# Patient Record
Sex: MALE | Race: WHITE | NOT HISPANIC OR LATINO | Employment: OTHER | URBAN - NONMETROPOLITAN AREA
[De-identification: names, ages, dates, MRNs, and addresses within clinical notes are randomized per-mention and may not be internally consistent; named-entity substitution may affect disease eponyms.]

---

## 2017-09-03 ENCOUNTER — APPOINTMENT (EMERGENCY)
Dept: RADIOLOGY | Facility: HOSPITAL | Age: 67
End: 2017-09-03
Payer: MEDICARE

## 2017-09-03 ENCOUNTER — HOSPITAL ENCOUNTER (EMERGENCY)
Facility: HOSPITAL | Age: 67
Discharge: HOME | End: 2017-09-03
Attending: EMERGENCY MEDICINE | Admitting: EMERGENCY MEDICINE
Payer: MEDICARE

## 2017-09-03 VITALS
SYSTOLIC BLOOD PRESSURE: 143 MMHG | DIASTOLIC BLOOD PRESSURE: 84 MMHG | OXYGEN SATURATION: 96 % | WEIGHT: 195 LBS | TEMPERATURE: 97.4 F | RESPIRATION RATE: 18 BRPM | HEART RATE: 80 BPM

## 2017-09-03 DIAGNOSIS — IMO0002 SKIN LACERATION: Primary | ICD-10-CM

## 2017-09-03 PROCEDURE — 99283 EMERGENCY DEPT VISIT LOW MDM: CPT

## 2017-09-03 PROCEDURE — 73130 X-RAY EXAM OF HAND: CPT

## 2017-09-03 RX ORDER — TRAZODONE HYDROCHLORIDE 150 MG/1
75 TABLET ORAL
COMMUNITY
End: 2018-09-18 | Stop reason: SDUPTHER

## 2017-09-03 RX ORDER — LOSARTAN POTASSIUM 100 MG/1
100 TABLET ORAL DAILY
COMMUNITY
End: 2018-09-18 | Stop reason: SDUPTHER

## 2017-09-03 RX ORDER — LEVOTHYROXINE SODIUM 0.07 MG/1
75 TABLET ORAL DAILY
COMMUNITY
End: 2018-09-18 | Stop reason: SDUPTHER

## 2017-09-03 RX ORDER — SIMVASTATIN 20 MG
20 TABLET ORAL
COMMUNITY
End: 2018-09-18 | Stop reason: SDUPTHER

## 2017-09-03 RX ORDER — ASPIRIN 81 MG/1
81 TABLET, CHEWABLE ORAL DAILY
COMMUNITY
End: 2019-07-01 | Stop reason: ALTCHOICE

## 2017-09-13 ENCOUNTER — OFFICE VISIT (OUTPATIENT)
Dept: URGENT CARE | Facility: CLINIC | Age: 67
End: 2017-09-13
Payer: MEDICARE

## 2017-09-13 PROCEDURE — 99211 OFF/OP EST MAY X REQ PHY/QHP: CPT

## 2018-05-30 ENCOUNTER — OFFICE VISIT (OUTPATIENT)
Dept: URGENT CARE | Facility: CLINIC | Age: 68
End: 2018-05-30
Payer: MEDICARE

## 2018-05-30 VITALS
HEIGHT: 68 IN | OXYGEN SATURATION: 98 % | WEIGHT: 206 LBS | HEART RATE: 96 BPM | SYSTOLIC BLOOD PRESSURE: 122 MMHG | DIASTOLIC BLOOD PRESSURE: 84 MMHG | TEMPERATURE: 97.9 F | RESPIRATION RATE: 16 BRPM | BODY MASS INDEX: 31.22 KG/M2

## 2018-05-30 DIAGNOSIS — R05.9 COUGH: Primary | ICD-10-CM

## 2018-05-30 PROBLEM — E78.5 HYPERLIPIDEMIA: Status: ACTIVE | Noted: 2017-09-13

## 2018-05-30 PROBLEM — I10 HYPERTENSION: Status: ACTIVE | Noted: 2017-09-13

## 2018-05-30 PROBLEM — E03.9 HYPOTHYROIDISM: Status: ACTIVE | Noted: 2017-09-13

## 2018-05-30 PROCEDURE — G0463 HOSPITAL OUTPT CLINIC VISIT: HCPCS

## 2018-05-30 PROCEDURE — 99213 OFFICE O/P EST LOW 20 MIN: CPT

## 2018-05-30 RX ORDER — AZITHROMYCIN 250 MG/1
TABLET, FILM COATED ORAL
Qty: 6 TABLET | Refills: 0 | Status: SHIPPED | OUTPATIENT
Start: 2018-05-30 | End: 2018-06-03

## 2018-05-30 RX ORDER — BENZONATATE 100 MG/1
100 CAPSULE ORAL 3 TIMES DAILY PRN
Qty: 20 CAPSULE | Refills: 0 | Status: SHIPPED | OUTPATIENT
Start: 2018-05-30 | End: 2018-09-18 | Stop reason: ALTCHOICE

## 2018-05-30 RX ORDER — LEVOTHYROXINE SODIUM 0.07 MG/1
75 TABLET ORAL
COMMUNITY
End: 2018-05-30

## 2018-05-30 RX ORDER — TRAZODONE HYDROCHLORIDE 150 MG/1
75 TABLET ORAL DAILY
COMMUNITY
End: 2018-05-30

## 2018-05-30 RX ORDER — SIMVASTATIN 20 MG
20 TABLET ORAL
COMMUNITY
End: 2018-05-30

## 2018-05-30 RX ORDER — LOSARTAN POTASSIUM 100 MG/1
100 TABLET ORAL
COMMUNITY
End: 2018-05-30

## 2018-05-30 NOTE — PROGRESS NOTES
NAME: Elvia Kincaid is a 77 y o  male  : 1950    MRN: [id number]      Assessment and Plan   Cough [R05]  1  Cough  azithromycin (ZITHROMAX) 250 mg tablet    benzonatate (TESSALON PERLES) 100 mg capsule       Toya Campuzano was seen today for cold like symptoms  Diagnoses and all orders for this visit:    Cough  -     azithromycin (ZITHROMAX) 250 mg tablet; Take 2 tablets today and only 1 pill daily until finished  -     benzonatate (TESSALON PERLES) 100 mg capsule; Take 1 capsule (100 mg total) by mouth 3 (three) times a day as needed for cough        Patient Instructions   Patient Instructions   Follow up with pcp  Take meds as directed  Use flonase and zyrtec daily as directed  Watchful waiting discussed, dont use antibiotic unsless symptoms worsen  Take meds as directed   Tessalon perles for the cough      Proceed to ER if symptoms worsen  Chief Complaint     Chief Complaint   Patient presents with    Cold Like Symptoms     Pt states cold like symptoms since   He states runny sinuses, sore throat and a cough, especially when laying flat  He is able to cough up some phlegm  Pt states he did take an OTC med  States some pain in his chest when breathing and labored breathing         History of Present Illness     Patient is here today for sore thorat, cough and increase brouchospasm, rib pain from coughing, increased post nasal drip and has some mucus production yellow green in color, symptoms present for the past days  Recently moved to the area, has no PCP at this time and flew in on  from [facility]  He has no pain to the ears at this time but has constant coughing and post nasal drip present  Taking OTC meds for his symptoms for two days, no relief  Review of Systems   Review of Systems   Constitutional: Negative for fatigue and fever  HENT: Positive for postnasal drip, sneezing and sore throat   Negative for congestion, ear discharge, ear pain, facial swelling, rhinorrhea, sinus pain, sinus pressure, trouble swallowing and voice change  Eyes: Negative  Respiratory: Positive for cough  Cardiovascular: Negative  Gastrointestinal: Negative  Endocrine: Negative  Genitourinary: Negative  Musculoskeletal: Negative  Skin: Negative  Current Medications       Current Outpatient Prescriptions:     aspirin 81 mg chewable tablet, Chew 81 mg daily, Disp: , Rfl:     azithromycin (ZITHROMAX) 250 mg tablet, Take 2 tablets today and only 1 pill daily until finished , Disp: 6 tablet, Rfl: 0    benzonatate (TESSALON PERLES) 100 mg capsule, Take 1 capsule (100 mg total) by mouth 3 (three) times a day as needed for cough, Disp: 20 capsule, Rfl: 0    levothyroxine 75 mcg tablet, Take 75 mcg by mouth daily, Disp: , Rfl:     losartan (COZAAR) 100 MG tablet, Take 100 mg by mouth daily, Disp: , Rfl:     simvastatin (ZOCOR) 20 mg tablet, Take 20 mg by mouth daily at bedtime, Disp: , Rfl:     traZODone (DESYREL) 150 mg tablet, Take 50 mg by mouth daily at bedtime, Disp: , Rfl:     Current Allergies     Allergies as of 05/30/2018    (No Known Allergies)              Past Medical History:   Diagnosis Date    Disease of thyroid gland     Hypertension     Sleep disorder        Past Surgical History:   Procedure Laterality Date    CHOLECYSTECTOMY      JOINT REPLACEMENT         No family history on file  Medications have been verified  The following portions of the patient's history were reviewed and updated as appropriate: allergies, current medications, past family history, past medical history, past social history, past surgical history and problem list     Objective   /84   Pulse 96   Temp 97 9 °F (36 6 °C)   Resp 16   Ht 5' 8" (1 727 m)   Wt 93 4 kg (206 lb)   SpO2 98%   BMI 31 32 kg/m²      Physical Exam     Physical Exam   Constitutional: He appears well-developed and well-nourished  HENT:   Head: Normocephalic     Right Ear: Tympanic membrane normal    Left Ear: Tympanic membrane normal    Nose: No mucosal edema  Mouth/Throat: Uvula is midline, oropharynx is clear and moist and mucous membranes are normal    Eyes: Conjunctivae and EOM are normal    Neck: Trachea normal and normal range of motion  Neck supple  Cardiovascular: Normal pulses  Pulmonary/Chest: Effort normal and breath sounds normal    Abdominal: Soft  Normal appearance  There is no tenderness  There is no CVA tenderness  Skin: Skin is warm and intact         Roslyn Comes, CRNP

## 2018-05-30 NOTE — PATIENT INSTRUCTIONS
Follow up with pcp  Take meds as directed  Use flonase and zyrtec daily as directed  Watchful waiting discussed, dont use antibiotic unsless symptoms worsen  Take meds as directed   Tessalon perles for the cough

## 2018-06-11 LAB — HBA1C MFR BLD HPLC: 6.2 %

## 2018-08-25 ENCOUNTER — OFFICE VISIT (OUTPATIENT)
Dept: URGENT CARE | Facility: CLINIC | Age: 68
End: 2018-08-25
Payer: MEDICARE

## 2018-08-25 VITALS
HEIGHT: 68 IN | DIASTOLIC BLOOD PRESSURE: 82 MMHG | SYSTOLIC BLOOD PRESSURE: 146 MMHG | OXYGEN SATURATION: 98 % | HEART RATE: 97 BPM | RESPIRATION RATE: 16 BRPM | BODY MASS INDEX: 31.07 KG/M2 | WEIGHT: 205 LBS | TEMPERATURE: 97 F

## 2018-08-25 DIAGNOSIS — L23.7 POISON IVY: ICD-10-CM

## 2018-08-25 DIAGNOSIS — R21 RASH: Primary | ICD-10-CM

## 2018-08-25 PROCEDURE — 99213 OFFICE O/P EST LOW 20 MIN: CPT | Performed by: NURSE PRACTITIONER

## 2018-08-25 PROCEDURE — G0463 HOSPITAL OUTPT CLINIC VISIT: HCPCS | Performed by: NURSE PRACTITIONER

## 2018-08-25 RX ORDER — PREDNISONE 10 MG/1
TABLET ORAL
Qty: 21 TABLET | Refills: 0 | Status: SHIPPED | OUTPATIENT
Start: 2018-08-25 | End: 2018-09-07 | Stop reason: ALTCHOICE

## 2018-08-25 NOTE — PROGRESS NOTES
NAME: Astrid Augustine is a 77 y o  male  : 1950    MRN: [id number]      Assessment and Plan   Rash [R21]  1  Rash     2  Poison ivy  predniSONE 10 mg tablet       Neville Both was seen today for rash  Diagnoses and all orders for this visit:    Rash    Poison ivy  -     predniSONE 10 mg tablet; Take 6 tablets today and decrease by one tablet daily until gone        Patient Instructions   Patient Instructions   Take medications as directed  Use antihistamine claritin daily or zyrtec, may use benadryl prior to bed   May use calamine lotion   dont use steroid cream to the face   Take all meds as directed  Follow up with pcp   Go to ER if symptoms worsen  Steroids are to be used as directed, decrease by one tablet daily    Change linens, towels wash cloths    Proceed to ER if symptoms worsen  Chief Complaint     Chief Complaint   Patient presents with    Rash     both arms after weeding the [place] bushes  History of Present Illness     Pt here today with a rash on both upper arms  He was recently in garden weeding grass and developed rash  It is itchy and bothersome in nature  He has tried using RX strength hydrocorisone 2 5% cream to the areas with little improvement  Rash on upper and lower extremities and no where else, itchy and bothersome  Review of Systems   Review of Systems   Skin: Positive for rash           Current Medications       Current Outpatient Prescriptions:     aspirin 81 mg chewable tablet, Chew 81 mg daily, Disp: , Rfl:     levothyroxine 75 mcg tablet, Take 75 mcg by mouth daily, Disp: , Rfl:     losartan (COZAAR) 100 MG tablet, Take 100 mg by mouth daily, Disp: , Rfl:     simvastatin (ZOCOR) 20 mg tablet, Take 20 mg by mouth daily at bedtime, Disp: , Rfl:     traZODone (DESYREL) 150 mg tablet, Take 50 mg by mouth daily at bedtime, Disp: , Rfl:     benzonatate (TESSALON PERLES) 100 mg capsule, Take 1 capsule (100 mg total) by mouth 3 (three) times a day as needed for cough (Patient not taking: Reported on 8/25/2018 ), Disp: 20 capsule, Rfl: 0    predniSONE 10 mg tablet, Take 6 tablets today and decrease by one tablet daily until gone, Disp: 21 tablet, Rfl: 0    Current Allergies     Allergies as of 08/25/2018    (No Known Allergies)              Past Medical History:   Diagnosis Date    Disease of thyroid gland     Hypertension     Sleep disorder        Past Surgical History:   Procedure Laterality Date    CHOLECYSTECTOMY      JOINT REPLACEMENT      KNEE SURGERY      LEG WOUND REPAIR / CLOSURE         No family history on file  Medications have been verified  The following portions of the patient's history were reviewed and updated as appropriate: allergies, current medications, past family history, past medical history, past social history, past surgical history and problem list     Objective   /82   Pulse 97   Temp (!) 97 °F (36 1 °C)   Resp 16   Ht 5' 8" (1 727 m)   Wt 93 kg (205 lb)   SpO2 98%   BMI 31 17 kg/m²      Physical Exam     Physical Exam   Constitutional: He appears well-developed and well-nourished  Skin: Rash (rash noted to both upper arms, ) noted              [place] Levo, CRNP

## 2018-08-25 NOTE — PATIENT INSTRUCTIONS
Take medications as directed  Use antihistamine claritin daily or zyrtec, may use benadryl prior to bed   May use calamine lotion   dont use steroid cream to the face   Take all meds as directed  Follow up with pcp   Go to ER if symptoms worsen  Steroids are to be used as directed, decrease by one tablet daily    Change linens, towels wash cloths

## 2018-09-07 ENCOUNTER — HOSPITAL ENCOUNTER (EMERGENCY)
Facility: HOSPITAL | Age: 68
Discharge: HOME | End: 2018-09-07
Attending: EMERGENCY MEDICINE | Admitting: EMERGENCY MEDICINE
Payer: MEDICARE

## 2018-09-07 VITALS
DIASTOLIC BLOOD PRESSURE: 60 MMHG | RESPIRATION RATE: 20 BRPM | TEMPERATURE: 98 F | SYSTOLIC BLOOD PRESSURE: 144 MMHG | WEIGHT: 205.03 LBS | OXYGEN SATURATION: 99 % | HEART RATE: 75 BPM | BODY MASS INDEX: 31.17 KG/M2

## 2018-09-07 DIAGNOSIS — S61.219A FINGER LACERATION: Primary | ICD-10-CM

## 2018-09-07 PROCEDURE — 99282 EMERGENCY DEPT VISIT SF MDM: CPT

## 2018-09-07 RX ORDER — GINSENG 100 MG
1 CAPSULE ORAL ONCE
Status: COMPLETED | OUTPATIENT
Start: 2018-09-07 | End: 2018-09-07

## 2018-09-07 RX ORDER — LIDOCAINE HYDROCHLORIDE 10 MG/ML
5 INJECTION, SOLUTION EPIDURAL; INFILTRATION; INTRACAUDAL; PERINEURAL ONCE
Status: COMPLETED | OUTPATIENT
Start: 2018-09-07 | End: 2018-09-07

## 2018-09-07 RX ADMIN — BACITRACIN ZINC 1 SMALL APPLICATION: 500 OINTMENT TOPICAL at 09:23

## 2018-09-07 RX ADMIN — LIDOCAINE HYDROCHLORIDE 5 ML: 10 INJECTION, SOLUTION EPIDURAL; INFILTRATION; INTRACAUDAL; PERINEURAL at 09:23

## 2018-09-07 NOTE — ED NOTES
Wound cleansed with NSS  Bacitracin ointment and DSD applied  AL Foam splint applied to index finger       Shukri Cook RN  09/07/18 5734

## 2018-09-07 NOTE — ED PROVIDER NOTES
History  Chief Complaint   Patient presents with    Finger Laceration     To ED with laceration to right index finger from a knife approx 20 minutes ago  This is a 68-year-old male who cut his right 2nd finger with a knife 20 minutes prior to arrival tetanus up-to-date gross sensation and movement to the digits is intact        History provided by:  Patient  Injury   Location:   right 2nd finger  Quality:   laceration  Severity:  Moderate  Onset quality:  Sudden  Timing:  Constant  Progression:  Unchanged  Chronicity:  New  Context:   cut right 2nd finger with a knife while cleaning it      Prior to Admission Medications   Prescriptions Last Dose Informant Patient Reported? Taking?   aspirin 81 mg chewable tablet   Yes No   Sig: Chew 81 mg daily   benzonatate (TESSALON PERLES) 100 mg capsule   No No   Sig: Take 1 capsule (100 mg total) by mouth 3 (three) times a day as needed for cough   Patient not taking: Reported on 8/25/2018    levothyroxine 75 mcg tablet   Yes No   Sig: Take 75 mcg by mouth daily   losartan (COZAAR) 100 MG tablet   Yes No   Sig: Take 100 mg by mouth daily   simvastatin (ZOCOR) 20 mg tablet   Yes No   Sig: Take 20 mg by mouth daily at bedtime   traZODone (DESYREL) 150 mg tablet   Yes No   Sig: Take 50 mg by mouth daily at bedtime      Facility-Administered Medications: None       Past Medical History:   Diagnosis Date    Disease of thyroid gland     Hypertension     Sleep disorder        Past Surgical History:   Procedure Laterality Date    CHOLECYSTECTOMY      JOINT REPLACEMENT      KNEE SURGERY      LEG WOUND REPAIR / CLOSURE         History reviewed  No pertinent family history  I have reviewed and agree with the history as documented      Social History   Substance Use Topics    Smoking status: Former Smoker     Types: Cigarettes, Pipe, Cigars    Smokeless tobacco: Never Used    Alcohol use 0 6 oz/week     1 Glasses of wine per week        Review of Systems   Skin: Positive for wound  All other systems reviewed and are negative  Physical Exam  Physical Exam   Constitutional: He is oriented to person, place, and time  He appears well-developed and well-nourished  No distress  HENT:   Head: Normocephalic and atraumatic  Eyes: EOM are normal  Pupils are equal, round, and reactive to light  No scleral icterus  Neck: Neck supple  No tracheal deviation present  Cardiovascular: Normal rate, regular rhythm and intact distal pulses  Pulmonary/Chest: Effort normal and breath sounds normal  No stridor  No respiratory distress  He has no wheezes  Abdominal: Soft  Bowel sounds are normal  He exhibits no distension  There is no tenderness  Musculoskeletal: Normal range of motion  He exhibits tenderness  He exhibits no edema  Neurological: He is oriented to person, place, and time  No cranial nerve deficit  Skin: Skin is warm and dry  He is not diaphoretic  2 cm laceration to the lateral aspect of the right 2nd finger   Psychiatric: He has a normal mood and affect  His behavior is normal  Thought content normal    Nursing note and vitals reviewed        Vital Signs  ED Triage Vitals [09/07/18 0912]   Temperature Pulse Respirations Blood Pressure SpO2   98 °F (36 7 °C) 75 20 144/60 99 %      Temp Source Heart Rate Source Patient Position - Orthostatic VS BP Location FiO2 (%)   Tympanic Monitor Sitting Right arm --      Pain Score       2           Vitals:    09/07/18 0912   BP: 144/60   Pulse: 75   Patient Position - Orthostatic VS: Sitting       Visual Acuity      ED Medications  Medications   lidocaine (PF) (XYLOCAINE-MPF) 1 % injection 5 mL (5 mL Infiltration Given 9/7/18 0923)   bacitracin topical ointment 1 small application (1 small application Topical Given 9/7/18 6096)       Diagnostic Studies  Results Reviewed     None                 No orders to display              Procedures  Lac Repair  Date/Time: 9/7/2018 9:18 AM  Performed by: Valentín Scruggs  Authorized by: Anand RUCKER   Body area: upper extremity  Location details: right index finger  Laceration length: 2 cm  Tendon involvement: none  Nerve involvement: none  Anesthesia: digital block    Anesthesia:  Local Anesthetic: lidocaine 1% without epinephrine  Anesthetic total: 4 mL      Procedure Details:  Irrigation solution: saline  Skin closure: 5-0 nylon  Number of sutures: 5  Technique: simple  Approximation: close  Approximation difficulty: simple  Dressing: antibiotic ointment  Patient tolerance: Patient tolerated the procedure well with no immediate complications             Phone Contacts  ED Phone Contact    ED Course                               MDM  CritCare Time    Disposition  Final diagnoses:   Finger laceration - right 2nd     Time reflects when diagnosis was documented in both MDM as applicable and the Disposition within this note     Time User Action Codes Description Comment    9/7/2018  9:37 AM Regino Counter Add [W16 400B] Finger laceration     9/7/2018  9:37 AM Regino Counter Modify [U81 755K] Finger laceration right 2nd      ED Disposition     ED Disposition Condition Comment    Discharge  Astrid Augustine discharge to home/self care  Condition at discharge: Stable        Follow-up Information     Follow up With Specialties Details Why Contact Info    Yanni Lanier MD Internal Medicine In 1 week For suture removal [facility]  [address]            Patient's Medications   Discharge Prescriptions    No medications on file     No discharge procedures on file      ED Provider  Electronically Signed by           Alan Mejias DO  09/07/18 6715

## 2018-09-07 NOTE — DISCHARGE INSTRUCTIONS
Care For Your Stitches   WHAT YOU NEED TO KNOW:   Stitches, or sutures, are used to close cuts and wounds on the skin  Stitches need to be removed after your wound has healed  DISCHARGE INSTRUCTIONS:   Return to the emergency department if:   · Your stitches come apart  · Blood soaks through your bandages  · You suddenly cannot move your injured joint  · You have sudden numbness around your wound  · You see red streaks coming from your wound  Contact your healthcare provider if:   · You have a fever and chills  · Your wound is red, warm, swollen, or leaking pus  · There is a bad smell coming from your wound  · You have increased pain in the wound area  · You have questions or concerns about your condition or care  Care for your stitches:  Keep your stitches clean and dry  You may need to cover your stitches with a bandage for 24 to 48 hours, or as directed  Do not bump or hit the suture area, as this could open the wound  Do not trim or shorten the ends of your stitches  If they rub on your clothing, put a gauze bandage between the stitches and your clothes  Clean your wound:  Carefully wash your wound with soap and water  For mouth and lip wounds, rinse your mouth after meals and at bedtime  Ask your healthcare provider what to use to rinse your mouth  If you have a scalp wound, you may gently wash your hair every 2 days with mild shampoo  Do not use hair products, such as hair spray  Help your wound heal:   · Elevate  your wound above the level of your heart as often as you can  This will help decrease swelling and pain  Prop your wound on pillows or blankets to keep it elevated comfortably  · Limit activity  Do not stretch the skin around your wound  This will help prevent bleeding and swelling of the wound area  Follow up with your healthcare provider as directed: You may need to return to have your stitches removed   Write down your questions so you remember to ask them during your visits  © 2017 [address] [employer] Information is for End User's use only and may not be sold, redistributed or otherwise used for commercial purposes  All illustrations and images included in CareNotes® are the copyrighted property of A D A M , Inc  or [employer]  The above information is an  only  It is not intended as medical advice for individual conditions or treatments  Talk to your doctor, nurse or pharmacist before following any medical regimen to see if it is safe and effective for you

## 2018-09-18 ENCOUNTER — OFFICE VISIT (OUTPATIENT)
Dept: FAMILY MEDICINE CLINIC | Facility: HOSPITAL | Age: 68
End: 2018-09-18
Payer: MEDICARE

## 2018-09-18 VITALS
SYSTOLIC BLOOD PRESSURE: 130 MMHG | BODY MASS INDEX: 30.99 KG/M2 | WEIGHT: 204.5 LBS | HEART RATE: 78 BPM | DIASTOLIC BLOOD PRESSURE: 70 MMHG | OXYGEN SATURATION: 95 % | HEIGHT: 68 IN

## 2018-09-18 DIAGNOSIS — Z12.11 SCREENING FOR COLON CANCER: ICD-10-CM

## 2018-09-18 DIAGNOSIS — R73.01 IMPAIRED FASTING GLUCOSE: ICD-10-CM

## 2018-09-18 DIAGNOSIS — E78.00 PURE HYPERCHOLESTEROLEMIA: ICD-10-CM

## 2018-09-18 DIAGNOSIS — E03.9 ACQUIRED HYPOTHYROIDISM: ICD-10-CM

## 2018-09-18 DIAGNOSIS — I10 ESSENTIAL HYPERTENSION: ICD-10-CM

## 2018-09-18 DIAGNOSIS — Z23 NEED FOR INFLUENZA VACCINATION: ICD-10-CM

## 2018-09-18 DIAGNOSIS — S61.210A LACERATION OF RIGHT INDEX FINGER WITHOUT FOREIGN BODY WITHOUT DAMAGE TO NAIL, INITIAL ENCOUNTER: Primary | ICD-10-CM

## 2018-09-18 DIAGNOSIS — Z82.49 FH: AORTIC ANEURYSM: ICD-10-CM

## 2018-09-18 PROBLEM — T81.89XA POSTOPERATIVE DEEP VEIN THROMBOSIS (DVT) (HCC): Status: ACTIVE | Noted: 2018-09-18

## 2018-09-18 PROBLEM — T81.89XA POSTOPERATIVE DEEP VEIN THROMBOSIS (DVT) (HCC): Status: RESOLVED | Noted: 2018-09-18 | Resolved: 2018-09-18

## 2018-09-18 PROBLEM — I82.409 POSTOPERATIVE DEEP VEIN THROMBOSIS (DVT) (HCC): Status: ACTIVE | Noted: 2018-09-18

## 2018-09-18 PROBLEM — I82.409 POSTOPERATIVE DEEP VEIN THROMBOSIS (DVT) (HCC): Status: RESOLVED | Noted: 2018-09-18 | Resolved: 2018-09-18

## 2018-09-18 PROCEDURE — G0008 ADMIN INFLUENZA VIRUS VAC: HCPCS

## 2018-09-18 PROCEDURE — 90662 IIV NO PRSV INCREASED AG IM: CPT

## 2018-09-18 PROCEDURE — 99204 OFFICE O/P NEW MOD 45 MIN: CPT | Performed by: INTERNAL MEDICINE

## 2018-09-18 RX ORDER — SIMVASTATIN 20 MG
20 TABLET ORAL
Qty: 90 TABLET | Refills: 3 | Status: SHIPPED | OUTPATIENT
Start: 2018-09-18 | End: 2019-09-30 | Stop reason: SDUPTHER

## 2018-09-18 RX ORDER — LEVOTHYROXINE SODIUM 0.07 MG/1
75 TABLET ORAL DAILY
Qty: 90 TABLET | Refills: 3 | Status: SHIPPED | OUTPATIENT
Start: 2018-09-18 | End: 2019-07-01

## 2018-09-18 RX ORDER — TRAZODONE HYDROCHLORIDE 150 MG/1
75 TABLET ORAL
Qty: 45 TABLET | Refills: 3 | Status: SHIPPED | OUTPATIENT
Start: 2018-09-18 | End: 2019-09-30 | Stop reason: SDUPTHER

## 2018-09-18 RX ORDER — LOSARTAN POTASSIUM 100 MG/1
100 TABLET ORAL DAILY
Qty: 90 TABLET | Refills: 3 | Status: SHIPPED | OUTPATIENT
Start: 2018-09-18 | End: 2019-09-30 | Stop reason: SDUPTHER

## 2018-09-18 NOTE — PROGRESS NOTES
Assessment/Plan:       Diagnoses and all orders for this visit:    Laceration of right index finger without foreign body without damage to nail, initial encounter    FH: aortic aneurysm  -     US abdominal aorta screening aaa; Future    Essential hypertension  -     losartan (COZAAR) 100 MG tablet; Take 1 tablet (100 mg total) by mouth daily    Acquired hypothyroidism  -     levothyroxine 75 mcg tablet; Take 1 tablet (75 mcg total) by mouth daily  -     simvastatin (ZOCOR) 20 mg tablet; Take 1 tablet (20 mg total) by mouth daily at bedtime  -     traZODone (DESYREL) 150 mg tablet; Take 0 5 tablets (75 mg total) by mouth daily at bedtime    Pure hypercholesterolemia    Impaired fasting glucose  -     Hemoglobin A1C; Future    Screening for colon cancer  -     Ambulatory referral to Gastroenterology; Future          All of the above diagnoses have been assessed  Additional COMMENTS/PLAN: never had AAA US in spite of FH father and brothers  Will see back in Dec and then every 6 months  Glyco before -full BW in June  Subjective:      Patient ID: Vineet Kenman is a 77 y o  male  HPI     Laceration-Has on R hand  The following portions of the patient's history were revised and updated as appropriate: Problem list, allergies, med list, FH, SH, Past medical and surgical histories  Current Outpatient Prescriptions   Medication Sig Dispense Refill    aspirin 81 mg chewable tablet Chew 81 mg daily      levothyroxine 75 mcg tablet Take 1 tablet (75 mcg total) by mouth daily 90 tablet 3    losartan (COZAAR) 100 MG tablet Take 1 tablet (100 mg total) by mouth daily 90 tablet 3    simvastatin (ZOCOR) 20 mg tablet Take 1 tablet (20 mg total) by mouth daily at bedtime 90 tablet 3    traZODone (DESYREL) 150 mg tablet Take 0 5 tablets (75 mg total) by mouth daily at bedtime 45 tablet 3     No current facility-administered medications for this visit  Review of Systems   Constitutional: Negative  Respiratory: Negative  Cardiovascular: Negative  Gastrointestinal: Negative  Genitourinary: Negative  Musculoskeletal:        Is troubled by base of the thumb arthritis  Objective:    /70 (BP Location: Left arm, Patient Position: Sitting, Cuff Size: Standard)   Pulse 78   Ht 5' 8" (1 727 m)   Wt 92 8 kg (204 lb 8 oz)   SpO2 95%   BMI 31 09 kg/m²      Physical Exam   Constitutional: He appears well-developed and well-nourished  Neck: No JVD present  Cardiovascular: Normal rate  Pulmonary/Chest: Effort normal and breath sounds normal    Abdominal: Soft  Bowel sounds are normal    Musculoskeletal: He exhibits no edema  Skin:   4 simple sutures removed from the R index finger  Full strength and ROM   Vitals reviewed  No visits with results within 2 Week(s) from this visit     Latest known visit with results is:   Orders Only on 08/09/2018   Component Date Value Ref Range Status    Hemoglobin A1C 06/11/2018 6 2   Final         Beatrice Verdugo MD

## 2018-10-01 ENCOUNTER — HOSPITAL ENCOUNTER (OUTPATIENT)
Dept: ULTRASOUND IMAGING | Facility: HOSPITAL | Age: 68
Discharge: HOME | End: 2018-10-01
Attending: INTERNAL MEDICINE
Payer: MEDICARE

## 2018-10-01 DIAGNOSIS — Z82.49 FH: AORTIC ANEURYSM: ICD-10-CM

## 2018-10-01 PROCEDURE — 76706 US ABDL AORTA SCREEN AAA: CPT

## 2018-12-19 ENCOUNTER — APPOINTMENT (OUTPATIENT)
Dept: LAB | Facility: HOSPITAL | Age: 68
End: 2018-12-19
Attending: INTERNAL MEDICINE
Payer: MEDICARE

## 2018-12-19 DIAGNOSIS — R73.01 IMPAIRED FASTING GLUCOSE: ICD-10-CM

## 2018-12-19 LAB
EST. AVERAGE GLUCOSE BLD GHB EST-MCNC: 137 MG/DL
HBA1C MFR BLD: 6.4 % (ref 4.2–6.3)

## 2018-12-19 PROCEDURE — 36415 COLL VENOUS BLD VENIPUNCTURE: CPT

## 2018-12-19 PROCEDURE — 83036 HEMOGLOBIN GLYCOSYLATED A1C: CPT

## 2018-12-27 ENCOUNTER — OFFICE VISIT (OUTPATIENT)
Dept: FAMILY MEDICINE CLINIC | Facility: HOSPITAL | Age: 68
End: 2018-12-27
Payer: MEDICARE

## 2018-12-27 VITALS
DIASTOLIC BLOOD PRESSURE: 70 MMHG | SYSTOLIC BLOOD PRESSURE: 120 MMHG | HEART RATE: 79 BPM | BODY MASS INDEX: 30.92 KG/M2 | HEIGHT: 68 IN | WEIGHT: 204 LBS

## 2018-12-27 DIAGNOSIS — Z12.5 ENCOUNTER FOR PROSTATE CANCER SCREENING: ICD-10-CM

## 2018-12-27 DIAGNOSIS — E78.00 PURE HYPERCHOLESTEROLEMIA: ICD-10-CM

## 2018-12-27 DIAGNOSIS — I10 ESSENTIAL HYPERTENSION: ICD-10-CM

## 2018-12-27 DIAGNOSIS — M19.90 ARTHRITIS: ICD-10-CM

## 2018-12-27 DIAGNOSIS — R73.01 IMPAIRED FASTING GLUCOSE: ICD-10-CM

## 2018-12-27 DIAGNOSIS — E03.9 ACQUIRED HYPOTHYROIDISM: Primary | ICD-10-CM

## 2018-12-27 DIAGNOSIS — Z11.59 SCREENING FOR VIRAL DISEASE: ICD-10-CM

## 2018-12-27 PROCEDURE — 99214 OFFICE O/P EST MOD 30 MIN: CPT | Performed by: INTERNAL MEDICINE

## 2018-12-27 NOTE — PROGRESS NOTES
Assessment/Plan:       Diagnoses and all orders for this visit:    Acquired hypothyroidism  -     TSH, 3rd generation; Future    Essential hypertension  -     CBC; Future  -     Comprehensive metabolic panel; Future    Pure hypercholesterolemia  -     Lipid Panel with Direct LDL reflex; Future    Impaired fasting glucose  -     Hemoglobin A1C; Future    Screening for viral disease  -     Hepatitis C antibody; Future    Encounter for prostate cancer screening  -     PSA, Total Screen; Future    Arthritis  -     Cyclic citrul peptide antibody, IgG; Future  -     Sedimentation rate, automated; Future          All of the above diagnoses have been assessed  Additional COMMENTS/PLAN:  wellness next OV    Will check for RA next OV      Subjective:      Patient ID: Julio Ni is a 77 y o  male  HPI     Hypertension  Patient is here for follow-up of elevated blood pressure  He is  adherent to a low-salt diet  Patient denies headache, dizziness or lightheadedness Cardiovascular risk factors: dyslipidemia  History of target organ damage: none  IFG-has borderline glyco  Will be more active in [place]  Hypothyroid - Patient denies any symptoms such as heat or cold intolerance, change in hair texture, or change in bowel habits  There has been no significant change in weight  The following portions of the patient's history were revised and updated as appropriate: Problem list, allergies, med list, FH, SH, Past medical and surgical histories      Current Outpatient Prescriptions   Medication Sig Dispense Refill    aspirin 81 mg chewable tablet Chew 81 mg daily      levothyroxine 75 mcg tablet Take 1 tablet (75 mcg total) by mouth daily 90 tablet 3    losartan (COZAAR) 100 MG tablet Take 1 tablet (100 mg total) by mouth daily 90 tablet 3    simvastatin (ZOCOR) 20 mg tablet Take 1 tablet (20 mg total) by mouth daily at bedtime 90 tablet 3    traZODone (DESYREL) 150 mg tablet Take 0 5 tablets (75 mg total) by mouth daily at bedtime 45 tablet 3     No current facility-administered medications for this visit  Review of Systems   Musculoskeletal: Positive for arthralgias  All other systems reviewed and are negative  Objective:    /70 (BP Location: Left arm, Patient Position: Sitting, Cuff Size: Large)   Pulse 79   Ht 5' 8" (1 727 m)   Wt 92 5 kg (204 lb)   BMI 31 02 kg/m²      Physical Exam   Constitutional: He appears well-developed and well-nourished  HENT:   Head: Normocephalic and atraumatic  Neck: No JVD present  No thyromegaly present  Cardiovascular: Normal rate, regular rhythm and normal heart sounds  Pulmonary/Chest: Effort normal and breath sounds normal    Abdominal: Soft  Bowel sounds are normal  There is no tenderness  Musculoskeletal: Normal range of motion  He exhibits no edema  Skin: Skin is warm and dry  Vitals reviewed          Appointment on 12/19/2018   Component Date Value Ref Range Status    Hemoglobin A1C 12/19/2018 6 4* 4 2 - 6 3 % Final    EAG 12/19/2018 137  mg/dl Final         Dorita Clay MD

## 2018-12-28 ENCOUNTER — TELEPHONE (OUTPATIENT)
Dept: FAMILY MEDICINE CLINIC | Facility: HOSPITAL | Age: 68
End: 2018-12-28

## 2018-12-28 DIAGNOSIS — J40 BRONCHITIS: Primary | ICD-10-CM

## 2018-12-28 RX ORDER — CEFUROXIME AXETIL 500 MG/1
500 TABLET ORAL EVERY 12 HOURS SCHEDULED
Qty: 14 TABLET | Refills: 0 | Status: SHIPPED | OUTPATIENT
Start: 2018-12-28 | End: 2019-01-04

## 2018-12-28 RX ORDER — PROMETHAZINE HYDROCHLORIDE AND CODEINE PHOSPHATE 6.25; 1 MG/5ML; MG/5ML
5 SYRUP ORAL EVERY 4 HOURS PRN
Qty: 150 ML | Refills: 0 | Status: SHIPPED | OUTPATIENT
Start: 2018-12-28 | End: 2019-07-01 | Stop reason: ALTCHOICE

## 2019-06-25 ENCOUNTER — APPOINTMENT (OUTPATIENT)
Dept: LAB | Facility: HOSPITAL | Age: 69
End: 2019-06-25
Attending: INTERNAL MEDICINE
Payer: MEDICARE

## 2019-06-25 DIAGNOSIS — I10 ESSENTIAL HYPERTENSION: ICD-10-CM

## 2019-06-25 DIAGNOSIS — Z12.5 ENCOUNTER FOR PROSTATE CANCER SCREENING: ICD-10-CM

## 2019-06-25 DIAGNOSIS — R73.01 IMPAIRED FASTING GLUCOSE: ICD-10-CM

## 2019-06-25 DIAGNOSIS — Z11.59 SCREENING FOR VIRAL DISEASE: ICD-10-CM

## 2019-06-25 DIAGNOSIS — E78.00 PURE HYPERCHOLESTEROLEMIA: ICD-10-CM

## 2019-06-25 DIAGNOSIS — E03.9 ACQUIRED HYPOTHYROIDISM: ICD-10-CM

## 2019-06-25 LAB
ALBUMIN SERPL BCP-MCNC: 3.9 G/DL (ref 3.5–5)
ALP SERPL-CCNC: 100 U/L (ref 46–116)
ALT SERPL W P-5'-P-CCNC: 23 U/L (ref 12–78)
ANION GAP SERPL CALCULATED.3IONS-SCNC: 8 MMOL/L (ref 4–13)
AST SERPL W P-5'-P-CCNC: 18 U/L (ref 5–45)
BILIRUB SERPL-MCNC: 0.8 MG/DL (ref 0.2–1)
BUN SERPL-MCNC: 19 MG/DL (ref 5–25)
CALCIUM SERPL-MCNC: 9.1 MG/DL (ref 8.3–10.1)
CHLORIDE SERPL-SCNC: 106 MMOL/L (ref 100–108)
CHOLEST SERPL-MCNC: 176 MG/DL (ref 50–200)
CO2 SERPL-SCNC: 26 MMOL/L (ref 21–32)
CREAT SERPL-MCNC: 0.95 MG/DL (ref 0.6–1.3)
ERYTHROCYTE [DISTWIDTH] IN BLOOD BY AUTOMATED COUNT: 12.7 % (ref 11.6–15.1)
EST. AVERAGE GLUCOSE BLD GHB EST-MCNC: 114 MG/DL
GFR SERPL CREATININE-BSD FRML MDRD: 80 ML/MIN/1.73SQ M
GLUCOSE P FAST SERPL-MCNC: 125 MG/DL (ref 65–99)
HBA1C MFR BLD: 5.6 % (ref 4.2–6.3)
HCT VFR BLD AUTO: 41.4 % (ref 36.5–49.3)
HCV AB SER QL: NORMAL
HDLC SERPL-MCNC: 56 MG/DL (ref 40–60)
HGB BLD-MCNC: 13.8 G/DL (ref 12–17)
LDLC SERPL CALC-MCNC: 100 MG/DL (ref 0–100)
MCH RBC QN AUTO: 31.5 PG (ref 26.8–34.3)
MCHC RBC AUTO-ENTMCNC: 33.3 G/DL (ref 31.4–37.4)
MCV RBC AUTO: 95 FL (ref 82–98)
PLATELET # BLD AUTO: 237 THOUSANDS/UL (ref 149–390)
PMV BLD AUTO: 9.5 FL (ref 8.9–12.7)
POTASSIUM SERPL-SCNC: 4.2 MMOL/L (ref 3.5–5.3)
PROT SERPL-MCNC: 7.4 G/DL (ref 6.4–8.2)
PSA SERPL-MCNC: 2.5 NG/ML (ref 0–4)
RBC # BLD AUTO: 4.38 MILLION/UL (ref 3.88–5.62)
SODIUM SERPL-SCNC: 140 MMOL/L (ref 136–145)
TRIGL SERPL-MCNC: 101 MG/DL
TSH SERPL DL<=0.05 MIU/L-ACNC: 3.79 UIU/ML (ref 0.36–3.74)
WBC # BLD AUTO: 5.53 THOUSAND/UL (ref 4.31–10.16)

## 2019-06-25 PROCEDURE — 36415 COLL VENOUS BLD VENIPUNCTURE: CPT

## 2019-06-25 PROCEDURE — 83036 HEMOGLOBIN GLYCOSYLATED A1C: CPT

## 2019-06-25 PROCEDURE — 80061 LIPID PANEL: CPT

## 2019-06-25 PROCEDURE — 86803 HEPATITIS C AB TEST: CPT

## 2019-06-25 PROCEDURE — 80053 COMPREHEN METABOLIC PANEL: CPT

## 2019-06-25 PROCEDURE — 85027 COMPLETE CBC AUTOMATED: CPT

## 2019-06-25 PROCEDURE — G0103 PSA SCREENING: HCPCS

## 2019-06-25 PROCEDURE — 84443 ASSAY THYROID STIM HORMONE: CPT

## 2019-07-01 ENCOUNTER — APPOINTMENT (OUTPATIENT)
Dept: LAB | Facility: HOSPITAL | Age: 69
End: 2019-07-01
Attending: INTERNAL MEDICINE
Payer: MEDICARE

## 2019-07-01 ENCOUNTER — OFFICE VISIT (OUTPATIENT)
Dept: FAMILY MEDICINE CLINIC | Facility: HOSPITAL | Age: 69
End: 2019-07-01
Payer: MEDICARE

## 2019-07-01 VITALS
HEIGHT: 67 IN | SYSTOLIC BLOOD PRESSURE: 110 MMHG | DIASTOLIC BLOOD PRESSURE: 78 MMHG | BODY MASS INDEX: 28.72 KG/M2 | WEIGHT: 183 LBS | HEART RATE: 90 BPM

## 2019-07-01 DIAGNOSIS — H43.391 VITREOUS FLOATERS OF RIGHT EYE: ICD-10-CM

## 2019-07-01 DIAGNOSIS — I10 ESSENTIAL HYPERTENSION: ICD-10-CM

## 2019-07-01 DIAGNOSIS — R73.01 IFG (IMPAIRED FASTING GLUCOSE): ICD-10-CM

## 2019-07-01 DIAGNOSIS — E78.00 PURE HYPERCHOLESTEROLEMIA: ICD-10-CM

## 2019-07-01 DIAGNOSIS — E03.9 ACQUIRED HYPOTHYROIDISM: Primary | ICD-10-CM

## 2019-07-01 DIAGNOSIS — E03.9 ACQUIRED HYPOTHYROIDISM: ICD-10-CM

## 2019-07-01 DIAGNOSIS — M19.90 ARTHRITIS: ICD-10-CM

## 2019-07-01 LAB
EST. AVERAGE GLUCOSE BLD GHB EST-MCNC: 114 MG/DL
HBA1C MFR BLD: 5.6 % (ref 4.2–6.3)
TSH SERPL DL<=0.05 MIU/L-ACNC: 3.27 UIU/ML (ref 0.36–3.74)

## 2019-07-01 PROCEDURE — 99214 OFFICE O/P EST MOD 30 MIN: CPT | Performed by: INTERNAL MEDICINE

## 2019-07-01 PROCEDURE — 36415 COLL VENOUS BLD VENIPUNCTURE: CPT

## 2019-07-01 PROCEDURE — 83036 HEMOGLOBIN GLYCOSYLATED A1C: CPT

## 2019-07-01 PROCEDURE — 84443 ASSAY THYROID STIM HORMONE: CPT

## 2019-07-01 PROCEDURE — G0438 PPPS, INITIAL VISIT: HCPCS | Performed by: INTERNAL MEDICINE

## 2019-07-01 RX ORDER — LEVOTHYROXINE SODIUM 0.1 MG/1
100 TABLET ORAL DAILY
Qty: 90 TABLET | Refills: 3 | Status: SHIPPED | OUTPATIENT
Start: 2019-07-01 | End: 2019-09-30 | Stop reason: SDUPTHER

## 2019-07-01 NOTE — PROGRESS NOTES
Assessment and Plan:     Problem List Items Addressed This Visit     None         History of Present Illness:     Patient presents for Medicare Annual Wellness visit    Patient Care Team:  Mahad Reyez MD as PCP - General (Internal Medicine)     Problem List:     Patient Active Problem List   Diagnosis    Pure hypercholesterolemia    Essential hypertension    Acquired hypothyroidism    S/p total knee replacement, bilateral    Impaired fasting glucose    Arthritis      Past Medical and Surgical History:     Past Medical History:   Diagnosis Date    Disease of thyroid gland     Hypertension     Sleep disorder      Past Surgical History:   Procedure Laterality Date    CHOLECYSTECTOMY      JOINT REPLACEMENT      KNEE SURGERY      LEG WOUND REPAIR / CLOSURE        Family History:     Family History   Problem Relation Age of Onset    Alcohol abuse Brother     Mental illness Neg Hx       Social History:     Social History     Tobacco Use   Smoking Status Former Smoker    Types: Cigarettes, Pipe, Cigars   Smokeless Tobacco Never Used     Social History     Substance and Sexual Activity   Alcohol Use Yes    Alcohol/week: 1 0 standard drinks    Types: 1 Glasses of wine per week     Social History     Substance and Sexual Activity   Drug Use No      Medications and Allergies:     Current Outpatient Medications   Medication Sig Dispense Refill    aspirin 81 mg chewable tablet Chew 81 mg daily      levothyroxine 75 mcg tablet Take 1 tablet (75 mcg total) by mouth daily 90 tablet 3    losartan (COZAAR) 100 MG tablet Take 1 tablet (100 mg total) by mouth daily 90 tablet 3    promethazine-codeine (PHENERGAN WITH CODEINE) 6 25-10 mg/5 mL syrup Take 5 mL by mouth every 4 (four) hours as needed for cough 150 mL 0    simvastatin (ZOCOR) 20 mg tablet Take 1 tablet (20 mg total) by mouth daily at bedtime 90 tablet 3    traZODone (DESYREL) 150 mg tablet Take 0 5 tablets (75 mg total) by mouth daily at bedtime 45 tablet 3     No current facility-administered medications for this visit  No Known Allergies   Immunizations:     Immunization History   Administered Date(s) Administered    INFLUENZA 10/13/2016, 09/18/2017    Influenza, high dose seasonal 0 5 mL 09/18/2018    Pneumococcal Conjugate 13-Valent 06/19/2015    Pneumococcal Polysaccharide PPV23 10/13/2016    Tdap 04/27/2015      Medicare Screening Tests and Risk Assessments:     Tori Farfan is here for his Subsequent Wellness visit  Health Risk Assessment:  Patient rates overall health as very good  Patient feels that their physical health rating is Same  Eyesight was rated as Slightly worse  Hearing was rated as Same  Patient feels that their emotional and mental health rating is Same  Pain experienced by patient in the last 7 days has been A lot  Patient's pain rating has been 7/10  Patient states that he has experienced no weight loss or gain in last 6 months  (Additional comments: Hand and shoulder pain)    Emotional/Mental Health:  Patient has not been feeling nervous/anxious  PHQ-9 Depression Screening:    Frequency of the following problems over the past two weeks:      1  Little interest or pleasure in doing things: 0 - not at all      2  Feeling down, depressed, or hopeless: 0 - not at all  PHQ-2 Score: 0          Broken Bones/Falls: Fall Risk Assessment:    In the past year, patient has experienced: No history of falling in past year          Bladder/Bowel:  Patient has leaked urine accidently in the last six months  Patient reports no loss of bowel control  Immunizations:  Patient has had a flu vaccination within the last year  Patient has received a pneumonia shot  Patient has not received a shingles shot  Patient has received tetanus/diphtheria shot  Date of tetanus/diphtheria shot: 9/1/2017    Home Safety:  Patient does not have trouble with stairs inside or outside of their home     Patient currently reports that there are no safety hazards present in home, working smoke alarms, working carbon monoxide detectors  Preventative Screenings:   prostate cancer screen performed, 6/25/2019  colon cancer screen completed, 10/9/2018  cholesterol screen completed, 6/25/2019  glaucoma eye exam completed,     Nutrition:  Current diet: Regular, Low Carb and Limited junk food with servings of the following:    Medications:  Patient is not currently taking any over-the-counter supplements  Patient is able to manage medications  Lifestyle Choices:  Patient reports no tobacco use  Patient has smoked or used tobacco in the past   Patient has stopped his tobacco use  Tobacco use quit date: quit in 1983  Patient reports alcohol use  Alcohol use per week: 1-2 glasses of wine per day  Patient drives a vehicle  Patient wears seat belt  Current level of exercise of physical activity described by patient as: walk 2 miles per day and 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  Activities of Daily Living:  Can get out of bed by his or her self, able to dress self, able to make own meals, able to do own shopping, able to bathe self, can do own laundry/housekeeping, can manage own money, pay bills and track expenses    Previous Hospitalizations:  No hospitalization or ED visit in past 12 months        Advanced Directives:  Patient has decided on a power of   Patient has spoken to designated power of   Patient has completed advanced directive          Preventative Screening/Counseling:      Cardiovascular:      General: Screening Current          Diabetes:      General: Screening Current          Colorectal Cancer:      General: Screening Current          Prostate Cancer:      General: Screening Current          Osteoporosis:      General: Screening Not Indicated          Glaucoma:      General: Screening Current          HIV:      General: Screening Not Indicated          Hepatitis C:      General: Screening Current        Advanced Directives:   Patient has living will for healthcare, has durable POA for healthcare, patient has an advanced directive  Immunizations:      Shingrix: Risks & Benefits Discussed            BMI Counseling: There is no height or weight on file to calculate BMI  Discussed the patient's BMI with him  The BMI is above average  BMI counseling and education was provided to the patient  Nutrition recommendations include reducing portion sizes

## 2019-07-01 NOTE — PROGRESS NOTES
Assessment/Plan:       Diagnoses and all orders for this visit:    Acquired hypothyroidism  -     levothyroxine 100 mcg tablet; Take 1 tablet (100 mcg total) by mouth daily  -     TSH, 3rd generation; Future    Pure hypercholesterolemia    Essential hypertension    Vitreous floaters of right eye  -     Ambulatory Referral to Ophthalmology; Future    Arthritis  -     Ambulatory referral to Orthopedic Surgery; Future    IFG (impaired fasting glucose)  -     Hemoglobin A1C; Future          All of the above diagnoses have been assessed  Additional COMMENTS/PLAN: needs RA labs    Will be seen back in 4 months with blood before  Subjective:      Patient ID: Aneesh Montero is a 68 y o  male  HPI     IFG-this  Has resolved with 21 lb weight loss  Hypothyroid - Patient denies any symptoms such as heat or cold intolerance, change in hair texture, or change in bowel habits  There has been no significant change in weight  Hands and shoulders arthritis-uses ibuprofen    The following portions of the patient's history were revised and updated as appropriate: Problem list, allergies, med list, FH, SH, Past medical and surgical histories  Current Outpatient Medications   Medication Sig Dispense Refill    levothyroxine 100 mcg tablet Take 1 tablet (100 mcg total) by mouth daily 90 tablet 3    losartan (COZAAR) 100 MG tablet Take 1 tablet (100 mg total) by mouth daily 90 tablet 3    simvastatin (ZOCOR) 20 mg tablet Take 1 tablet (20 mg total) by mouth daily at bedtime 90 tablet 3    traZODone (DESYREL) 150 mg tablet Take 0 5 tablets (75 mg total) by mouth daily at bedtime 45 tablet 3     No current facility-administered medications for this visit  Review of Systems   Musculoskeletal: Positive for arthralgias (pain in hands and shoulders)  All other systems reviewed and are negative          Objective:    /78 (BP Location: Left arm, Patient Position: Sitting, Cuff Size: Large)   Pulse 90   Ht 5' 7" (1 702 m)   Wt 83 kg (183 lb)   BMI 28 66 kg/m²     BP Readings from Last 3 Encounters:   07/01/19 110/78   12/27/18 120/70   09/18/18 130/70                  Wt Readings from Last 3 Encounters:   07/01/19 83 kg (183 lb)   12/27/18 92 5 kg (204 lb)   09/18/18 92 8 kg (204 lb 8 oz)         Physical Exam   Constitutional: He appears well-developed and well-nourished  HENT:   Head: Normocephalic and atraumatic  Neck: No JVD present  No thyromegaly present  Cardiovascular: Normal rate, regular rhythm and normal heart sounds  Pulmonary/Chest: Effort normal and breath sounds normal    Abdominal: Soft  Bowel sounds are normal  There is no tenderness  Musculoskeletal: Normal range of motion  He exhibits no edema  DJD changes on hands  Skin: Skin is warm and dry  Vitals reviewed  Appointment on 06/25/2019   Component Date Value Ref Range Status    WBC 06/25/2019 5 53  4 31 - 10 16 Thousand/uL Final    RBC 06/25/2019 4 38  3 88 - 5 62 Million/uL Final    Hemoglobin 06/25/2019 13 8  12 0 - 17 0 g/dL Final    Hematocrit 06/25/2019 41 4  36 5 - 49 3 % Final    MCV 06/25/2019 95  82 - 98 fL Final    MCH 06/25/2019 31 5  26 8 - 34 3 pg Final    MCHC 06/25/2019 33 3  31 4 - 37 4 g/dL Final    RDW 06/25/2019 12 7  11 6 - 15 1 % Final    Platelets 10/73/6171 237  149 - 390 Thousands/uL Final    MPV 06/25/2019 9 5  8 9 - 12 7 fL Final    Sodium 06/25/2019 140  136 - 145 mmol/L Final    Potassium 06/25/2019 4 2  3 5 - 5 3 mmol/L Final    Chloride 06/25/2019 106  100 - 108 mmol/L Final    CO2 06/25/2019 26  21 - 32 mmol/L Final    ANION GAP 06/25/2019 8  4 - 13 mmol/L Final    BUN 06/25/2019 19  5 - 25 mg/dL Final    Creatinine 06/25/2019 0 95  0 60 - 1 30 mg/dL Final    Standardized to IDMS reference method    Glucose, Fasting 06/25/2019 125* 65 - 99 mg/dL Final      Specimen collection should occur prior to Sulfasalazine administration due to the potential for falsely depressed results  Specimen collection should occur prior to Sulfapyridine administration due to the potential for falsely elevated results   Calcium 06/25/2019 9 1  8 3 - 10 1 mg/dL Final    AST 06/25/2019 18  5 - 45 U/L Final      Specimen collection should occur prior to Sulfasalazine administration due to the potential for falsely depressed results   ALT 06/25/2019 23  12 - 78 U/L Final      Specimen collection should occur prior to Sulfasalazine administration due to the potential for falsely depressed results   Alkaline Phosphatase 06/25/2019 100  46 - 116 U/L Final    Total Protein 06/25/2019 7 4  6 4 - 8 2 g/dL Final    Albumin 06/25/2019 3 9  3 5 - 5 0 g/dL Final    Total Bilirubin 06/25/2019 0 80  0 20 - 1 00 mg/dL Final    eGFR 06/25/2019 80  ml/min/1 73sq m Final    Hemoglobin A1C 06/25/2019 5 6  4 2 - 6 3 % Final    EAG 06/25/2019 114  mg/dl Final    Cholesterol 06/25/2019 176  50 - 200 mg/dL Final      Cholesterol:       Desirable         <200 mg/dl       Borderline         200-239 mg/dl       High              >239           Triglycerides 06/25/2019 101  <=150 mg/dL Final      Triglyceride:     Normal          <150 mg/dl     Borderline High 150-199 mg/dl     High            200-499 mg/dl        Very High       >499 mg/dl    Specimen collection should occur prior to N-Acetylcysteine or Metamizole administration due to the potential for falsely depressed results   HDL, Direct 06/25/2019 56  40 - 60 mg/dL Final      HDL Cholesterol:       High    >60 mg/dL       Low     <41 mg/dL  Specimen collection should occur prior to Metamizole administration due to the potential for falsley depressed results      LDL Calculated 06/25/2019 100  0 - 100 mg/dL Final      LDL Cholesterol:     Optimal           <100 mg/dl     Near Optimal      100-129 mg/dl     Above Optimal       Borderline High 130-159 mg/dl       High            160-189 mg/dl       Very High       >189 mg/dl         This screening LDL is a calculated result  It does not have the accuracy of the Direct Measured LDL in the monitoring of patients with hyperlipidemia and/or statin therapy  Direct Measure LDL (PSW016) must be ordered separately in these patients   PSA 06/25/2019 2 5  0 0 - 4 0 ng/mL Final      American Urological Association Guidelines define biochemical recurrence of prostate cancer as a detectable or rising PSA value post-radical prostatectomy that is greater than or equal to 0 2 ng/mL with a second confirmatory level of greater than or equal to 0 2 ng/mL   [facility] 3 792* 0 358 - 3 740 uIU/mL Final      Using supplements with high doses of biotin 20 to more than 300 times greater than the adequate daily intake for adults of 30 mcg/day as established by the [place] [employer], can cause falsely depress results      Hepatitis C Ab 06/25/2019 Non-reactive  Non-reactive Final         Génesis Howard MD

## 2019-07-02 ENCOUNTER — TELEPHONE (OUTPATIENT)
Dept: FAMILY MEDICINE CLINIC | Facility: HOSPITAL | Age: 69
End: 2019-07-02

## 2019-07-02 NOTE — TELEPHONE ENCOUNTER
Patient was to have labs drawn in December 2018 to rule out rheumatoid arthritis  Those labs were never drawn  Patient went to lab on 7/1/19 to have these drawn but the lab ben the A1C and TSH instead that were supposed to be done 4 months from now  Patient will call me in October when he is due for labs again

## 2019-07-08 ENCOUNTER — APPOINTMENT (OUTPATIENT)
Dept: RADIOLOGY | Facility: CLINIC | Age: 69
End: 2019-07-08
Payer: MEDICARE

## 2019-07-08 ENCOUNTER — CONSULT (OUTPATIENT)
Dept: OBGYN CLINIC | Facility: CLINIC | Age: 69
End: 2019-07-08
Payer: MEDICARE

## 2019-07-08 VITALS
WEIGHT: 187 LBS | DIASTOLIC BLOOD PRESSURE: 74 MMHG | BODY MASS INDEX: 28.34 KG/M2 | HEIGHT: 68 IN | SYSTOLIC BLOOD PRESSURE: 122 MMHG

## 2019-07-08 DIAGNOSIS — R52 PAIN: ICD-10-CM

## 2019-07-08 DIAGNOSIS — M18.11 ARTHRITIS OF CARPOMETACARPAL (CMC) JOINT OF RIGHT THUMB: ICD-10-CM

## 2019-07-08 DIAGNOSIS — M18.12 ARTHRITIS OF CARPOMETACARPAL (CMC) JOINT OF LEFT THUMB: Primary | ICD-10-CM

## 2019-07-08 PROCEDURE — 73140 X-RAY EXAM OF FINGER(S): CPT

## 2019-07-08 PROCEDURE — 99203 OFFICE O/P NEW LOW 30 MIN: CPT | Performed by: ORTHOPAEDIC SURGERY

## 2019-07-08 PROCEDURE — 20600 DRAIN/INJ JOINT/BURSA W/O US: CPT | Performed by: ORTHOPAEDIC SURGERY

## 2019-07-08 RX ORDER — BETAMETHASONE SODIUM PHOSPHATE AND BETAMETHASONE ACETATE 3; 3 MG/ML; MG/ML
3 INJECTION, SUSPENSION INTRA-ARTICULAR; INTRALESIONAL; INTRAMUSCULAR; SOFT TISSUE
Status: COMPLETED | OUTPATIENT
Start: 2019-07-08 | End: 2019-07-08

## 2019-07-08 RX ORDER — LIDOCAINE HYDROCHLORIDE 10 MG/ML
1 INJECTION, SOLUTION INFILTRATION; PERINEURAL
Status: COMPLETED | OUTPATIENT
Start: 2019-07-08 | End: 2019-07-08

## 2019-07-08 RX ADMIN — BETAMETHASONE SODIUM PHOSPHATE AND BETAMETHASONE ACETATE 3 MG: 3; 3 INJECTION, SUSPENSION INTRA-ARTICULAR; INTRALESIONAL; INTRAMUSCULAR; SOFT TISSUE at 11:10

## 2019-07-08 RX ADMIN — LIDOCAINE HYDROCHLORIDE 1 ML: 10 INJECTION, SOLUTION INFILTRATION; PERINEURAL at 11:10

## 2019-07-08 NOTE — PROGRESS NOTES
ASSESSMENT/PLAN:    Assessment:   Thumb CMC Arthritis  bilateral    Plan:   The patient is going to continue with NSAIDs prn as well as pain relief creams  He will also proceed with steroid injections into his bilateral thumb cmc joints today  He was also given bilateral comfort cool braces to use for activities  Follow Up:  3  month(s)    To Do Next Visit:       General Discussions:     CMC Arthritis: The anatomy and physiology of carpometacarpal joint arthritis was discussed with the patient today in the office  Deterioration of the articular cartilage eventually leads to hypermobility at the thumb Aia 16 joint, resulting in joint subluxation, osteophyte formation, cystic changes within the trapezium and base of the first metacarpal, as well as subchondral sclerosis  Eventually, pain, limited mobility, and compensatory hyperextension at the metacarpophalangeal joint may develop  While normal activity and usage of the thumb joint may provide a painful experience to the patient, this typically does not result in damage to the thumb or hand  Treatment options include resting thumb spica splints to decreased joint edema, pain, and inflammation  Therapy exercises to strengthen the thenar musculature may relieve pain, but do not alter the overall continued development of osteoarthritis  Oral medications, topical medications, corticosteroid injections may decrease pain and increase overall function  Eventually, approximately 5% of patients may require surgical intervention  Operative Discussions:         _____________________________________________________  CHIEF COMPLAINT:  Chief Complaint   Patient presents with    Right Thumb - Pain, Numbness    Left Thumb - Pain, Numbness         SUBJECTIVE:  Leandro Christiansen is a 68 y o  male who presents with Pain  Moderate  Intermittant  Sharp to the bilateral thumb  This started  5 year(s) ago as Chronic    Bump them waking up froms leep thumb lrom, picking up small items, stabbing pain, previous ctr   Radiation: Yes to the  wrist and thumb  Previous Treatments: NSAIDs and pain relief creams with only partial relief  Associated symptoms: No Complaints    PAST MEDICAL HISTORY:  Past Medical History:   Diagnosis Date    Disease of thyroid gland     Hypertension     Sleep disorder        PAST SURGICAL HISTORY:  Past Surgical History:   Procedure Laterality Date    CHOLECYSTECTOMY      JOINT REPLACEMENT      KNEE SURGERY      LEG WOUND REPAIR / CLOSURE         FAMILY HISTORY:  Family History   Problem Relation Age of Onset    Alcohol abuse Brother     Mental illness Neg Hx        SOCIAL HISTORY:  Social History     Tobacco Use    Smoking status: Former Smoker     Types: Cigarettes, Pipe, Cigars    Smokeless tobacco: Never Used    Tobacco comment: quit in 1983   Substance Use Topics    Alcohol use: Yes     Alcohol/week: 1 0 standard drinks     Types: 1 Glasses of wine per week     Frequency: 4 or more times a week     Drinks per session: 1 or 2     Binge frequency: Never    Drug use: No       MEDICATIONS:    Current Outpatient Medications:     levothyroxine 100 mcg tablet, Take 1 tablet (100 mcg total) by mouth daily, Disp: 90 tablet, Rfl: 3    losartan (COZAAR) 100 MG tablet, Take 1 tablet (100 mg total) by mouth daily, Disp: 90 tablet, Rfl: 3    simvastatin (ZOCOR) 20 mg tablet, Take 1 tablet (20 mg total) by mouth daily at bedtime, Disp: 90 tablet, Rfl: 3    traZODone (DESYREL) 150 mg tablet, Take 0 5 tablets (75 mg total) by mouth daily at bedtime, Disp: 45 tablet, Rfl: 3    ALLERGIES:  No Known Allergies    REVIEW OF SYSTEMS:  Pertinent items are noted in HPI      LABS:  HgA1c:   Lab Results   Component Value Date    HGBA1C 5 6 07/01/2019     BMP:   Lab Results   Component Value Date    CALCIUM 9 1 06/25/2019    K 4 2 06/25/2019    CO2 26 06/25/2019     06/25/2019    BUN 19 06/25/2019    CREATININE 0 95 06/25/2019 _____________________________________________________  PHYSICAL EXAMINATION:  Vital signs: /74   Ht 5' 8" (1 727 m)   Wt 84 8 kg (187 lb)   BMI 28 43 kg/m²   General: well developed and well nourished, alert, oriented times 3 and appears comfortable  Psychiatric: Normal  HEENT: Trachea Midline, No torticollis  Cardiovascular: No discernable arrhythmia  Pulmonary: No wheezing or stridor  Skin: No masses, erthema, lacerations, fluctation, ulcerations  Neurovascular: Sensation Intact to the Median, Ulnar, Radial Nerve, Motor Intact to the Median, Ulnar, Radial Nerve and Pulses Intact    MUSCULOSKELETAL EXAMINATION:  LEFT SIDE:  CMC: Positive grind, Positive tendnerness CMC and Positive Shoulder Sign  RIGHT SIDE:  CMC: Positive grind, Positive tendnerness CMC and Positive Shoulder Sign    _____________________________________________________  STUDIES REVIEWED:  Images were reviewd in PACS: xray of bilateral thumbs on 7/8/19 demonstrates stage 4 thumb cmc arthritis         PROCEDURES PERFORMED:  Small joint arthrocentesis: bilateral thumb CMC  Date/Time: 7/8/2019 11:10 AM  Consent given by: patient  Site marked: site marked  Supporting Documentation  Indications: pain   Procedure Details  Location: thumb - bilateral thumb CMC    Medications (Right): 1 mL lidocaine 1 %; 3 mg betamethasone acetate-betamethasone sodium phosphate 6 (3-3) mg/mLMedications (Left): 1 mL lidocaine 1 %; 3 mg betamethasone acetate-betamethasone sodium phosphate 6 (3-3) mg/mL   Patient tolerance: patient tolerated the procedure well with no immediate complications  Dressing:  Sterile dressing applied            Scribe Attestation    I,:   Israel Ugalde am acting as a scribe while in the presence of the attending physician :        I,:   Buffy Donovan MD personally performed the services described in this documentation    as scribed in my presence :

## 2019-08-17 ENCOUNTER — HOSPITAL ENCOUNTER (EMERGENCY)
Facility: HOSPITAL | Age: 69
Discharge: HOME | End: 2019-08-17
Attending: EMERGENCY MEDICINE
Payer: MEDICARE

## 2019-08-17 ENCOUNTER — APPOINTMENT (EMERGENCY)
Dept: CT IMAGING | Facility: HOSPITAL | Age: 69
End: 2019-08-17
Payer: MEDICARE

## 2019-08-17 VITALS
WEIGHT: 186.95 LBS | BODY MASS INDEX: 28.33 KG/M2 | RESPIRATION RATE: 16 BRPM | SYSTOLIC BLOOD PRESSURE: 142 MMHG | DIASTOLIC BLOOD PRESSURE: 86 MMHG | HEART RATE: 82 BPM | HEIGHT: 68 IN | TEMPERATURE: 98.2 F | OXYGEN SATURATION: 98 %

## 2019-08-17 DIAGNOSIS — M19.90 ARTHRITIS: ICD-10-CM

## 2019-08-17 DIAGNOSIS — M62.838 CERVICAL PARASPINAL MUSCLE SPASM: Primary | ICD-10-CM

## 2019-08-17 PROCEDURE — 99284 EMERGENCY DEPT VISIT MOD MDM: CPT

## 2019-08-17 PROCEDURE — 72125 CT NECK SPINE W/O DYE: CPT

## 2019-08-17 PROCEDURE — 99284 EMERGENCY DEPT VISIT MOD MDM: CPT | Performed by: EMERGENCY MEDICINE

## 2019-08-17 RX ORDER — HYDROCODONE BITARTRATE AND ACETAMINOPHEN 5; 325 MG/1; MG/1
1 TABLET ORAL EVERY 6 HOURS PRN
Qty: 12 TABLET | Refills: 0 | Status: SHIPPED | OUTPATIENT
Start: 2019-08-17 | End: 2019-08-26 | Stop reason: ALTCHOICE

## 2019-08-17 RX ORDER — HYDROCODONE BITARTRATE AND ACETAMINOPHEN 5; 325 MG/1; MG/1
1 TABLET ORAL ONCE
Status: COMPLETED | OUTPATIENT
Start: 2019-08-17 | End: 2019-08-17

## 2019-08-17 RX ORDER — METHOCARBAMOL 500 MG/1
500 TABLET, FILM COATED ORAL 2 TIMES DAILY PRN
Qty: 20 TABLET | Refills: 0 | Status: SHIPPED | OUTPATIENT
Start: 2019-08-17 | End: 2019-08-26 | Stop reason: ALTCHOICE

## 2019-08-17 RX ADMIN — HYDROCODONE BITARTRATE AND ACETAMINOPHEN 1 TABLET: 5; 325 TABLET ORAL at 12:13

## 2019-08-17 NOTE — ED PROVIDER NOTES
History  Chief Complaint   Patient presents with    Neck Stiffness     Patient presents to ED with neck pain for the past three days, pt reports waking up with right sided neck pain that felt a knot in the muscle  pt reprots pain radiating into shoulders and is unable to sleep  This 78-year-old man with history of hypertension, elevated cholesterol, hypothyroidism and arthritis complains of pain and stiffness of his neck  Started 3 or 4 days ago on the right side but has progressed to include the entire posterolateral neck and trapezius muscles bilaterally  The pain/stiffness does not radiate any further than this  He notes no new focal weakness or changes sensation  He denies odynophagia, hoarseness and any change in bowel or bladder control  Symptoms worse when he moves his head and gets up and down from a chair or bed  He has limited range of motion due to the pain  He has never had this before  Prior to Admission Medications   Prescriptions Last Dose Informant Patient Reported? Taking?   levothyroxine 100 mcg tablet   No No   Sig: Take 1 tablet (100 mcg total) by mouth daily   losartan (COZAAR) 100 MG tablet  Self No No   Sig: Take 1 tablet (100 mg total) by mouth daily   simvastatin (ZOCOR) 20 mg tablet  Self No No   Sig: Take 1 tablet (20 mg total) by mouth daily at bedtime   traZODone (DESYREL) 150 mg tablet  Self No No   Sig: Take 0 5 tablets (75 mg total) by mouth daily at bedtime      Facility-Administered Medications: None       Past Medical History:   Diagnosis Date    Disease of thyroid gland     Hypertension     Sleep disorder        Past Surgical History:   Procedure Laterality Date    CHOLECYSTECTOMY      JOINT REPLACEMENT      KNEE SURGERY      LEG WOUND REPAIR / CLOSURE         Family History   Problem Relation Age of Onset    Alcohol abuse Brother     Mental illness Neg Hx      I have reviewed and agree with the history as documented      Social History     Tobacco Use    Smoking status: Former Smoker     Types: Cigarettes, Pipe, Cigars    Smokeless tobacco: Never Used    Tobacco comment: quit in 1983   Substance Use Topics    Alcohol use: Yes     Alcohol/week: 1 0 standard drinks     Types: 1 Glasses of wine per week     Frequency: 4 or more times a week     Drinks per session: 1 or 2     Binge frequency: Never    Drug use: No        Review of Systems   Constitutional: Negative  HENT: Negative  Eyes: Negative  Respiratory: Negative  Cardiovascular: Negative  Gastrointestinal: Negative  Endocrine: Negative  Genitourinary: Negative  Musculoskeletal: Positive for arthralgias, myalgias, neck pain and neck stiffness (  See HPI)  Skin: Negative  Allergic/Immunologic: Negative  Neurological: Negative  Negative for weakness, numbness and headaches  Hematological: Negative  Psychiatric/Behavioral: Negative  All other systems reviewed and are negative  Physical Exam  Physical Exam   Constitutional: He is oriented to person, place, and time  He appears well-developed and well-nourished  No distress  HENT:   Head: Normocephalic and atraumatic  Right Ear: External ear normal    Left Ear: External ear normal    Mouth/Throat: Oropharynx is clear and moist    Eyes: Pupils are equal, round, and reactive to light  Conjunctivae and EOM are normal    Neck: No JVD present  No tracheal deviation present  No thyromegaly present  Tenderness bilateral trapezius muscles  Range of motion is limited secondary to pain  There is no posterior midline deformity or step-off  No warmth, erythema, edema or rash  Cardiovascular: Normal rate, regular rhythm, normal heart sounds and intact distal pulses  No murmur heard  Pulmonary/Chest: Effort normal and breath sounds normal    Abdominal: Soft  Bowel sounds are normal  He exhibits no mass  There is no tenderness  There is no rebound and no guarding     Musculoskeletal: He exhibits tenderness ( trapezius muscle bilaterally)  He exhibits no edema or deformity  Lymphadenopathy:     He has no cervical adenopathy  Neurological: He is alert and oriented to person, place, and time  He has normal reflexes  He displays normal reflexes  No cranial nerve deficit or sensory deficit  He exhibits normal muscle tone  Coordination normal    Skin: Skin is warm and dry  Capillary refill takes less than 2 seconds  No rash noted  He is not diaphoretic  Psychiatric: He has a normal mood and affect  His behavior is normal    Nursing note and vitals reviewed  Vital Signs  ED Triage Vitals [08/17/19 1133]   Temperature Pulse Respirations Blood Pressure SpO2   98 2 °F (36 8 °C) 82 16 142/86 98 %      Temp Source Heart Rate Source Patient Position - Orthostatic VS BP Location FiO2 (%)   Tympanic Monitor Sitting Right arm --      Pain Score       5           Vitals:    08/17/19 1133   BP: 142/86   Pulse: 82   Patient Position - Orthostatic VS: Sitting         Visual Acuity      ED Medications  Medications   HYDROcodone-acetaminophen (NORCO) 5-325 mg per tablet 1 tablet (1 tablet Oral Given 8/17/19 1213)       Diagnostic Studies  Results Reviewed     None                 CT cervical spine without contrast   Final Result by Ashley Tipton MD (08/17 1240)         1  No cervical spine fracture or traumatic malalignment  2   Degenerative changes as described above  No significant central canal stenosis               Workstation performed: [id number]                    Procedures  Procedures       ED Course                               MDM  Number of Diagnoses or Management Options  Arthritis: established and worsening  Cervical paraspinal muscle spasm: new and requires workup     Amount and/or Complexity of Data Reviewed  Tests in the radiology section of CPT®: ordered and reviewed        Disposition  Final diagnoses:   Cervical paraspinal muscle spasm   Arthritis     Time reflects when diagnosis was documented in both MDM as applicable and the Disposition within this note     Time User Action Codes Description Comment    8/17/2019 12:42 PM Richard Langford [J62 [id number]] Cervical paraspinal muscle spasm     8/17/2019 12:50 PM Richard Langford [C24 [id number]] Arthritis       ED Disposition     ED Disposition Condition Date/Time Comment    Discharge Stable Sat Aug 17, 2019 12:42 PM Alec Acuna discharge to home/self care  Follow-up Information     Follow up With Specialties Details Why Contact Info    Gladys Prado MD Internal Medicine Call in 2 days  [address] [address]  [phone]            Discharge Medication List as of 8/17/2019 12:52 PM      START taking these medications    Details   HYDROcodone-acetaminophen (NORCO) 5-325 mg per tablet Take 1 tablet by mouth every 6 (six) hours as needed for pain for up to 10 daysMax Daily Amount: 4 tablets, Starting Sat 8/17/2019, Until Tue 8/27/2019, Normal      methocarbamol (ROBAXIN) 500 mg tablet Take 1 tablet (500 mg total) by mouth 2 (two) times a day as needed for muscle spasms, Starting Sat 8/17/2019, Normal         CONTINUE these medications which have NOT CHANGED    Details   levothyroxine 100 mcg tablet Take 1 tablet (100 mcg total) by mouth daily, Starting Mon 7/1/2019, Normal      losartan (COZAAR) 100 MG tablet Take 1 tablet (100 mg total) by mouth daily, Starting Tue 9/18/2018, Normal      simvastatin (ZOCOR) 20 mg tablet Take 1 tablet (20 mg total) by mouth daily at bedtime, Starting Tue 9/18/2018, Normal      traZODone (DESYREL) 150 mg tablet Take 0 5 tablets (75 mg total) by mouth daily at bedtime, Starting Tue 9/18/2018, Normal           No discharge procedures on file      ED Provider  Electronically Signed by           Brigitte Man DO  08/19/19 5310

## 2019-08-17 NOTE — DISCHARGE INSTRUCTIONS
Continue ibuprofen 400 mg every 8 hours with food  Use the muscle relaxant if needed  You may actually use this up to 3 times a day  Reserve the narcotic for severe pain

## 2019-08-26 ENCOUNTER — OFFICE VISIT (OUTPATIENT)
Dept: FAMILY MEDICINE CLINIC | Facility: HOSPITAL | Age: 69
End: 2019-08-26
Payer: MEDICARE

## 2019-08-26 VITALS
SYSTOLIC BLOOD PRESSURE: 110 MMHG | WEIGHT: 186.8 LBS | BODY MASS INDEX: 28.31 KG/M2 | HEIGHT: 68 IN | HEART RATE: 68 BPM | DIASTOLIC BLOOD PRESSURE: 70 MMHG

## 2019-08-26 DIAGNOSIS — M54.2 CERVICALGIA: Primary | ICD-10-CM

## 2019-08-26 PROCEDURE — 99213 OFFICE O/P EST LOW 20 MIN: CPT | Performed by: INTERNAL MEDICINE

## 2019-08-26 NOTE — PROGRESS NOTES
Assessment/Plan:       Diagnoses and all orders for this visit:    Cervicalgia  -     Ambulatory referral to Physical Therapy; Future          All of the above diagnoses have been assessed  Additional COMMENTS/PLAN: will refer to PT      Subjective:      Patient ID: Tim Ramos is a 68 y o  male  HPI     Neck pain-was in ED 8/17 for still neck  Took some hydrocodone  This is better  Now starting to be active again  No radicular component  The following portions of the patient's history were revised and updated as appropriate: Problem list, allergies, med list, FH, SH, Past medical and surgical histories  Current Outpatient Medications   Medication Sig Dispense Refill    levothyroxine 100 mcg tablet Take 1 tablet (100 mcg total) by mouth daily 90 tablet 3    losartan (COZAAR) 100 MG tablet Take 1 tablet (100 mg total) by mouth daily 90 tablet 3    simvastatin (ZOCOR) 20 mg tablet Take 1 tablet (20 mg total) by mouth daily at bedtime 90 tablet 3    traZODone (DESYREL) 150 mg tablet Take 0 5 tablets (75 mg total) by mouth daily at bedtime 45 tablet 3     No current facility-administered medications for this visit  Review of Systems      Objective:    /70 (BP Location: Left arm, Patient Position: Sitting, Cuff Size: Standard)   Pulse 68   Ht 5' 8" (1 727 m)   Wt 84 7 kg (186 lb 12 8 oz)   BMI 28 40 kg/m²     BP Readings from Last 3 Encounters:   08/26/19 110/70   08/17/19 142/86   07/08/19 122/74                  Wt Readings from Last 3 Encounters:   08/26/19 84 7 kg (186 lb 12 8 oz)   08/17/19 84 8 kg (186 lb 15 2 oz)   07/08/19 84 8 kg (187 lb)         Physical Exam   Constitutional: He appears well-developed and well-nourished  Neck:   Some muscle spasm-dec ROM on R lat rotation  DTRs fine  Vitals reviewed  No visits with results within 2 Week(s) from this visit     Latest known visit with results is:   Appointment on 07/01/2019   Component Date Value Ref Range Status    TSH 3RD GENERATON 07/01/2019 3 268  0 358 - 3 740 uIU/mL Final      Using supplements with high doses of biotin 20 to more than 300 times greater than the adequate daily intake for adults of 30 mcg/day as established by the [place] [facility], can cause falsely depress results      Hemoglobin A1C 07/01/2019 5 6  4 2 - 6 3 % Final    EAG 07/01/2019 114  mg/dl Final         Javon Rosenberg MD

## 2019-08-29 ENCOUNTER — EVALUATION (OUTPATIENT)
Dept: PHYSICAL THERAPY | Facility: CLINIC | Age: 69
End: 2019-08-29
Payer: MEDICARE

## 2019-08-29 DIAGNOSIS — M75.41 IMPINGEMENT SYNDROME OF RIGHT SHOULDER: ICD-10-CM

## 2019-08-29 DIAGNOSIS — M54.2 CERVICALGIA: Primary | ICD-10-CM

## 2019-08-29 PROCEDURE — 97161 PT EVAL LOW COMPLEX 20 MIN: CPT | Performed by: PHYSICAL THERAPIST

## 2019-08-29 PROCEDURE — 97110 THERAPEUTIC EXERCISES: CPT | Performed by: PHYSICAL THERAPIST

## 2019-08-29 NOTE — PROGRESS NOTES
PT Evaluation     Today's date: 2019  Patient name: Norman Denise  : 1950  MRN: [id number]  Referring provider: Navya Cordoba MD  Dx:   Encounter Diagnosis     ICD-10-CM    1  Impingement syndrome of right shoulder M75 41    2  Cervicalgia M54 2 Ambulatory referral to Physical Therapy       Start Time: 6643  Stop Time: 0830  Total time in clinic (min): 45 minutes    Assessment  Assessment details: Norman Denise is a 68 y o  male presenting to outpatient physical therapy at [place] [address] with complaints of cervical pain  He presents with decreased range of motion, decreased strength, limited flexibility, poor postural awareness, decreased tolerance to activity and decreased functional mobility due to cervicalgia as well as signs of R shoulder impingement  He would benefit from skilled PT services in order to address these deficits and reach maximum level of function  Thank you for the referral!  Impairments: abnormal or restricted ROM, activity intolerance, impaired balance, impaired physical strength, lacks appropriate home exercise program, pain with function and poor posture   Barriers to therapy: None  Understanding of Dx/Px/POC: excellent  Goals  ST  Independent with HEP in 2 weeks  2  Increase AROM c-spine to WNL all motions in 3 weeks   3  Good postural awareness in 2 weeks    LT  Achieve FOTO score of 72/100 in 8 weeks   2  Able to lift 30# to do home remodeling without cervical or R UE pain in 8 weeks  3  Strength traps = 5/5 B in 8 weeks  4    No tightness R UT or cervical paraspinals in 8 weeks    Plan  Patient would benefit from: skilled PT  Planned modality interventions: cryotherapy and thermotherapy: hydrocollator packs  Planned therapy interventions: ADL retraining, body mechanics training, flexibility, functional ROM exercises, home exercise program, joint mobilization, manual therapy, neuromuscular re-education, postural training, strengthening, stretching, therapeutic activities and therapeutic exercise  Frequency: 2x week  Duration in weeks: 8  Treatment plan discussed with: patient        Subjective Evaluation    History of Present Illness  Mechanism of injury: Pt reports having cervical pain since a few weeks ago with some stiffness without injury  Last week pain became severe  Went to ER and had a CT that was normal   Has been remodeling his home recently and thinks that may be some of the cause of his pain  Also has some R AC jt pain  Pt has HTN  Retired as an   Not a recurrent problem   Quality of life: good    Pain  Current pain ratin  At best pain rating: 3  At worst pain ratin  Quality: sharp, dull ache and tight  Relieving factors: rest  Aggravating factors: lifting and overhead activity  Progression: improved    Social Support  Lives in: multiple-level home  Lives with: spouse    Employment status: not working  Hand dominance: right      Diagnostic Tests  CT scan: normal  Treatments  No previous or current treatments  Patient Goals  Patient goals for therapy: decreased pain, increased motion, increased strength and return to sport/leisure activities          Objective     Static Posture     Head  Forward  Shoulders  Rounded  Postural Observations  Seated posture: fair  Standing posture: fair  Correction of posture: makes symptoms better        Palpation   Left   No palpable tenderness to the cervical paraspinals and suboccipitals  Right   Hypertonic in the cervical paraspinals and suboccipitals  Tenderness   Cervical Spine   Tenderness in the facet joint (R C3-4)  No tenderness in the spinous process       Neurological Testing     Sensation   Cervical/Thoracic   Left   Intact: light touch    Right   Intact: light touch    Shoulder   Left Shoulder   Intact: light touch    Right Shoulder   Intact: light touch    Reflexes     Right   Biceps (C5/C6): normal (2+)  Brachioradialis (C6): normal (2+)    Active Range of Motion   Cervical/Thoracic Spine       Cervical    Flexion:  WFL  Extension:  WFL  Left lateral flexion:  Restriction level: moderate  Right lateral flexion:  Restriction level minimal  Left rotation:  Restriction level: moderate  Right rotation:  Restriction level: minimal  Left Shoulder   Normal active range of motion    Right Shoulder   Normal active range of motion    Strength/Myotome Testing     Left Shoulder     Planes of Motion   Flexion: 5   Extension: 5   Abduction: 5   External rotation at 0°: 5   Internal rotation at 0°: 5     Isolated Muscles   Middle trapezius: 4     Right Shoulder     Planes of Motion   Flexion: 5   Extension: 5   Abduction: 5   External rotation at 0°: 5   Internal rotation at 0°: 5     Isolated Muscles   Middle trapezius: 4     Tests   Cervical   Negative vertical compression  Left Shoulder   Negative ULTT1  Right Shoulder   Positive Hawkin's  Negative empty can, Neer's and ULTT1  Lumbar   Negative vertical compression  Graphical documentation      Flowsheet Rows      Most Recent Value   PT/OT G-Codes   Current Score  60   Projected Score  72             Daily Treatment Diary     Dx:    1  Impingement syndrome of right shoulder    2   Cervicalgia      POC EXPIRES On:  10/24/19  PRECAUTIONS:  None  CO-MORBIDITES:  HTN  PERSONAL FACTORS:  Pt away 9/9/19 - 9/27/19    Manual              STM R UT NV            STM cervical paraspinals R>L NV            Manual cervical traction NV                                          Exercise Diary  8/29            Retro UBE NV            T-band rows B Blue 20            Doorway pec stretch  15" 5            Seated chin tucks 5" 5            T-band LPD B NV            T-band shoulder ER NV            Prone mid traps L/R              Prone rows L/R                                                                                                                                                                             Modalities

## 2019-08-30 DIAGNOSIS — M19.90 ARTHRITIS: Primary | ICD-10-CM

## 2019-08-30 RX ORDER — METHOCARBAMOL 500 MG/1
TABLET, FILM COATED ORAL
Qty: 30 TABLET | Refills: 0 | Status: SHIPPED | OUTPATIENT
Start: 2019-08-30 | End: 2019-10-15 | Stop reason: ALTCHOICE

## 2019-09-04 ENCOUNTER — OFFICE VISIT (OUTPATIENT)
Dept: PHYSICAL THERAPY | Facility: CLINIC | Age: 69
End: 2019-09-04
Payer: MEDICARE

## 2019-09-04 DIAGNOSIS — M75.41 IMPINGEMENT SYNDROME OF RIGHT SHOULDER: ICD-10-CM

## 2019-09-04 DIAGNOSIS — M54.2 CERVICALGIA: Primary | ICD-10-CM

## 2019-09-04 PROCEDURE — 97110 THERAPEUTIC EXERCISES: CPT

## 2019-09-04 PROCEDURE — 97140 MANUAL THERAPY 1/> REGIONS: CPT

## 2019-09-04 NOTE — PROGRESS NOTES
Daily Note     Today's date: 2019  Patient name: Hugo Herndon  : 1950  MRN: [id number]  Referring provider: Alec Carrillo MD  Dx:   Encounter Diagnosis     ICD-10-CM    1  Cervicalgia M54 2    2  Impingement syndrome of right shoulder M75 41                   Subjective: Pt states feeling good after performing his HEP  Objective: See treatment diary below      Assessment: Pt responded great to manuals with increased cervical ROM  In addition, pt tolerated below TE well without increased pain or discomfort  Pt required minimal vcs to maintain good form  Plan: Continue plan of care    Daily Treatment Diary     Dx:    1  Impingement syndrome of right shoulder    2   Cervicalgia      POC EXPIRES On:  10/24/19  PRECAUTIONS:  None  CO-MORBIDITES:  HTN  PERSONAL FACTORS:  Pt away 19 - 19    Manual              STM R UT NV 4           STM cervical paraspinals R>L NV 4           Manual cervical traction NV 2                        Total Time  10               Exercise Diary             Retro UBE NV 6'           T-band rows B Blue 20 Blue 20           Doorway pec stretch  15" 5 15" 5           Seated chin tucks 5" 5 5" 5           T-band LPD B NV Blue 20           T-band shoulder ER NV Blue 20           Prone mid traps L/R   Ext  5# 20           Prone rows L/R  5# 20                                                                                                                                                                           Modalities

## 2019-09-06 ENCOUNTER — OFFICE VISIT (OUTPATIENT)
Dept: PHYSICAL THERAPY | Facility: CLINIC | Age: 69
End: 2019-09-06
Payer: MEDICARE

## 2019-09-06 DIAGNOSIS — M54.2 CERVICALGIA: Primary | ICD-10-CM

## 2019-09-06 DIAGNOSIS — M75.41 IMPINGEMENT SYNDROME OF RIGHT SHOULDER: ICD-10-CM

## 2019-09-06 PROCEDURE — 97110 THERAPEUTIC EXERCISES: CPT

## 2019-09-06 PROCEDURE — 97140 MANUAL THERAPY 1/> REGIONS: CPT

## 2019-09-30 ENCOUNTER — OFFICE VISIT (OUTPATIENT)
Dept: PHYSICAL THERAPY | Facility: CLINIC | Age: 69
End: 2019-09-30
Payer: MEDICARE

## 2019-09-30 DIAGNOSIS — M75.41 IMPINGEMENT SYNDROME OF RIGHT SHOULDER: ICD-10-CM

## 2019-09-30 DIAGNOSIS — E03.9 ACQUIRED HYPOTHYROIDISM: ICD-10-CM

## 2019-09-30 DIAGNOSIS — M54.2 CERVICALGIA: Primary | ICD-10-CM

## 2019-09-30 DIAGNOSIS — I10 ESSENTIAL HYPERTENSION: ICD-10-CM

## 2019-09-30 PROCEDURE — 97164 PT RE-EVAL EST PLAN CARE: CPT | Performed by: PHYSICAL THERAPIST

## 2019-09-30 PROCEDURE — 97110 THERAPEUTIC EXERCISES: CPT | Performed by: PHYSICAL THERAPIST

## 2019-09-30 RX ORDER — SIMVASTATIN 20 MG
20 TABLET ORAL
Qty: 90 TABLET | Refills: 3 | Status: SHIPPED | OUTPATIENT
Start: 2019-09-30 | End: 2020-09-10 | Stop reason: SDUPTHER

## 2019-09-30 RX ORDER — LEVOTHYROXINE SODIUM 0.1 MG/1
100 TABLET ORAL DAILY
Qty: 90 TABLET | Refills: 3 | Status: SHIPPED | OUTPATIENT
Start: 2019-09-30 | End: 2020-09-10 | Stop reason: SDUPTHER

## 2019-09-30 RX ORDER — TRAZODONE HYDROCHLORIDE 150 MG/1
75 TABLET ORAL
Qty: 45 TABLET | Refills: 3 | Status: SHIPPED | OUTPATIENT
Start: 2019-09-30 | End: 2020-09-10 | Stop reason: SDUPTHER

## 2019-09-30 RX ORDER — LOSARTAN POTASSIUM 100 MG/1
100 TABLET ORAL DAILY
Qty: 90 TABLET | Refills: 3 | Status: SHIPPED | OUTPATIENT
Start: 2019-09-30 | End: 2020-09-10 | Stop reason: SDUPTHER

## 2019-09-30 NOTE — PROGRESS NOTES
PT Discharge    Today's date: 2019  Patient name: Rey Franklin  : 1950  MRN: [id number]  Referring provider: Mira Butt MD  Dx:   Encounter Diagnosis     ICD-10-CM    1  Cervicalgia M54 2    2  Impingement syndrome of right shoulder M75 41                   Assessment  Assessment details: Rey Franklin is a 68 y o  male who presented to outpatient physical therapy at [place] [address] with complaints of cervical pain  He presented with decreased range of motion, decreased strength, limited flexibility, poor postural awareness, decreased tolerance to activity and decreased functional mobility due to cervicalgia as well as signs of R shoulder impingement  He has made excellent progression and has met all goals  He is ready for D/C to HEP at this time  Barriers to therapy: None  Understanding of Dx/Px/POC: excellent  Goals  ST  Independent with HEP in 2 weeks - Met  2  Increase AROM c-spine to WNL all motions in 3 weeks - Met   3  Good postural awareness in 2 weeks - Met    LT  Achieve FOTO score of 72/100 in 8 weeks - Met  2  Able to lift 30# to do home remodeling without cervical or R UE pain in 8 weeks - Met  3  Strength traps = 5/5 B in 8 weeks - Met  4  No tightness R UT or cervical paraspinals in 8 weeks - Met    Plan  Treatment plan discussed with: patient        Subjective Evaluation    History of Present Illness  Mechanism of injury: Pt reports having cervical pain that started a few weeks ago with some stiffness without injury, then became severe  Went to ER and had a CT that was normal   Has been remodeling his home and thinks that may be some of the cause of his pain  Also has some R AC jt pain  Pt has HTN  Retired as an   Feeling great now             Not a recurrent problem   Quality of life: excellent    Pain  Current pain ratin  At best pain ratin  At worst pain ratin  Progression: resolved    Social Support  Lives in: multiple-level home  Lives with: spouse    Employment status: not working  Hand dominance: right      Diagnostic Tests  CT scan: normal  Treatments  Current treatment: physical therapy        Objective     Postural Observations  Seated posture: good  Standing posture: good  Correction of posture: makes symptoms better        Palpation   Left   No palpable tenderness to the cervical paraspinals and suboccipitals  Right   No palpable tenderness to the cervical paraspinals and suboccipitals  Tenderness   Cervical Spine   No tenderness in the facet joint and spinous process  Neurological Testing     Sensation   Cervical/Thoracic   Left   Intact: light touch    Right   Intact: light touch    Shoulder   Left Shoulder   Intact: light touch    Right Shoulder   Intact: light touch    Reflexes     Right   Biceps (C5/C6): normal (2+)  Brachioradialis (C6): normal (2+)    Active Range of Motion   Cervical/Thoracic Spine       Cervical    Flexion:  WFL  Extension:  WFL  Left lateral flexion:  WFL  Right lateral flexion:  WFL  Left rotation:  WFL  Right rotation:  WFL  Left Shoulder   Normal active range of motion    Right Shoulder   Normal active range of motion    Strength/Myotome Testing     Left Shoulder     Planes of Motion   Flexion: 5   Extension: 5   Abduction: 5   External rotation at 0°: 5   Internal rotation at 0°: 5     Isolated Muscles   Middle trapezius: 5     Right Shoulder     Planes of Motion   Flexion: 5   Extension: 5   Abduction: 5   External rotation at 0°: 5   Internal rotation at 0°: 5     Isolated Muscles   Middle trapezius: 5     Tests   Cervical   Negative vertical compression  Left Shoulder   Negative ULTT1  Right Shoulder   Positive Hawkin's  Negative empty can, Neer's and ULTT1  Lumbar   Negative vertical compression  Daily Treatment Diary     Dx:    1  Cervicalgia    2   Impingement syndrome of right shoulder      POC EXPIRES On:  10/24/19  PRECAUTIONS:  None  CO-MORBIDITES: HTN  PERSONAL FACTORS:  Pt away 9/9/19 - 9/27/19     Manual    9/4 9/6                 STM R UT NV 4 5                 STM cervical paraspinals R>L NV 4 5                 Manual cervical traction NV 2 5                                         Total Time   10 15                       Exercise Diary  8/29 9/4 9/6 9/30               Retro UBE NV 6' 6'  L4 5'               T-band rows B Blue 20 Blue 20 Blue 20  Blue 20               Doorway pec stretch  15" 5 15" 5 15"5                  Seated chin tucks 5" 5 5" 5 5" 15                 T-band LPD B NV Blue 20 Blue 20                 T-band shoulder ER NV Blue 20 Blue 20                 Prone mid traps L/R    Ext  5# 20                   Prone rows L/R   5# 20                                                                                                                                                                                                                                                                                                                         Modalities

## 2019-10-07 ENCOUNTER — OFFICE VISIT (OUTPATIENT)
Dept: OBGYN CLINIC | Facility: CLINIC | Age: 69
End: 2019-10-07
Payer: MEDICARE

## 2019-10-07 VITALS
DIASTOLIC BLOOD PRESSURE: 77 MMHG | SYSTOLIC BLOOD PRESSURE: 131 MMHG | WEIGHT: 188 LBS | BODY MASS INDEX: 28.49 KG/M2 | HEART RATE: 71 BPM | HEIGHT: 68 IN

## 2019-10-07 DIAGNOSIS — M18.0 ARTHRITIS OF CARPOMETACARPAL (CMC) JOINT OF BOTH THUMBS: Primary | ICD-10-CM

## 2019-10-07 PROCEDURE — 99213 OFFICE O/P EST LOW 20 MIN: CPT | Performed by: ORTHOPAEDIC SURGERY

## 2019-10-07 PROCEDURE — 20600 DRAIN/INJ JOINT/BURSA W/O US: CPT | Performed by: ORTHOPAEDIC SURGERY

## 2019-10-07 RX ORDER — AMOXICILLIN 500 MG/1
500 CAPSULE ORAL
COMMUNITY

## 2019-10-07 RX ORDER — BETAMETHASONE SODIUM PHOSPHATE AND BETAMETHASONE ACETATE 3; 3 MG/ML; MG/ML
6 INJECTION, SUSPENSION INTRA-ARTICULAR; INTRALESIONAL; INTRAMUSCULAR; SOFT TISSUE
Status: COMPLETED | OUTPATIENT
Start: 2019-10-07 | End: 2019-10-07

## 2019-10-07 RX ORDER — LIDOCAINE HYDROCHLORIDE 10 MG/ML
1 INJECTION, SOLUTION INFILTRATION; PERINEURAL
Status: COMPLETED | OUTPATIENT
Start: 2019-10-07 | End: 2019-10-07

## 2019-10-07 RX ADMIN — LIDOCAINE HYDROCHLORIDE 1 ML: 10 INJECTION, SOLUTION INFILTRATION; PERINEURAL at 10:57

## 2019-10-07 RX ADMIN — LIDOCAINE HYDROCHLORIDE 1 ML: 10 INJECTION, SOLUTION INFILTRATION; PERINEURAL at 10:55

## 2019-10-07 RX ADMIN — BETAMETHASONE SODIUM PHOSPHATE AND BETAMETHASONE ACETATE 6 MG: 3; 3 INJECTION, SUSPENSION INTRA-ARTICULAR; INTRALESIONAL; INTRAMUSCULAR; SOFT TISSUE at 10:57

## 2019-10-07 RX ADMIN — BETAMETHASONE SODIUM PHOSPHATE AND BETAMETHASONE ACETATE 6 MG: 3; 3 INJECTION, SUSPENSION INTRA-ARTICULAR; INTRALESIONAL; INTRAMUSCULAR; SOFT TISSUE at 10:55

## 2019-10-07 NOTE — PROGRESS NOTES
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     Plan:   Resume activities as tolerated, steroid injections, NSAIDs, activity modification and pain relief creams    Follow Up:  PRN as patient is going to [place] for the winter, he will call when he returns and the injection wears off  To Do Next Visit:  Reexamination     General Discussions:     [facility] Arthritis: The anatomy and physiology of carpometacarpal joint arthritis was discussed with the patient today in the office  Deterioration of the articular cartilage eventually leads to hypermobility at the thumb [facility] joint, resulting in joint subluxation, osteophyte formation, cystic changes within the trapezium and base of the first metacarpal, as well as subchondral sclerosis  Eventually, pain, limited mobility, and compensatory hyperextension at the metacarpophalangeal joint may develop  While normal activity and usage of the thumb joint may provide a painful experience to the patient, this typically does not result in damage to the thumb or hand  Treatment options include resting thumb spica splints to decreased joint edema, pain, and inflammation  Therapy exercises to strengthen the thenar musculature may relieve pain, but do not alter the overall continued development of osteoarthritis  Oral medications, topical medications, corticosteroid injections may decrease pain and increase overall function  Eventually, approximately 5% of patients may require surgical intervention  _____________________________________________________  CHIEF COMPLAINT:  Chief Complaint   Patient presents with    Left Thumb - Follow-up    Right Thumb - Follow-up         SUBJECTIVE:  Cihnmay Hewitt is a 68 y o  male who presents for follow up regarding Thumb [facility] Arthritis  bilateral   Since last visit, Chinmay Hewitt has tried Resume activities as tolerated with relief    Patient states that he received roughly 2 months of relief from his injection prior to returning of the pain at the base of the thumbs  Patient is right-hand dominant     Today there is Pain  Mild  Intermittant  Dull to the bases of the bilateral thumbs  Denies any numbness or tingling to the thumbs or adjacent digits  Patient will be amenable to repeat round of corticosteroid injection  No other complaints  Radiation: None  Associated symptoms: None    PAST MEDICAL HISTORY:  Past Medical History:   Diagnosis Date    Disease of thyroid gland     Hypertension     Sleep disorder        PAST SURGICAL HISTORY:  Past Surgical History:   Procedure Laterality Date    CHOLECYSTECTOMY      JOINT REPLACEMENT      KNEE SURGERY      LEG WOUND REPAIR / CLOSURE         FAMILY HISTORY:  Family History   Problem Relation Age of Onset    Alcohol abuse Brother     Mental illness Neg Hx        SOCIAL HISTORY:  Social History     Tobacco Use    Smoking status: Former Smoker     Types: Cigarettes, Pipe, Cigars    Smokeless tobacco: Never Used    Tobacco comment: quit in 1983   Substance Use Topics    Alcohol use:  Yes     Alcohol/week: 1 0 standard drinks     Types: 1 Glasses of wine per week     Frequency: 4 or more times a week     Drinks per session: 1 or 2     Binge frequency: Never    Drug use: No       MEDICATIONS:    Current Outpatient Medications:     amoxicillin (AMOXIL) 500 mg capsule, Take 500 mg by mouth Prior to dental, Disp: , Rfl:     levothyroxine 100 mcg tablet, Take 1 tablet (100 mcg total) by mouth daily, Disp: 90 tablet, Rfl: 3    losartan (COZAAR) 100 MG tablet, Take 1 tablet (100 mg total) by mouth daily, Disp: 90 tablet, Rfl: 3    simvastatin (ZOCOR) 20 mg tablet, Take 1 tablet (20 mg total) by mouth daily at bedtime, Disp: 90 tablet, Rfl: 3    traZODone (DESYREL) 150 mg tablet, Take 0 5 tablets (75 mg total) by mouth daily at bedtime, Disp: 45 tablet, Rfl: 3    methocarbamol (ROBAXIN) 500 mg tablet, Take 1 tab twice daily prn muscle spasms (Patient not taking: Reported on 10/7/2019), Disp: 30 tablet, Rfl: 0    ALLERGIES:  No Known Allergies    REVIEW OF SYSTEMS:  Pertinent items are noted in HPI  A comprehensive review of systems was negative  LABS:  HgA1c:   Lab Results   Component Value Date    HGBA1C 5 6 07/01/2019     BMP:   Lab Results   Component Value Date    CALCIUM 9 1 06/25/2019    K 4 2 06/25/2019    CO2 26 06/25/2019     06/25/2019    BUN 19 06/25/2019    CREATININE 0 95 06/25/2019           _____________________________________________________  PHYSICAL EXAMINATION:  Vital signs: /77   Pulse 71   Ht 5' 8" (1 727 m)   Wt 85 3 kg (188 lb)   BMI 28 59 kg/m²   General: well developed and well nourished, alert, oriented times 3 and appears comfortable  Psychiatric: Normal  HEENT: Trachea Midline, No torticollis  Cardiovascular: No discernable arrhythmia  Pulmonary: No wheezing or stridor  Skin: No masses, erythema, lacerations, fluctation, ulcerations  Neurovascular: Sensation Intact to the Median, Ulnar, Radial Nerve, Motor Intact to the Median, Ulnar, Radial Nerve and Pulses Intact    MUSCULOSKELETAL EXAMINATION:  LEFT SIDE:  CMC: No Instability, Positive grind, Positive tendnerness CMC and Positive Tenderness STT  RIGHT SIDE:  CMC: No Instability, Positive grind, Positive tendnerness CMC and Positive Tenderness STT   Flexion extensor tendons are intact, full range of motion noted  _____________________________________________________  STUDIES REVIEWED:  No Studies to review and Images were reviewd in PACS:  Radiographs of bilateral hands were reviewed today by myself and Dr Layla RICKS reveals degenerative changes of bilateral CMC joints of the thumb indicated by joint space narrowing, sclerosis and osteophyte formation        PROCEDURES PERFORMED:  Small joint arthrocentesis: R thumb CMC  Date/Time: 10/7/2019 10:55 AM  Site marked: site marked  Supporting Documentation  Indications: pain   Procedure Details  Location: thumb - R thumb CMC  Needle size: 25 G  Ultrasound guidance: no  Approach: radial  Medications administered: 1 mL lidocaine 1 %; 6 mg betamethasone acetate-betamethasone sodium phosphate 6 (3-3) mg/mL    Patient tolerance: patient tolerated the procedure well with no immediate complications  Dressing:  Sterile dressing applied    Small joint arthrocentesis: L thumb CMC  Date/Time: 10/7/2019 10:57 AM  Site marked: site marked  Procedure Details  Location: thumb - L thumb CMC  Needle size: 25 G  Ultrasound guidance: no  Approach: radial  Medications administered: 1 mL lidocaine 1 %; 6 mg betamethasone acetate-betamethasone sodium phosphate 6 (3-3) mg/mL    Patient tolerance: patient tolerated the procedure well with no immediate complications  Dressing:  Sterile dressing applied            I interviewed, took the history and examined the patient  I discuss the case with the resident and reviewed the resident's note  I supervised the resident and I agree with the resident management plan as it was presented to me  I was present in the clinic and examined the patient

## 2019-10-08 ENCOUNTER — TRANSCRIBE ORDERS (OUTPATIENT)
Dept: ADMINISTRATIVE | Facility: HOSPITAL | Age: 69
End: 2019-10-08

## 2019-10-08 ENCOUNTER — APPOINTMENT (OUTPATIENT)
Dept: LAB | Facility: HOSPITAL | Age: 69
End: 2019-10-08
Attending: INTERNAL MEDICINE
Payer: MEDICARE

## 2019-10-08 DIAGNOSIS — I51.9 MYXEDEMA HEART DISEASE: Primary | ICD-10-CM

## 2019-10-08 DIAGNOSIS — I51.9 MYXEDEMA HEART DISEASE: ICD-10-CM

## 2019-10-08 DIAGNOSIS — E03.9 MYXEDEMA HEART DISEASE: Primary | ICD-10-CM

## 2019-10-08 DIAGNOSIS — E03.9 MYXEDEMA HEART DISEASE: ICD-10-CM

## 2019-10-08 LAB
EST. AVERAGE GLUCOSE BLD GHB EST-MCNC: 108 MG/DL
HBA1C MFR BLD: 5.4 % (ref 4.2–6.3)
TSH SERPL DL<=0.05 MIU/L-ACNC: 1.74 UIU/ML (ref 0.36–3.74)

## 2019-10-08 PROCEDURE — 36415 COLL VENOUS BLD VENIPUNCTURE: CPT

## 2019-10-08 PROCEDURE — 84443 ASSAY THYROID STIM HORMONE: CPT

## 2019-10-08 PROCEDURE — 83036 HEMOGLOBIN GLYCOSYLATED A1C: CPT

## 2019-10-15 ENCOUNTER — OFFICE VISIT (OUTPATIENT)
Dept: FAMILY MEDICINE CLINIC | Facility: HOSPITAL | Age: 69
End: 2019-10-15
Payer: MEDICARE

## 2019-10-15 VITALS
DIASTOLIC BLOOD PRESSURE: 70 MMHG | WEIGHT: 190 LBS | SYSTOLIC BLOOD PRESSURE: 120 MMHG | BODY MASS INDEX: 28.79 KG/M2 | HEART RATE: 86 BPM | HEIGHT: 68 IN

## 2019-10-15 DIAGNOSIS — Z12.5 ENCOUNTER FOR PROSTATE CANCER SCREENING: ICD-10-CM

## 2019-10-15 DIAGNOSIS — E03.9 ACQUIRED HYPOTHYROIDISM: ICD-10-CM

## 2019-10-15 DIAGNOSIS — R73.01 IFG (IMPAIRED FASTING GLUCOSE): ICD-10-CM

## 2019-10-15 DIAGNOSIS — I10 ESSENTIAL HYPERTENSION: ICD-10-CM

## 2019-10-15 DIAGNOSIS — Z23 NEED FOR VACCINATION: Primary | ICD-10-CM

## 2019-10-15 DIAGNOSIS — E78.00 PURE HYPERCHOLESTEROLEMIA: ICD-10-CM

## 2019-10-15 PROCEDURE — G0008 ADMIN INFLUENZA VIRUS VAC: HCPCS

## 2019-10-15 PROCEDURE — 99213 OFFICE O/P EST LOW 20 MIN: CPT | Performed by: INTERNAL MEDICINE

## 2019-10-15 PROCEDURE — 90662 IIV NO PRSV INCREASED AG IM: CPT

## 2019-10-15 NOTE — PROGRESS NOTES
Assessment/Plan:       Diagnoses and all orders for this visit:    Need for vaccination  -     influenza vaccine, [id number], high-dose, PF 0 5 mL (FLUZONE HIGH-DOSE)    IFG (impaired fasting glucose)  -     Hemoglobin A1C; Future    Essential hypertension  -     CBC; Future  -     Comprehensive metabolic panel; Future    Pure hypercholesterolemia  -     Lipid Panel with Direct LDL reflex; Future    Acquired hypothyroidism  -     TSH, 3rd generation; Future    Encounter for prostate cancer screening  -     PSA, Total Screen; Future          All of the above diagnoses have been assessed  Additional COMMENTS/PLAN: doing well  Subjective:      Patient ID: Mery Jones is a 68 y o  male  HPI     Here tp f/u on glucoses-glyco has been under better control  Worse glyco was 6 4    HTn-compliant with meds    Neck pain-this is better  The following portions of the patient's history were revised and updated as appropriate: Problem list, allergies, med list, FH, SH, Past medical and surgical histories  Current Outpatient Medications   Medication Sig Dispense Refill    amoxicillin (AMOXIL) 500 mg capsule Take 500 mg by mouth Prior to dental      levothyroxine 100 mcg tablet Take 1 tablet (100 mcg total) by mouth daily 90 tablet 3    losartan (COZAAR) 100 MG tablet Take 1 tablet (100 mg total) by mouth daily 90 tablet 3    simvastatin (ZOCOR) 20 mg tablet Take 1 tablet (20 mg total) by mouth daily at bedtime 90 tablet 3    traZODone (DESYREL) 150 mg tablet Take 0 5 tablets (75 mg total) by mouth daily at bedtime 45 tablet 3     No current facility-administered medications for this visit  Review of Systems   All other systems reviewed and are negative          Objective:    /70 (BP Location: Left arm, Patient Position: Sitting, Cuff Size: Standard)   Pulse 86   Ht 5' 8" (1 727 m)   Wt 86 2 kg (190 lb)   BMI 28 89 kg/m²     BP Readings from Last 3 Encounters:   10/15/19 120/70   10/07/19 131/77   08/26/19 110/70                  Wt Readings from Last 3 Encounters:   10/15/19 86 2 kg (190 lb)   10/07/19 85 3 kg (188 lb)   08/26/19 84 7 kg (186 lb 12 8 oz)         Physical Exam   Neck:   ROM returned  Vitals reviewed  Appointment on 10/08/2019   Component Date Value Ref Range Status    TSH 3RD GENERATON 10/08/2019 1 735  0 358 - 3 740 uIU/mL Final      Using supplements with high doses of biotin 20 to more than 300 times greater than the adequate daily intake for adults of 30 mcg/day as established by the [place] of Medicine, can cause falsely depress results      Hemoglobin A1C 10/08/2019 5 4  4 2 - 6 3 % Final    EAG 10/08/2019 108  mg/dl Final         Kamila Stovall MD

## 2020-07-09 DIAGNOSIS — Z23 NEED FOR SHINGLES VACCINE: Primary | ICD-10-CM

## 2020-09-10 DIAGNOSIS — E03.9 ACQUIRED HYPOTHYROIDISM: ICD-10-CM

## 2020-09-10 DIAGNOSIS — I10 ESSENTIAL HYPERTENSION: ICD-10-CM

## 2020-09-10 RX ORDER — SIMVASTATIN 20 MG
20 TABLET ORAL
Qty: 90 TABLET | Refills: 3 | Status: SHIPPED | OUTPATIENT
Start: 2020-09-10 | End: 2021-10-20 | Stop reason: SDUPTHER

## 2020-09-10 RX ORDER — LEVOTHYROXINE SODIUM 0.1 MG/1
100 TABLET ORAL DAILY
Qty: 90 TABLET | Refills: 3 | Status: SHIPPED | OUTPATIENT
Start: 2020-09-10 | End: 2020-10-06

## 2020-09-10 RX ORDER — TRAZODONE HYDROCHLORIDE 150 MG/1
75 TABLET ORAL
Qty: 45 TABLET | Refills: 3 | Status: SHIPPED | OUTPATIENT
Start: 2020-09-10 | End: 2021-09-14 | Stop reason: SDUPTHER

## 2020-09-10 RX ORDER — LOSARTAN POTASSIUM 100 MG/1
100 TABLET ORAL DAILY
Qty: 90 TABLET | Refills: 3 | Status: SHIPPED | OUTPATIENT
Start: 2020-09-10 | End: 2021-10-20 | Stop reason: SDUPTHER

## 2020-09-29 ENCOUNTER — APPOINTMENT (OUTPATIENT)
Dept: LAB | Facility: HOSPITAL | Age: 70
End: 2020-09-29
Attending: INTERNAL MEDICINE
Payer: MEDICARE

## 2020-09-29 DIAGNOSIS — R73.01 IFG (IMPAIRED FASTING GLUCOSE): ICD-10-CM

## 2020-09-29 DIAGNOSIS — I10 ESSENTIAL HYPERTENSION: ICD-10-CM

## 2020-09-29 DIAGNOSIS — E78.00 PURE HYPERCHOLESTEROLEMIA: ICD-10-CM

## 2020-09-29 DIAGNOSIS — Z12.5 ENCOUNTER FOR PROSTATE CANCER SCREENING: ICD-10-CM

## 2020-09-29 DIAGNOSIS — E03.9 ACQUIRED HYPOTHYROIDISM: ICD-10-CM

## 2020-09-29 LAB
ALBUMIN SERPL BCP-MCNC: 4.3 G/DL (ref 3.5–5)
ALP SERPL-CCNC: 85 U/L (ref 46–116)
ALT SERPL W P-5'-P-CCNC: 24 U/L (ref 12–78)
ANION GAP SERPL CALCULATED.3IONS-SCNC: 5 MMOL/L (ref 4–13)
AST SERPL W P-5'-P-CCNC: 14 U/L (ref 5–45)
BILIRUB SERPL-MCNC: 0.68 MG/DL (ref 0.2–1)
BUN SERPL-MCNC: 23 MG/DL (ref 5–25)
CALCIUM SERPL-MCNC: 9.2 MG/DL (ref 8.3–10.1)
CHLORIDE SERPL-SCNC: 109 MMOL/L (ref 100–108)
CHOLEST SERPL-MCNC: 188 MG/DL (ref 50–200)
CO2 SERPL-SCNC: 27 MMOL/L (ref 21–32)
CREAT SERPL-MCNC: 1.03 MG/DL (ref 0.6–1.3)
ERYTHROCYTE [DISTWIDTH] IN BLOOD BY AUTOMATED COUNT: 12.7 % (ref 11.6–15.1)
EST. AVERAGE GLUCOSE BLD GHB EST-MCNC: 117 MG/DL
GFR SERPL CREATININE-BSD FRML MDRD: 72 ML/MIN/1.73SQ M
GLUCOSE P FAST SERPL-MCNC: 113 MG/DL (ref 65–99)
HBA1C MFR BLD: 5.7 %
HCT VFR BLD AUTO: 40.5 % (ref 36.5–49.3)
HDLC SERPL-MCNC: 54 MG/DL
HGB BLD-MCNC: 13.2 G/DL (ref 12–17)
LDLC SERPL CALC-MCNC: 108 MG/DL (ref 0–100)
MCH RBC QN AUTO: 31.4 PG (ref 26.8–34.3)
MCHC RBC AUTO-ENTMCNC: 32.6 G/DL (ref 31.4–37.4)
MCV RBC AUTO: 96 FL (ref 82–98)
PLATELET # BLD AUTO: 224 THOUSANDS/UL (ref 149–390)
PMV BLD AUTO: 9.9 FL (ref 8.9–12.7)
POTASSIUM SERPL-SCNC: 4.5 MMOL/L (ref 3.5–5.3)
PROT SERPL-MCNC: 7.2 G/DL (ref 6.4–8.2)
PSA SERPL-MCNC: 1 NG/ML (ref 0–4)
RBC # BLD AUTO: 4.21 MILLION/UL (ref 3.88–5.62)
SODIUM SERPL-SCNC: 141 MMOL/L (ref 136–145)
TRIGL SERPL-MCNC: 131 MG/DL
TSH SERPL DL<=0.05 MIU/L-ACNC: 3.85 UIU/ML (ref 0.36–3.74)
WBC # BLD AUTO: 5.65 THOUSAND/UL (ref 4.31–10.16)

## 2020-09-29 PROCEDURE — 85027 COMPLETE CBC AUTOMATED: CPT

## 2020-09-29 PROCEDURE — 83036 HEMOGLOBIN GLYCOSYLATED A1C: CPT

## 2020-09-29 PROCEDURE — 80061 LIPID PANEL: CPT

## 2020-09-29 PROCEDURE — 84443 ASSAY THYROID STIM HORMONE: CPT

## 2020-09-29 PROCEDURE — G0103 PSA SCREENING: HCPCS

## 2020-09-29 PROCEDURE — 36415 COLL VENOUS BLD VENIPUNCTURE: CPT

## 2020-09-29 PROCEDURE — 80053 COMPREHEN METABOLIC PANEL: CPT

## 2020-10-06 ENCOUNTER — OFFICE VISIT (OUTPATIENT)
Dept: FAMILY MEDICINE CLINIC | Facility: HOSPITAL | Age: 70
End: 2020-10-06
Payer: MEDICARE

## 2020-10-06 VITALS
DIASTOLIC BLOOD PRESSURE: 78 MMHG | WEIGHT: 184 LBS | SYSTOLIC BLOOD PRESSURE: 122 MMHG | BODY MASS INDEX: 28.88 KG/M2 | HEART RATE: 81 BPM | HEIGHT: 67 IN | TEMPERATURE: 97.1 F

## 2020-10-06 DIAGNOSIS — M19.90 ARTHRITIS: ICD-10-CM

## 2020-10-06 DIAGNOSIS — E78.00 PURE HYPERCHOLESTEROLEMIA: ICD-10-CM

## 2020-10-06 DIAGNOSIS — E03.9 ACQUIRED HYPOTHYROIDISM: ICD-10-CM

## 2020-10-06 DIAGNOSIS — R73.01 IFG (IMPAIRED FASTING GLUCOSE): ICD-10-CM

## 2020-10-06 DIAGNOSIS — I10 ESSENTIAL HYPERTENSION: ICD-10-CM

## 2020-10-06 DIAGNOSIS — Z23 NEED FOR VACCINATION: Primary | ICD-10-CM

## 2020-10-06 PROCEDURE — 1123F ACP DISCUSS/DSCN MKR DOCD: CPT | Performed by: INTERNAL MEDICINE

## 2020-10-06 PROCEDURE — G0008 ADMIN INFLUENZA VIRUS VAC: HCPCS

## 2020-10-06 PROCEDURE — G0439 PPPS, SUBSEQ VISIT: HCPCS | Performed by: INTERNAL MEDICINE

## 2020-10-06 PROCEDURE — 99214 OFFICE O/P EST MOD 30 MIN: CPT | Performed by: INTERNAL MEDICINE

## 2020-10-06 PROCEDURE — 90662 IIV NO PRSV INCREASED AG IM: CPT

## 2020-10-06 RX ORDER — MULTIVIT WITH MINERALS/LUTEIN
1000 TABLET ORAL DAILY
COMMUNITY

## 2020-10-06 RX ORDER — LEVOTHYROXINE SODIUM 112 UG/1
112 TABLET ORAL DAILY
Qty: 90 TABLET | Refills: 3 | Status: SHIPPED | OUTPATIENT
Start: 2020-10-06 | End: 2021-07-12 | Stop reason: SDUPTHER

## 2021-03-09 DIAGNOSIS — Z23 ENCOUNTER FOR IMMUNIZATION: ICD-10-CM

## 2021-05-20 ENCOUNTER — LAB (OUTPATIENT)
Dept: LAB | Facility: HOSPITAL | Age: 71
End: 2021-05-20
Attending: INTERNAL MEDICINE
Payer: MEDICARE

## 2021-05-20 DIAGNOSIS — E03.9 ACQUIRED HYPOTHYROIDISM: ICD-10-CM

## 2021-05-20 LAB — TSH SERPL DL<=0.05 MIU/L-ACNC: 2.9 UIU/ML (ref 0.36–3.74)

## 2021-05-20 PROCEDURE — 36415 COLL VENOUS BLD VENIPUNCTURE: CPT

## 2021-05-20 PROCEDURE — 84443 ASSAY THYROID STIM HORMONE: CPT

## 2021-05-25 ENCOUNTER — OFFICE VISIT (OUTPATIENT)
Dept: FAMILY MEDICINE CLINIC | Facility: HOSPITAL | Age: 71
End: 2021-05-25
Payer: MEDICARE

## 2021-05-25 VITALS
SYSTOLIC BLOOD PRESSURE: 120 MMHG | WEIGHT: 190.8 LBS | HEIGHT: 67 IN | BODY MASS INDEX: 29.95 KG/M2 | HEART RATE: 78 BPM | OXYGEN SATURATION: 99 % | DIASTOLIC BLOOD PRESSURE: 78 MMHG

## 2021-05-25 DIAGNOSIS — E03.9 ACQUIRED HYPOTHYROIDISM: ICD-10-CM

## 2021-05-25 DIAGNOSIS — R73.01 IFG (IMPAIRED FASTING GLUCOSE): ICD-10-CM

## 2021-05-25 DIAGNOSIS — E78.00 PURE HYPERCHOLESTEROLEMIA: Primary | ICD-10-CM

## 2021-05-25 DIAGNOSIS — Z12.5 ENCOUNTER FOR PROSTATE CANCER SCREENING: ICD-10-CM

## 2021-05-25 DIAGNOSIS — I10 ESSENTIAL HYPERTENSION: ICD-10-CM

## 2021-05-25 PROCEDURE — 99214 OFFICE O/P EST MOD 30 MIN: CPT | Performed by: INTERNAL MEDICINE

## 2021-07-12 DIAGNOSIS — E03.9 ACQUIRED HYPOTHYROIDISM: ICD-10-CM

## 2021-07-13 RX ORDER — LEVOTHYROXINE SODIUM 112 UG/1
112 TABLET ORAL DAILY
Qty: 90 TABLET | Refills: 3 | Status: SHIPPED | OUTPATIENT
Start: 2021-07-13 | End: 2021-10-20 | Stop reason: SDUPTHER

## 2021-09-14 DIAGNOSIS — E03.9 ACQUIRED HYPOTHYROIDISM: ICD-10-CM

## 2021-09-15 RX ORDER — TRAZODONE HYDROCHLORIDE 150 MG/1
75 TABLET ORAL
Qty: 45 TABLET | Refills: 0 | Status: SHIPPED | OUTPATIENT
Start: 2021-09-15 | End: 2021-12-13 | Stop reason: SDUPTHER

## 2021-10-20 DIAGNOSIS — E03.9 ACQUIRED HYPOTHYROIDISM: ICD-10-CM

## 2021-10-20 DIAGNOSIS — I10 ESSENTIAL HYPERTENSION: ICD-10-CM

## 2021-10-20 RX ORDER — LEVOTHYROXINE SODIUM 112 UG/1
112 TABLET ORAL DAILY
Qty: 90 TABLET | Refills: 3 | Status: SHIPPED | OUTPATIENT
Start: 2021-10-20

## 2021-10-20 RX ORDER — LOSARTAN POTASSIUM 100 MG/1
100 TABLET ORAL DAILY
Qty: 90 TABLET | Refills: 3 | Status: SHIPPED | OUTPATIENT
Start: 2021-10-20

## 2021-10-20 RX ORDER — SIMVASTATIN 20 MG
20 TABLET ORAL
Qty: 90 TABLET | Refills: 3 | Status: SHIPPED | OUTPATIENT
Start: 2021-10-20

## 2021-12-13 ENCOUNTER — RA CDI HCC (OUTPATIENT)
Dept: OTHER | Facility: HOSPITAL | Age: 71
End: 2021-12-13

## 2021-12-13 DIAGNOSIS — E03.9 ACQUIRED HYPOTHYROIDISM: ICD-10-CM

## 2021-12-13 RX ORDER — TRAZODONE HYDROCHLORIDE 150 MG/1
75 TABLET ORAL
Qty: 45 TABLET | Refills: 0 | Status: SHIPPED | OUTPATIENT
Start: 2021-12-13 | End: 2022-03-09 | Stop reason: SDUPTHER

## 2021-12-16 ENCOUNTER — LAB (OUTPATIENT)
Dept: LAB | Facility: HOSPITAL | Age: 71
End: 2021-12-16
Attending: INTERNAL MEDICINE
Payer: MEDICARE

## 2021-12-16 DIAGNOSIS — Z12.5 ENCOUNTER FOR PROSTATE CANCER SCREENING: ICD-10-CM

## 2021-12-16 DIAGNOSIS — I10 ESSENTIAL HYPERTENSION: ICD-10-CM

## 2021-12-16 DIAGNOSIS — E78.00 PURE HYPERCHOLESTEROLEMIA: ICD-10-CM

## 2021-12-16 DIAGNOSIS — E03.9 ACQUIRED HYPOTHYROIDISM: ICD-10-CM

## 2021-12-16 LAB
ALBUMIN SERPL BCP-MCNC: 4.2 G/DL (ref 3.5–5)
ALP SERPL-CCNC: 82 U/L (ref 46–116)
ALT SERPL W P-5'-P-CCNC: 21 U/L (ref 12–78)
ANION GAP SERPL CALCULATED.3IONS-SCNC: 3 MMOL/L (ref 4–13)
AST SERPL W P-5'-P-CCNC: 16 U/L (ref 5–45)
BILIRUB SERPL-MCNC: 0.6 MG/DL (ref 0.2–1)
BUN SERPL-MCNC: 21 MG/DL (ref 5–25)
CALCIUM SERPL-MCNC: 9 MG/DL (ref 8.3–10.1)
CHLORIDE SERPL-SCNC: 109 MMOL/L (ref 100–108)
CHOLEST SERPL-MCNC: 185 MG/DL
CO2 SERPL-SCNC: 27 MMOL/L (ref 21–32)
CREAT SERPL-MCNC: 1.07 MG/DL (ref 0.6–1.3)
ERYTHROCYTE [DISTWIDTH] IN BLOOD BY AUTOMATED COUNT: 12.9 % (ref 11.6–15.1)
GFR SERPL CREATININE-BSD FRML MDRD: 68 ML/MIN/1.73SQ M
GLUCOSE P FAST SERPL-MCNC: 124 MG/DL (ref 65–99)
HCT VFR BLD AUTO: 39.7 % (ref 36.5–49.3)
HDLC SERPL-MCNC: 60 MG/DL
HGB BLD-MCNC: 13 G/DL (ref 12–17)
LDLC SERPL CALC-MCNC: 104 MG/DL (ref 0–100)
MCH RBC QN AUTO: 31.6 PG (ref 26.8–34.3)
MCHC RBC AUTO-ENTMCNC: 32.7 G/DL (ref 31.4–37.4)
MCV RBC AUTO: 96 FL (ref 82–98)
PLATELET # BLD AUTO: 241 THOUSANDS/UL (ref 149–390)
PMV BLD AUTO: 9.9 FL (ref 8.9–12.7)
POTASSIUM SERPL-SCNC: 4.3 MMOL/L (ref 3.5–5.3)
PROT SERPL-MCNC: 7.1 G/DL (ref 6.4–8.2)
PSA SERPL-MCNC: 1.2 NG/ML (ref 0–4)
RBC # BLD AUTO: 4.12 MILLION/UL (ref 3.88–5.62)
SODIUM SERPL-SCNC: 139 MMOL/L (ref 136–145)
TRIGL SERPL-MCNC: 107 MG/DL
TSH SERPL DL<=0.05 MIU/L-ACNC: 3.06 UIU/ML (ref 0.36–3.74)
WBC # BLD AUTO: 5.63 THOUSAND/UL (ref 4.31–10.16)

## 2021-12-16 PROCEDURE — 80061 LIPID PANEL: CPT

## 2021-12-16 PROCEDURE — G0103 PSA SCREENING: HCPCS

## 2021-12-16 PROCEDURE — 36415 COLL VENOUS BLD VENIPUNCTURE: CPT

## 2021-12-16 PROCEDURE — 85027 COMPLETE CBC AUTOMATED: CPT

## 2021-12-16 PROCEDURE — 80053 COMPREHEN METABOLIC PANEL: CPT

## 2021-12-16 PROCEDURE — 84443 ASSAY THYROID STIM HORMONE: CPT

## 2021-12-30 ENCOUNTER — OFFICE VISIT (OUTPATIENT)
Dept: FAMILY MEDICINE CLINIC | Facility: HOSPITAL | Age: 71
End: 2021-12-30
Payer: MEDICARE

## 2021-12-30 VITALS
SYSTOLIC BLOOD PRESSURE: 118 MMHG | WEIGHT: 189 LBS | OXYGEN SATURATION: 96 % | DIASTOLIC BLOOD PRESSURE: 68 MMHG | HEART RATE: 80 BPM | HEIGHT: 68 IN | RESPIRATION RATE: 16 BRPM | BODY MASS INDEX: 28.64 KG/M2

## 2021-12-30 DIAGNOSIS — E03.9 ACQUIRED HYPOTHYROIDISM: ICD-10-CM

## 2021-12-30 DIAGNOSIS — E78.00 PURE HYPERCHOLESTEROLEMIA: Primary | ICD-10-CM

## 2021-12-30 DIAGNOSIS — I10 ESSENTIAL HYPERTENSION: ICD-10-CM

## 2021-12-30 DIAGNOSIS — J30.1 SEASONAL ALLERGIC RHINITIS DUE TO POLLEN: ICD-10-CM

## 2021-12-30 PROBLEM — Z00.00 MEDICARE ANNUAL WELLNESS VISIT, SUBSEQUENT: Status: RESOLVED | Noted: 2021-12-30 | Resolved: 2021-12-30

## 2021-12-30 PROBLEM — Z00.00 MEDICARE ANNUAL WELLNESS VISIT, SUBSEQUENT: Status: ACTIVE | Noted: 2021-12-30

## 2021-12-30 PROBLEM — J30.2 SEASONAL ALLERGIC RHINITIS: Status: ACTIVE | Noted: 2021-12-30

## 2021-12-30 PROCEDURE — G0439 PPPS, SUBSEQ VISIT: HCPCS | Performed by: INTERNAL MEDICINE

## 2021-12-30 PROCEDURE — 99213 OFFICE O/P EST LOW 20 MIN: CPT | Performed by: INTERNAL MEDICINE

## 2021-12-30 RX ORDER — FLUTICASONE PROPIONATE 50 MCG
1 SPRAY, SUSPENSION (ML) NASAL DAILY
Qty: 18.2 ML | Refills: 3 | Status: SHIPPED | OUTPATIENT
Start: 2021-12-30 | End: 2022-06-09

## 2022-05-26 DIAGNOSIS — R73.01 IFG (IMPAIRED FASTING GLUCOSE): Primary | ICD-10-CM

## 2022-05-26 DIAGNOSIS — E03.9 ACQUIRED HYPOTHYROIDISM: ICD-10-CM

## 2022-05-26 RX ORDER — TRAZODONE HYDROCHLORIDE 150 MG/1
75 TABLET ORAL
Qty: 45 TABLET | Refills: 0 | Status: SHIPPED | OUTPATIENT
Start: 2022-05-26 | End: 2022-07-29

## 2022-06-09 ENCOUNTER — TELEPHONE (OUTPATIENT)
Dept: FAMILY MEDICINE CLINIC | Facility: HOSPITAL | Age: 72
End: 2022-06-09

## 2022-06-09 ENCOUNTER — TELEMEDICINE (OUTPATIENT)
Dept: FAMILY MEDICINE CLINIC | Facility: HOSPITAL | Age: 72
End: 2022-06-09
Payer: MEDICARE

## 2022-06-09 VITALS
OXYGEN SATURATION: 96 % | WEIGHT: 185 LBS | BODY MASS INDEX: 28.55 KG/M2 | HEART RATE: 73 BPM | SYSTOLIC BLOOD PRESSURE: 119 MMHG | DIASTOLIC BLOOD PRESSURE: 60 MMHG

## 2022-06-09 DIAGNOSIS — U07.1 COVID-19: Primary | ICD-10-CM

## 2022-06-09 DIAGNOSIS — J06.9 URI WITH COUGH AND CONGESTION: ICD-10-CM

## 2022-06-09 PROBLEM — K21.9 GASTROESOPHAGEAL REFLUX DISEASE WITHOUT ESOPHAGITIS: Status: ACTIVE | Noted: 2022-02-22

## 2022-06-09 PROCEDURE — 99213 OFFICE O/P EST LOW 20 MIN: CPT | Performed by: STUDENT IN AN ORGANIZED HEALTH CARE EDUCATION/TRAINING PROGRAM

## 2022-06-09 RX ORDER — GUAIFENESIN AND CODEINE PHOSPHATE 100; 10 MG/5ML; MG/5ML
5 SOLUTION ORAL 3 TIMES DAILY PRN
Qty: 180 ML | Refills: 0 | Status: SHIPPED | OUTPATIENT
Start: 2022-06-09 | End: 2022-06-30

## 2022-06-09 RX ORDER — GUAIFENESIN AND CODEINE PHOSPHATE 100; 10 MG/5ML; MG/5ML
5 SOLUTION ORAL 3 TIMES DAILY PRN
Qty: 180 ML | Refills: 0 | Status: CANCELLED | OUTPATIENT
Start: 2022-06-09

## 2022-06-09 RX ORDER — BENZONATATE 100 MG/1
100 CAPSULE ORAL 3 TIMES DAILY PRN
Qty: 20 CAPSULE | Refills: 0 | Status: SHIPPED | OUTPATIENT
Start: 2022-06-09 | End: 2022-06-30

## 2022-06-09 RX ORDER — OMEPRAZOLE 20 MG/1
20 CAPSULE, DELAYED RELEASE ORAL DAILY
COMMUNITY
Start: 2022-05-26

## 2022-06-09 RX ORDER — IPRATROPIUM BROMIDE 42 UG/1
SPRAY, METERED NASAL
COMMUNITY
Start: 2022-05-26 | End: 2022-06-30

## 2022-06-09 NOTE — TELEPHONE ENCOUNTER
Pt had virtual appt and states there was supposed to be a cough med liquid called in along with other 2 meds  Only had two ready in pharmacy   PCB

## 2022-06-09 NOTE — PROGRESS NOTES
COVID-19 Outpatient Progress Note  Assessment/Plan:   Diagnosis ICD-10-CM Associated Orders   1  COVID-19  U07 1 nirmatrelvir & ritonavir (Paxlovid) tablet therapy pack     benzonatate (TESSALON PERLES) 100 mg capsule     guaifenesin-codeine (GUAIFENESIN AC) 100-10 MG/5ML liquid   2  URI with cough and congestion  J06 9 guaifenesin-codeine (GUAIFENESIN AC) 100-10 MG/5ML liquid      Reviewed S/S & Paxlovid agreed to & ordered  Also ordered Tessalon Perles and cough medicine  Patient can call with any questions or concerns  Reviewed quarantined precautions  Hina Novoa is an investigational medicine used to treat mild-to-moderate COVID-19 in adults and children [15years of age and older weighing at least 80 pounds (36 kg)] with positive results of direct SARS-CoV-2 viral testing, and who are at high risk for progression to severe COVID-19, including hospitalization or death  PAXLOVID is investigational because it is still being studied  There is limited information about the safety and effectiveness of using PAXLOVID to treat people with mild-to-moderate COVID-19  The FDA has authorized the emergency use of PAXLOVID for the treatment of mild-tomoderate COVID-19 in adults and children [15years of age and older weighing at least 80 pounds (36 kg)] with a positive test for the virus that causes COVID-19, and who are at high risk for progression to severe COVID-19, including hospitalization or death, under an EUA  What should I tell my healthcare provider before I take PAXLOVID? Tell your healthcare provider if you:   Have any allergies   Have liver or kidney disease   Are pregnant or plan to become pregnant   Are breastfeeding a child   ave any serious illnesses    Tell your healthcare provider about all the medicines you take, including prescription and over-the-counter medicines, vitamins, and herbal supplements  Some medicines may interact with PAXLOVID and may cause serious side effects   Keep a list of your medicines to show your healthcare provider and pharmacist when you get a new medicine  You can ask your healthcare provider or pharmacist for a list of medicines that interact with PAXLOVID  Do not start taking a new medicine without telling your healthcare provider  Your healthcare provider can tell you if it is safe to take PAXLOVID with other medicines  Tell your healthcare provider if you are taking combined hormonal contraceptive  PAXLOVID may affect how your birth control pills work  Females who are able to become pregnant should use another effective alternative form of contraception or an additional barrier method of contraception  Talk to your healthcare provider if you have any questions about contraceptive methods that might be right for you  How do I take [address] consists of 2 medicines: nirmatrelvir and ritonavir  o Take 2 pink tablets of nirmatrelvir with 1 white tablet of ritonavir by mouth 2 times each day (in the morning and in the evening) for 5 days  For each dose, take all 3 tablets at the same time  o If you have kidney disease, talk to your healthcare provider  You may need a different dose   Swallow the tablets whole  Do not chew, break, or crush the tablets   Take PAXLOVID with or without food   Do not stop taking PAXLOVID without talking to your healthcare provider, even if you feel better   If you miss a dose of PAXLOVID within 8 hours of the time it is usually taken, take it as soon as you remember  If you miss a dose by more than 8 hours, skip the missed dose and take the next dose at your regular time  Do not take 2 doses of PAXLOVID at the same time   If you take too much PAXLOVID, call your healthcare provider or go to the nearest hospital emergency room right away     If you are taking a ritonavir- or cobicistat-containing medicine to treat hepatitis C or Human Immunodeficiency Virus (HIV), you should continue to take your medicine as prescribed by your healthcare provider   Talk to your healthcare provider if you do not feel better or if you feel worse after 5 days  Who should generally not take PAXLOVID? Do not take PAXLOVID if:   You are allergic to nirmatrelvir, ritonavir, or any of the ingredients in PAXLOVID   You are taking any of the following medicines:  o Alfuzosin  o Pethidine, piroxicam, propoxyphene  o Ranolazine  o Amiodarone, dronedarone, flecainide, propafenone, quinidine  o Colchicine  o Lurasidone, pimozide, clozapine  o Dihydroergotamine, ergotamine, methylergonovine  o Lovastatin, simvastatin  o Sildenafil (Revatio®) for pulmonary arterial hypertension (PAH)  o Triazolam, oral midazolam  o Apalutamide  o Carbamazepine, phenobarbital, phenytoin  o Rifampin  o St  Shukris Wort (hypericum perforatum)    What are the important possible side effects of PAXLOVID? Possible side effects of PAXLOVID are:   Liver Problems  Tell your healthcare provider right away if you have any of these signs and symptoms of liver problems: loss of appetite, yellowing of your skin and the whites of eyes (jaundice), dark-colored urine, pale colored stools and itchy skin, stomach area (abdominal) pain   Resistance to HIV Medicines  If you have untreated HIV infection, PAXLOVID may lead to some HIV medicines not working as well in the future   Other possible side effects include: altered sense of taste, diarrhea, high blood pressure, or muscle aches    These are not all the possible side effects of PAXLOVID  Not many people have taken PAXLOVID  Serious and unexpected side effects may happen  Baljitelyse Oliver is still being studied, so it is possible that all of the risks are not known at this time  What other treatment choices are there? Like Stephanie Pert may allow for the emergency use of other medicines to treat people with COVID-19   Go to https://[employer] Web/ for information on the emergency use of other medicines that are authorized by FDA to treat people with COVID-19  Your healthcare provider may talk with you about clinical trials for which you may be eligible  It is your choice to be treated or not to be treated with PAXLOVID  Should you decide not to receive it or for your child not to receive it, it will not change your standard medical care  What if I am pregnant or breastfeeding? There is no experience treating pregnant women or breastfeeding mothers with PAXLOVID  For a mother and unborn baby, the benefit of taking PAXLOVID may be greater than the risk from the treatment  If you are pregnant, discuss your options and specific situation with your healthcare provider  It is recommended that you use effective barrier contraception or do not have sexual activity while taking PAXLOVID  If you are breastfeeding, discuss your options and specific situation with your healthcare provider  How do I report side effects with PAXLOVID? Contact your healthcare provider if you have any side effects that bother you or do not go away  Report side effects to FDA MedWatch at www fda gov/medwatch or call 0-751-DSP9577 or you can report side effects to [employer]  at the contact information provided below  Website Fax number Telephone number   [employer] [phone]     How should I store Teola Dominic? Store PAXLOVID tablets at room temperature between 68°F to 77°F (20°C to 25°C)    Full fact sheet for patients, parents, and caregivers can be found at: Boris [email or web address]    Time Spent 20 mins    Encounter provider Rica Sanchez DO    Provider located at [address] [place] [address]  Mike Grief [address] [id number]    Recent Visits  No visits were found meeting these conditions  Showing recent visits within past 7 days and meeting all other requirements  Today's Visits  Date Type Provider Dept   06/09/22 Telephone Vicente Renae MD [facility]   06/09/22 Telemedicine Solo Julien DO [facility]   Showing today's visits and meeting all other requirements  Future Appointments  No visits were found meeting these conditions  Showing future appointments within next 150 days and meeting all other requirements     This virtual check-in was done via Alexanderdilia Frankel not connecting and patient was informed that this is not a secure, HIPAA-compliant platform  He agrees to proceed  Patient agrees to participate in a virtual check in via telephone or video visit instead of presenting to the office to address urgent/immediate medical needs  Patient is aware this is a billable service  After connecting through [facility], the patient was identified by name and date of birth  Lalo Friend was informed that this was a telemedicine visit and that the exam was being conducted confidentially over secure lines  My office door was closed  No one else was in the room  Lalo Friend acknowledged consent and understanding of privacy and security of the telemedicine visit  I informed the patient that I have reviewed his record in Epic and presented the opportunity for him to ask any questions regarding the visit today  The patient agreed to participate  Verification of patient location:  Patient is located in the following state in which I hold an active license: [place]    Subjective:   Lalo Friend is a 74 y o  male who is concerned about COVID-19  Patient is currently asymptomatic  Patient's symptoms include fatigue (mild), nasal congestion (PND), rhinorrhea, cough and headache (with coughing fits)   Patient denies fever, chills, malaise, sore throat, anosmia, loss of taste, shortness of breath, chest tightness, abdominal pain, nausea, vomiting, diarrhea and myalgias  - Date of symptom onset: 6/7/2022      COVID-19 vaccination status: Fully vaccinated with booster    Exposure:   Contact with a person who is under investigation (PUI) for or who is positive for COVID-19 within the last 14 days?: No    Hospitalized recently for fever and/or lower respiratory symptoms?: No      Currently a healthcare worker that is involved in direct patient care?: No      Works in a special setting where the risk of COVID-19 transmission may be high? (this may include long-term care, correctional and half-way facilities; homeless shelters; assisted-living facilities and group homes ): No      Resident in a special setting where the risk of COVID-19 transmission may be high? (this may include long-term care, correctional and half-way facilities; homeless shelters; assisted-living facilities and group homes ): No      No known exposure  Less mask use       No results found for: Yuval Bradford, SARSCORONAVI, CORONAVIRUSR, SARSCOVAG, [address] [facility]  Past Medical History:   Diagnosis Date    Disease of thyroid gland     Hypertension     Sleep disorder      Past Surgical History:   Procedure Laterality Date    CHOLECYSTECTOMY      JOINT REPLACEMENT      KNEE SURGERY      LEG WOUND REPAIR / CLOSURE       Current Outpatient Medications   Medication Sig Dispense Refill    amoxicillin (AMOXIL) 500 mg capsule Take 500 mg by mouth Prior to dental      Ascorbic Acid (vitamin C) 1000 MG tablet Take 1,000 mg by mouth daily      benzonatate (TESSALON PERLES) 100 mg capsule Take 1 capsule (100 mg total) by mouth 3 (three) times a day as needed for cough 20 capsule 0    ELDERBERRY PO Take 1 tablet by mouth daily      guaifenesin-codeine (GUAIFENESIN AC) 100-10 MG/5ML liquid Take 5 mL by mouth 3 (three) times a day as needed for cough 180 mL 0    ipratropium (ATROVENT) 0 06 % nasal spray USE 2 SPRAY(S) IN EACH NOSTRIL 4 TIMES DAILY AS NEEDED FOR NASAL DRAINAGE      levothyroxine 112 mcg tablet Take 1 tablet (112 mcg total) by mouth daily 90 tablet 3    losartan (COZAAR) 100 MG tablet Take 1 tablet (100 mg total) by mouth daily 90 tablet 3    nirmatrelvir & ritonavir (Paxlovid) tablet therapy pack Take 3 tablets by mouth 2 (two) times a day for 5 days Take 2 nirmatrelvir tablets + 1 ritonavir tablet together per dose 30 tablet 0    omeprazole (PriLOSEC) 20 mg delayed release capsule Take 20 mg by mouth daily      simvastatin (ZOCOR) 20 mg tablet Take 1 tablet (20 mg total) by mouth daily at bedtime 90 tablet 3    traZODone (DESYREL) 150 mg tablet Take 0 5 tablets (75 mg total) by mouth daily at bedtime 45 tablet 0     No current facility-administered medications for this visit  No Known Allergies    Review of Systems   Constitutional: Positive for fatigue (mild)  Negative for chills and fever  HENT: Positive for congestion (PND) and rhinorrhea  Negative for sore throat  Respiratory: Positive for cough  Negative for chest tightness and shortness of breath  Gastrointestinal: Negative for abdominal pain, diarrhea, nausea and vomiting  Musculoskeletal: Negative for myalgias  Neurological: Positive for headaches (with coughing fits)  Objective:    Vitals:    06/09/22 1114   BP: 119/60   Pulse: 73   SpO2: 96%   Weight: 83 9 kg (185 lb)     Physical Exam    VIRTUAL VISIT DISCLAIMER    Olga Armenta verbally agrees to participate in [employer]  Pt is aware that Virtual Care Services could be limited without vital signs or the ability to perform a full hands-on physical Krystyna Boss understands he or the provider may request at any time to terminate the video visit and request the patient to seek care or treatment in person

## 2022-06-27 ENCOUNTER — LAB (OUTPATIENT)
Dept: LAB | Facility: HOSPITAL | Age: 72
End: 2022-06-27
Attending: INTERNAL MEDICINE
Payer: MEDICARE

## 2022-06-27 DIAGNOSIS — R73.01 IFG (IMPAIRED FASTING GLUCOSE): ICD-10-CM

## 2022-06-27 LAB
EST. AVERAGE GLUCOSE BLD GHB EST-MCNC: 123 MG/DL
HBA1C MFR BLD: 5.9 %

## 2022-06-27 PROCEDURE — 36415 COLL VENOUS BLD VENIPUNCTURE: CPT

## 2022-06-27 PROCEDURE — 83036 HEMOGLOBIN GLYCOSYLATED A1C: CPT

## 2022-06-30 ENCOUNTER — OFFICE VISIT (OUTPATIENT)
Dept: FAMILY MEDICINE CLINIC | Facility: HOSPITAL | Age: 72
End: 2022-06-30
Payer: MEDICARE

## 2022-06-30 VITALS
HEART RATE: 65 BPM | OXYGEN SATURATION: 96 % | HEIGHT: 68 IN | BODY MASS INDEX: 28.52 KG/M2 | WEIGHT: 188.2 LBS | SYSTOLIC BLOOD PRESSURE: 122 MMHG | DIASTOLIC BLOOD PRESSURE: 60 MMHG

## 2022-06-30 DIAGNOSIS — K21.9 GASTROESOPHAGEAL REFLUX DISEASE WITHOUT ESOPHAGITIS: ICD-10-CM

## 2022-06-30 DIAGNOSIS — E78.00 PURE HYPERCHOLESTEROLEMIA: ICD-10-CM

## 2022-06-30 DIAGNOSIS — Z12.5 ENCOUNTER FOR PROSTATE CANCER SCREENING: ICD-10-CM

## 2022-06-30 DIAGNOSIS — I10 ESSENTIAL HYPERTENSION: ICD-10-CM

## 2022-06-30 DIAGNOSIS — E03.9 ACQUIRED HYPOTHYROIDISM: ICD-10-CM

## 2022-06-30 DIAGNOSIS — R73.01 IFG (IMPAIRED FASTING GLUCOSE): Primary | ICD-10-CM

## 2022-06-30 PROCEDURE — 99214 OFFICE O/P EST MOD 30 MIN: CPT | Performed by: INTERNAL MEDICINE

## 2022-06-30 NOTE — PROGRESS NOTES
Assessment/Plan:       Diagnoses and all orders for this visit:    IFG (impaired fasting glucose)  -     Hemoglobin A1C; Future    Pure hypercholesterolemia  -     Lipid Panel with Direct LDL reflex; Future    Essential hypertension  -     CBC; Future  -     Comprehensive metabolic panel; Future    Acquired hypothyroidism  -     TSH, 3rd generation; Future    Encounter for prostate cancer screening  -     PSA, Total Screen; Future    Gastroesophageal reflux disease without esophagitis          All of the above diagnoses have been assessed  Additional COMMENTS/PLAN: [facility] wellness in 6 months      Subjective:      Patient ID: Jonah Rangel is a 74 y o  male  HPI     IFG-has been doing well  Hypertension  Patient is here for follow-up of elevated blood pressure  Pt is compliant with medications and salt restriction  Denies lightheadedness  Monitors BP - yes    Hyperlipidemia- The patient is compliant with diet and taking meds  The patient understands the efficacy of the treatment in vascular prevention  The following portions of the patient's history were revised and updated as appropriate: Problem list, allergies, med list, FH, SH, Past medical and surgical histories      Current Outpatient Medications   Medication Sig Dispense Refill    amoxicillin (AMOXIL) 500 mg capsule Take 500 mg by mouth Prior to dental      Ascorbic Acid (vitamin C) 1000 MG tablet Take 1,000 mg by mouth daily      ELDERBERRY PO Take 1 tablet by mouth daily      levothyroxine 112 mcg tablet Take 1 tablet (112 mcg total) by mouth daily 90 tablet 3    losartan (COZAAR) 100 MG tablet Take 1 tablet (100 mg total) by mouth daily 90 tablet 3    omeprazole (PriLOSEC) 20 mg delayed release capsule Take 20 mg by mouth daily      simvastatin (ZOCOR) 20 mg tablet Take 1 tablet (20 mg total) by mouth daily at bedtime 90 tablet 3    traZODone (DESYREL) 150 mg tablet Take 0 5 tablets (75 mg total) by mouth daily at bedtime 45 tablet 0 No current facility-administered medications for this visit  Review of Systems   Gastrointestinal:        Patient in [place] had an evaluation for cough or wheezing which she was prescribed a PPI  This got better  He however has had no imaging studies  Will actually do that   All other systems reviewed and are negative  Objective:    /60   Pulse 65   Ht 5' 7 5" (1 715 m)   Wt 85 4 kg (188 lb 3 2 oz)   SpO2 96%   BMI 29 04 kg/m²     BP Readings from Last 3 Encounters:   06/30/22 122/60   06/09/22 119/60   12/30/21 118/68                  Wt Readings from Last 3 Encounters:   06/30/22 85 4 kg (188 lb 3 2 oz)   06/09/22 83 9 kg (185 lb)   12/30/21 85 7 kg (189 lb)         Physical Exam  Vitals reviewed  Constitutional:       Appearance: Normal appearance  Cardiovascular:      Rate and Rhythm: Normal rate and regular rhythm  Pulmonary:      Effort: Pulmonary effort is normal       Breath sounds: Normal breath sounds  Abdominal:      General: Abdomen is flat  Bowel sounds are normal       Palpations: Abdomen is soft  Musculoskeletal:      Right lower leg: No edema  Left lower leg: No edema  Neurological:      General: No focal deficit present  Mental Status: He is alert and oriented to person, place, and time  Lab on 06/27/2022   Component Date Value Ref Range Status    Hemoglobin A1C 06/27/2022 5 9 (A) Normal 3 8-5 6%; PreDiabetic 5 7-6 4%; Diabetic >=6 5%; Glycemic control for adults with diabetes <7 0% % Final    EAG 06/27/2022 123  mg/dl Final         Franny Mora MD    Some or all of this note was generated with a voice recognition dictation system and therefore my contain grammatical or spelling errors

## 2022-07-20 ENCOUNTER — HOSPITAL ENCOUNTER (OUTPATIENT)
Dept: RADIOLOGY | Facility: HOSPITAL | Age: 72
Discharge: HOME | End: 2022-07-20
Attending: INTERNAL MEDICINE
Payer: MEDICARE

## 2022-07-20 DIAGNOSIS — K21.9 GASTROESOPHAGEAL REFLUX DISEASE WITHOUT ESOPHAGITIS: ICD-10-CM

## 2022-07-20 PROCEDURE — 74246 X-RAY XM UPR GI TRC 2CNTRST: CPT

## 2022-07-29 DIAGNOSIS — E03.9 ACQUIRED HYPOTHYROIDISM: ICD-10-CM

## 2022-07-29 RX ORDER — TRAZODONE HYDROCHLORIDE 150 MG/1
TABLET ORAL
Qty: 45 TABLET | Refills: 0 | Status: SHIPPED | OUTPATIENT
Start: 2022-07-29 | End: 2022-08-26

## 2022-08-26 DIAGNOSIS — I10 ESSENTIAL HYPERTENSION: ICD-10-CM

## 2022-08-26 DIAGNOSIS — E03.9 ACQUIRED HYPOTHYROIDISM: ICD-10-CM

## 2022-08-26 RX ORDER — LOSARTAN POTASSIUM 100 MG/1
TABLET ORAL
Qty: 90 TABLET | Refills: 0 | Status: SHIPPED | OUTPATIENT
Start: 2022-08-26

## 2022-08-26 RX ORDER — TRAZODONE HYDROCHLORIDE 150 MG/1
TABLET ORAL
Qty: 45 TABLET | Refills: 0 | Status: SHIPPED | OUTPATIENT
Start: 2022-08-26

## 2022-10-25 DIAGNOSIS — K21.9 GASTROESOPHAGEAL REFLUX DISEASE WITHOUT ESOPHAGITIS: Primary | ICD-10-CM

## 2022-10-25 DIAGNOSIS — E03.9 ACQUIRED HYPOTHYROIDISM: ICD-10-CM

## 2022-10-26 RX ORDER — SIMVASTATIN 20 MG
20 TABLET ORAL
Qty: 90 TABLET | Refills: 1 | Status: SHIPPED | OUTPATIENT
Start: 2022-10-26

## 2022-10-26 RX ORDER — OMEPRAZOLE 20 MG/1
20 CAPSULE, DELAYED RELEASE ORAL DAILY
Qty: 90 CAPSULE | Refills: 1 | Status: SHIPPED | OUTPATIENT
Start: 2022-10-26

## 2022-10-26 RX ORDER — LEVOTHYROXINE SODIUM 112 UG/1
112 TABLET ORAL DAILY
Qty: 90 TABLET | Refills: 1 | Status: SHIPPED | OUTPATIENT
Start: 2022-10-26

## 2022-12-06 DIAGNOSIS — I10 ESSENTIAL HYPERTENSION: ICD-10-CM

## 2022-12-06 DIAGNOSIS — E03.9 ACQUIRED HYPOTHYROIDISM: ICD-10-CM

## 2022-12-06 RX ORDER — LOSARTAN POTASSIUM 100 MG/1
100 TABLET ORAL DAILY
Qty: 90 TABLET | Refills: 0 | Status: SHIPPED | OUTPATIENT
Start: 2022-12-06

## 2022-12-06 RX ORDER — TRAZODONE HYDROCHLORIDE 150 MG/1
150 TABLET ORAL
Qty: 45 TABLET | Refills: 0 | Status: SHIPPED | OUTPATIENT
Start: 2022-12-06

## 2022-12-21 ENCOUNTER — APPOINTMENT (OUTPATIENT)
Dept: LAB | Facility: HOSPITAL | Age: 72
End: 2022-12-21

## 2022-12-21 DIAGNOSIS — Z12.5 ENCOUNTER FOR PROSTATE CANCER SCREENING: ICD-10-CM

## 2022-12-21 DIAGNOSIS — R73.01 IFG (IMPAIRED FASTING GLUCOSE): ICD-10-CM

## 2022-12-21 DIAGNOSIS — I10 ESSENTIAL HYPERTENSION: ICD-10-CM

## 2022-12-21 DIAGNOSIS — E78.00 PURE HYPERCHOLESTEROLEMIA: ICD-10-CM

## 2022-12-21 DIAGNOSIS — E03.9 ACQUIRED HYPOTHYROIDISM: ICD-10-CM

## 2022-12-21 LAB
ALBUMIN SERPL BCP-MCNC: 4 G/DL (ref 3.5–5)
ALP SERPL-CCNC: 80 U/L (ref 46–116)
ALT SERPL W P-5'-P-CCNC: 24 U/L (ref 12–78)
ANION GAP SERPL CALCULATED.3IONS-SCNC: 8 MMOL/L (ref 4–13)
AST SERPL W P-5'-P-CCNC: 15 U/L (ref 5–45)
BILIRUB SERPL-MCNC: 0.3 MG/DL (ref 0.2–1)
BUN SERPL-MCNC: 18 MG/DL (ref 5–25)
CALCIUM SERPL-MCNC: 9.1 MG/DL (ref 8.3–10.1)
CHLORIDE SERPL-SCNC: 107 MMOL/L (ref 96–108)
CHOLEST SERPL-MCNC: 159 MG/DL
CO2 SERPL-SCNC: 25 MMOL/L (ref 21–32)
CREAT SERPL-MCNC: 1.14 MG/DL (ref 0.6–1.3)
ERYTHROCYTE [DISTWIDTH] IN BLOOD BY AUTOMATED COUNT: 13.1 % (ref 11.6–15.1)
EST. AVERAGE GLUCOSE BLD GHB EST-MCNC: 120 MG/DL
GFR SERPL CREATININE-BSD FRML MDRD: 63 ML/MIN/1.73SQ M
GLUCOSE P FAST SERPL-MCNC: 113 MG/DL (ref 65–99)
HBA1C MFR BLD: 5.8 %
HCT VFR BLD AUTO: 34.3 % (ref 36.5–49.3)
HDLC SERPL-MCNC: 45 MG/DL
HGB BLD-MCNC: 11.2 G/DL (ref 12–17)
LDLC SERPL CALC-MCNC: 93 MG/DL (ref 0–100)
MCH RBC QN AUTO: 31 PG (ref 26.8–34.3)
MCHC RBC AUTO-ENTMCNC: 32.7 G/DL (ref 31.4–37.4)
MCV RBC AUTO: 95 FL (ref 82–98)
PLATELET # BLD AUTO: 277 THOUSANDS/UL (ref 149–390)
PMV BLD AUTO: 9.4 FL (ref 8.9–12.7)
POTASSIUM SERPL-SCNC: 4.3 MMOL/L (ref 3.5–5.3)
PROT SERPL-MCNC: 6.9 G/DL (ref 6.4–8.4)
PSA SERPL-MCNC: 1.1 NG/ML (ref 0–4)
RBC # BLD AUTO: 3.61 MILLION/UL (ref 3.88–5.62)
SODIUM SERPL-SCNC: 140 MMOL/L (ref 135–147)
TRIGL SERPL-MCNC: 104 MG/DL
TSH SERPL DL<=0.05 MIU/L-ACNC: 5.34 UIU/ML (ref 0.45–4.5)
WBC # BLD AUTO: 6.16 THOUSAND/UL (ref 4.31–10.16)

## 2022-12-27 ENCOUNTER — OFFICE VISIT (OUTPATIENT)
Dept: FAMILY MEDICINE CLINIC | Facility: HOSPITAL | Age: 72
End: 2022-12-27

## 2022-12-27 ENCOUNTER — LAB (OUTPATIENT)
Dept: LAB | Facility: HOSPITAL | Age: 72
End: 2022-12-27
Attending: INTERNAL MEDICINE

## 2022-12-27 VITALS
BODY MASS INDEX: 29.19 KG/M2 | SYSTOLIC BLOOD PRESSURE: 128 MMHG | HEIGHT: 68 IN | HEART RATE: 72 BPM | WEIGHT: 192.6 LBS | OXYGEN SATURATION: 98 % | DIASTOLIC BLOOD PRESSURE: 72 MMHG

## 2022-12-27 DIAGNOSIS — D64.9 ANEMIA, UNSPECIFIED TYPE: ICD-10-CM

## 2022-12-27 DIAGNOSIS — I10 ESSENTIAL HYPERTENSION: Primary | ICD-10-CM

## 2022-12-27 DIAGNOSIS — E03.9 ACQUIRED HYPOTHYROIDISM: ICD-10-CM

## 2022-12-27 DIAGNOSIS — R73.01 IFG (IMPAIRED FASTING GLUCOSE): ICD-10-CM

## 2022-12-27 DIAGNOSIS — E53.8 B12 DEFICIENCY: Primary | ICD-10-CM

## 2022-12-27 DIAGNOSIS — I10 ESSENTIAL HYPERTENSION: ICD-10-CM

## 2022-12-27 DIAGNOSIS — K21.9 GASTROESOPHAGEAL REFLUX DISEASE WITHOUT ESOPHAGITIS: ICD-10-CM

## 2022-12-27 DIAGNOSIS — E78.00 PURE HYPERCHOLESTEROLEMIA: ICD-10-CM

## 2022-12-27 DIAGNOSIS — E53.8 B12 DEFICIENCY: ICD-10-CM

## 2022-12-27 LAB
FERRITIN SERPL-MCNC: 210 NG/ML (ref 8–388)
FOLATE SERPL-MCNC: >20 NG/ML (ref 3.1–17.5)
IRON SATN MFR SERPL: 25 % (ref 20–50)
IRON SERPL-MCNC: 74 UG/DL (ref 65–175)
RETICS # AUTO: ABNORMAL 10*3/UL (ref 14356–105094)
RETICS # CALC: 2.69 % (ref 0.37–1.87)
TIBC SERPL-MCNC: 293 UG/DL (ref 250–450)
VIT B12 SERPL-MCNC: 268 PG/ML (ref 100–900)

## 2022-12-27 RX ORDER — LEVOTHYROXINE SODIUM 0.12 MG/1
125 TABLET ORAL
Qty: 90 TABLET | Refills: 3 | Status: SHIPPED | OUTPATIENT
Start: 2022-12-27

## 2022-12-27 RX ORDER — FAMOTIDINE 20 MG/1
20 TABLET, FILM COATED ORAL 2 TIMES DAILY
Qty: 180 TABLET | Refills: 3 | Status: SHIPPED | OUTPATIENT
Start: 2022-12-27

## 2022-12-27 RX ORDER — CHOLECALCIFEROL (VITAMIN D3) 125 MCG
1000 CAPSULE ORAL DAILY
Qty: 180 TABLET | Refills: 1 | Status: SHIPPED | OUTPATIENT
Start: 2022-12-27

## 2022-12-27 RX ORDER — TRAZODONE HYDROCHLORIDE 150 MG/1
150 TABLET ORAL
Qty: 45 TABLET | Refills: 3 | Status: SHIPPED | OUTPATIENT
Start: 2022-12-27

## 2022-12-27 RX ORDER — SIMVASTATIN 20 MG
20 TABLET ORAL
Qty: 90 TABLET | Refills: 3 | Status: SHIPPED | OUTPATIENT
Start: 2022-12-27

## 2022-12-27 RX ORDER — CHOLECALCIFEROL (VITAMIN D3) 1250 MCG
1.25 CAPSULE ORAL DAILY
COMMUNITY
Start: 2022-02-28

## 2022-12-27 RX ORDER — LOSARTAN POTASSIUM 100 MG/1
100 TABLET ORAL DAILY
Qty: 90 TABLET | Refills: 3 | Status: SHIPPED | OUTPATIENT
Start: 2022-12-27

## 2022-12-27 NOTE — PROGRESS NOTES
Assessment/Plan:    Essential hypertension  Patient is chronic hypertension  Blood pressure is controlled today  His electrolytes renal function were stable this month  He will continue losartan  I will recheck his lites with renal function in 6 months    IFG (impaired fasting glucose)  Patient is impaired fasting glucose  He exercises regularly and watches his diet  His A1c decreased  Continue diet and exercise  I will recheck his A1c in 6 months    Acquired hypothyroidism  Patient has hypothyroidism  His TSH was more than 5 this month  I increase the dose of levothyroxine to 125 mcg daily and will check TSH in 6 months    Gastroesophageal reflux disease without esophagitis  Patient has GERD that manifested as cough when laying down  Has been on omeprazole for years  I will try to switch him to Pepcid 20 mg p o  twice daily to avoid potential side effects with PPI  He will call me if cough returns on Pepcid in which case I will resume omeprazole  Pure hypercholesterolemia  Patient is hyperlipidemia  Denies myalgias  Lipids are controlled  Continue simvastatin       Diagnoses and all orders for this visit:    Essential hypertension  -     losartan (COZAAR) 100 MG tablet; Take 1 tablet (100 mg total) by mouth daily  -     Comprehensive metabolic panel; Future    Gastroesophageal reflux disease without esophagitis  -     famotidine (PEPCID) 20 mg tablet; Take 1 tablet (20 mg total) by mouth 2 (two) times a day    Pure hypercholesterolemia    Acquired hypothyroidism  -     levothyroxine (Euthyrox) 125 mcg tablet; Take 1 tablet (125 mcg total) by mouth daily in the early morning  -     simvastatin (ZOCOR) 20 mg tablet; Take 1 tablet (20 mg total) by mouth daily at bedtime  -     traZODone (DESYREL) 150 mg tablet; Take 1 tablet (150 mg total) by mouth daily at bedtime  -     TSH, 3rd generation with Free T4 reflex;  Future    Anemia, unspecified type  -     Iron Panel (Includes Ferritin, Iron Sat%, Iron, and TIBC); Future  -     Reticulocytes; Future  -     CBC and differential; Future    B12 deficiency  -     Vitamin B12; Future  -     Folate; Future    IFG (impaired fasting glucose)  -     Hemoglobin A1C; Future    Other orders  -     Cholecalciferol (Vitamin D3) 1 25 MG (58514 UT) CAPS; Take 1 25 mg by mouth in the morning Vitamin D3 1 25 MG (95668KY) CAPS          Subjective:      Patient ID: Dilip Gresham is a 74 y o  male  HPI    Patient presents for follow-up of chronic conditions as detailed in the assessment and plan  The following portions of the patient's history were reviewed and updated as appropriate: current medications, past family history, past medical history, past social history, past surgical history and problem list     Review of Systems      Objective:    /72   Pulse 72   Ht 5' 7 5" (1 715 m)   Wt 87 4 kg (192 lb 9 6 oz)   SpO2 98%   BMI 29 72 kg/m²      Physical Exam  HENT:      Head: Normocephalic  Cardiovascular:      Rate and Rhythm: Normal rate and regular rhythm  Heart sounds: No murmur heard  Pulmonary:      Effort: No respiratory distress  Breath sounds: No wheezing or rales  Abdominal:      General: Bowel sounds are normal       Palpations: Abdomen is soft  There is no mass  Tenderness: There is no abdominal tenderness  Neurological:      Mental Status: He is alert and oriented to person, place, and time  Cranial Nerves: No cranial nerve deficit  Motor: No weakness     Psychiatric:         Mood and Affect: Mood normal              Pato Patel MD

## 2022-12-27 NOTE — RESULT ENCOUNTER NOTE
Call patient: Alonzo Andrade that he has no iron deficiency anemia  His B12 level is in the low normal range therefore I sent vitamin B12 1000 mcg daily to [facility] [address]    He should start taking this medication

## 2022-12-27 NOTE — ASSESSMENT & PLAN NOTE
Patient has GERD that manifested as cough when laying down  Has been on omeprazole for years  I will try to switch him to Pepcid 20 mg p o  twice daily to avoid potential side effects with PPI  He will call me if cough returns on Pepcid in which case I will resume omeprazole

## 2022-12-27 NOTE — ASSESSMENT & PLAN NOTE
Patient is chronic hypertension  Blood pressure is controlled today  His electrolytes renal function were stable this month  He will continue losartan    I will recheck his lites with renal function in 6 months

## 2022-12-27 NOTE — ASSESSMENT & PLAN NOTE
Patient has hypothyroidism  His TSH was more than 5 this month    I increase the dose of levothyroxine to 125 mcg daily and will check TSH in 6 months

## 2022-12-27 NOTE — ASSESSMENT & PLAN NOTE
Patient is impaired fasting glucose  He exercises regularly and watches his diet  His A1c decreased  Continue diet and exercise    I will recheck his A1c in 6 months

## 2023-05-31 DIAGNOSIS — E53.8 B12 DEFICIENCY: Primary | ICD-10-CM

## 2023-06-01 ENCOUNTER — RA CDI HCC (OUTPATIENT)
Dept: OTHER | Facility: HOSPITAL | Age: 73
End: 2023-06-01

## 2023-06-01 NOTE — PROGRESS NOTES
[address]  coding opportunities       Chart reviewed, no opportunity found: CHART REVIEWED, NO OPPORTUNITY FOUND        Patients Insurance     Medicare Insurance: Medicare

## 2023-06-07 ENCOUNTER — APPOINTMENT (OUTPATIENT)
Dept: LAB | Facility: HOSPITAL | Age: 73
End: 2023-06-07
Payer: MEDICARE

## 2023-06-07 DIAGNOSIS — E53.8 B12 DEFICIENCY: ICD-10-CM

## 2023-06-07 LAB
ALBUMIN SERPL BCP-MCNC: 4 G/DL (ref 3.5–5)
ALP SERPL-CCNC: 96 U/L (ref 46–116)
ALT SERPL W P-5'-P-CCNC: 25 U/L (ref 12–78)
ANION GAP SERPL CALCULATED.3IONS-SCNC: 3 MMOL/L (ref 4–13)
AST SERPL W P-5'-P-CCNC: 14 U/L (ref 5–45)
BASOPHILS # BLD AUTO: 0.04 THOUSANDS/ÂΜL (ref 0–0.1)
BASOPHILS NFR BLD AUTO: 1 % (ref 0–1)
BILIRUB SERPL-MCNC: 0.46 MG/DL (ref 0.2–1)
BUN SERPL-MCNC: 22 MG/DL (ref 5–25)
CALCIUM SERPL-MCNC: 9.5 MG/DL (ref 8.3–10.1)
CHLORIDE SERPL-SCNC: 109 MMOL/L (ref 96–108)
CO2 SERPL-SCNC: 27 MMOL/L (ref 21–32)
CREAT SERPL-MCNC: 1.34 MG/DL (ref 0.6–1.3)
EOSINOPHIL # BLD AUTO: 0.29 THOUSAND/ÂΜL (ref 0–0.61)
EOSINOPHIL NFR BLD AUTO: 5 % (ref 0–6)
ERYTHROCYTE [DISTWIDTH] IN BLOOD BY AUTOMATED COUNT: 13.2 % (ref 11.6–15.1)
EST. AVERAGE GLUCOSE BLD GHB EST-MCNC: 117 MG/DL
GFR SERPL CREATININE-BSD FRML MDRD: 51 ML/MIN/1.73SQ M
GLUCOSE P FAST SERPL-MCNC: 134 MG/DL (ref 65–99)
HBA1C MFR BLD: 5.7 %
HCT VFR BLD AUTO: 40.2 % (ref 36.5–49.3)
HGB BLD-MCNC: 12.8 G/DL (ref 12–17)
IMM GRANULOCYTES # BLD AUTO: 0.02 THOUSAND/UL (ref 0–0.2)
IMM GRANULOCYTES NFR BLD AUTO: 0 % (ref 0–2)
LYMPHOCYTES # BLD AUTO: 1.8 THOUSANDS/ÂΜL (ref 0.6–4.47)
LYMPHOCYTES NFR BLD AUTO: 28 % (ref 14–44)
MCH RBC QN AUTO: 30.2 PG (ref 26.8–34.3)
MCHC RBC AUTO-ENTMCNC: 31.8 G/DL (ref 31.4–37.4)
MCV RBC AUTO: 95 FL (ref 82–98)
MONOCYTES # BLD AUTO: 0.5 THOUSAND/ÂΜL (ref 0.17–1.22)
MONOCYTES NFR BLD AUTO: 8 % (ref 4–12)
NEUTROPHILS # BLD AUTO: 3.79 THOUSANDS/ÂΜL (ref 1.85–7.62)
NEUTS SEG NFR BLD AUTO: 58 % (ref 43–75)
NRBC BLD AUTO-RTO: 0 /100 WBCS
PLATELET # BLD AUTO: 303 THOUSANDS/UL (ref 149–390)
PMV BLD AUTO: 9.4 FL (ref 8.9–12.7)
POTASSIUM SERPL-SCNC: 4.4 MMOL/L (ref 3.5–5.3)
PROT SERPL-MCNC: 7.7 G/DL (ref 6.4–8.4)
RBC # BLD AUTO: 4.24 MILLION/UL (ref 3.88–5.62)
SODIUM SERPL-SCNC: 139 MMOL/L (ref 135–147)
T4 FREE SERPL-MCNC: 0.96 NG/DL (ref 0.61–1.12)
TSH SERPL DL<=0.05 MIU/L-ACNC: 4.66 UIU/ML (ref 0.45–4.5)
VIT B12 SERPL-MCNC: 465 PG/ML (ref 180–914)
WBC # BLD AUTO: 6.44 THOUSAND/UL (ref 4.31–10.16)

## 2023-06-07 PROCEDURE — 82607 VITAMIN B-12: CPT

## 2023-06-12 ENCOUNTER — OFFICE VISIT (OUTPATIENT)
Dept: FAMILY MEDICINE CLINIC | Facility: HOSPITAL | Age: 73
End: 2023-06-12
Payer: MEDICARE

## 2023-06-12 VITALS
WEIGHT: 188 LBS | DIASTOLIC BLOOD PRESSURE: 60 MMHG | HEART RATE: 85 BPM | RESPIRATION RATE: 16 BRPM | SYSTOLIC BLOOD PRESSURE: 130 MMHG | BODY MASS INDEX: 28.49 KG/M2 | HEIGHT: 68 IN | OXYGEN SATURATION: 95 %

## 2023-06-12 DIAGNOSIS — Z00.00 MEDICARE ANNUAL WELLNESS VISIT, SUBSEQUENT: Primary | ICD-10-CM

## 2023-06-12 DIAGNOSIS — E03.9 ACQUIRED HYPOTHYROIDISM: ICD-10-CM

## 2023-06-12 DIAGNOSIS — Z12.11 SCREENING FOR COLORECTAL CANCER: ICD-10-CM

## 2023-06-12 DIAGNOSIS — I10 ESSENTIAL HYPERTENSION: ICD-10-CM

## 2023-06-12 DIAGNOSIS — Z12.12 SCREENING FOR COLORECTAL CANCER: ICD-10-CM

## 2023-06-12 DIAGNOSIS — Z12.5 SCREENING FOR PROSTATE CANCER: ICD-10-CM

## 2023-06-12 PROBLEM — M15.9 PRIMARY OSTEOARTHRITIS INVOLVING MULTIPLE JOINTS: Status: ACTIVE | Noted: 2023-06-12

## 2023-06-12 PROBLEM — M15.0 PRIMARY OSTEOARTHRITIS INVOLVING MULTIPLE JOINTS: Status: ACTIVE | Noted: 2023-06-12

## 2023-06-12 PROBLEM — M19.90 ARTHRITIS: Status: RESOLVED | Noted: 2018-12-27 | Resolved: 2023-06-12

## 2023-06-12 PROCEDURE — G0439 PPPS, SUBSEQ VISIT: HCPCS | Performed by: INTERNAL MEDICINE

## 2023-06-12 RX ORDER — FLUOROURACIL 50 MG/G
CREAM TOPICAL
COMMUNITY
Start: 2023-04-27

## 2023-06-12 RX ORDER — LEVOTHYROXINE SODIUM 0.15 MG/1
150 TABLET ORAL
Qty: 90 TABLET | Refills: 1 | Status: SHIPPED | OUTPATIENT
Start: 2023-06-12

## 2023-06-12 RX ORDER — ALBUTEROL SULFATE 90 UG/1
1-2 AEROSOL, METERED RESPIRATORY (INHALATION)
COMMUNITY
Start: 2023-03-06

## 2023-06-12 NOTE — PROGRESS NOTES
Assessment and Plan:     Problem List Items Addressed This Visit        Endocrine    Acquired hypothyroidism    Relevant Medications    levothyroxine (Euthyrox) 150 mcg tablet    Other Relevant Orders    TSH, 3rd generation with Free T4 reflex       Cardiovascular and Mediastinum    Essential hypertension    Relevant Orders    Basic metabolic panel   Other Visit Diagnoses     Medicare annual wellness visit, subsequent    -  Primary    Screening for colorectal cancer        Relevant Orders    Ambulatory referral to Gastroenterology    Screening for prostate cancer        Relevant Orders    PSA, Total Screen          Depression Screening and Follow-up Plan: Patient was screened for depression during today's encounter  They screened negative with a PHQ-2 score of 0  Preventive health issues were discussed with patient, and age appropriate screening tests were ordered as noted in patient's After Visit Summary  Personalized health advice and appropriate referrals for health education or preventive services given if needed, as noted in patient's After Visit Summary  History of Present Illness:     Patient presents for a Medicare Wellness Visit    HPI   Patient Care Team:  Nacho Miguel MD as PCP - General (Internal Medicine)     Review of Systems:     Review of Systems   Constitutional: Negative for fever  HENT: Negative for hearing loss  Eyes: Negative for visual disturbance  Respiratory: Negative for cough and shortness of breath  Cardiovascular: Negative for chest pain and palpitations  Gastrointestinal: Negative for abdominal pain, blood in stool, constipation and diarrhea  Endocrine: Negative for polydipsia and polyphagia  Genitourinary: Negative for difficulty urinating and hematuria  Rare urinary incontinence     Musculoskeletal: Positive for arthralgias (intermittent knee and hand arthritis treated with tylenol prn)  Skin: Negative for rash     Neurological: Negative for headaches  Psychiatric/Behavioral: Negative for dysphoric mood  All other systems reviewed and are negative         Problem List:     Patient Active Problem List   Diagnosis   • Pure hypercholesterolemia   • Essential hypertension   • Acquired hypothyroidism   • S/p total knee replacement, bilateral   • IFG (impaired fasting glucose)   • Seasonal allergic rhinitis   • Gastroesophageal reflux disease without esophagitis   • B12 deficiency   • Primary osteoarthritis involving multiple joints      Past Medical and Surgical History:     Past Medical History:   Diagnosis Date   • Allergic 2015    spring/   • Arthritis    • Disease of thyroid gland    • Hypertension    • Kidney stone     one time   • Sleep disorder      Past Surgical History:   Procedure Laterality Date   • CHOLECYSTECTOMY     • EYE SURGERY  cataract removal/lens implant       • JOINT REPLACEMENT     • KNEE SURGERY     • LEG WOUND REPAIR / CLOSURE        Family History:     Family History   Problem Relation Age of Onset   • Alcohol abuse Brother    • Depression Brother    • Breast cancer Mother    • Cancer Mother    • Heart disease Father    • Stroke Brother    • Heart disease Brother    • Heart disease Brother    • Arthritis Brother    • Heart disease Brother    • Heart disease Brother    • Thyroid disease Daughter    • Asthma Daughter    • Thyroid disease Daughter    • Autoimmune disease Sister    • Breast cancer Sister    • Mental illness Neg Hx       Social History:     Social History     Socioeconomic History   • Marital status: /Civil Union     Spouse name: None   • Number of children: None   • Years of education: None   • Highest education level: None   Occupational History   • None   Tobacco Use   • Smoking status: Former     Packs/day: 0 00     Types: Cigarettes, Pipe, Cigars     Start date:      Quit date: 1983     Years since quittin 4   • Smokeless tobacco: Never   • Tobacco comments:     quit in  Vaping Use   • Vaping Use: Never used   Substance and Sexual Activity   • Alcohol use: Yes     Alcohol/week: 8 0 standard drinks of alcohol     Types: 7 Glasses of wine, 1 Shots of liquor per week   • Drug use: No   • Sexual activity: Not Currently     Partners: Female     Birth control/protection: Male Sterilization   Other Topics Concern   • None   Social History Narrative    Lives with spouse  Social Determinants of Health     Financial Resource Strain: Low Risk  (6/5/2023)    Overall Financial Resource Strain (CARDIA)    • Difficulty of Paying Living Expenses: Not hard at all   Food Insecurity: Not on file   Transportation Needs: No Transportation Needs (6/5/2023)    PRAPARE - Transportation    • Lack of Transportation (Medical): No    • Lack of Transportation (Non-Medical): No   Physical Activity: Not on file   Stress: Not on file   Social Connections: Not on file   Intimate Partner Violence: Not on file   Housing Stability: Not on file      Medications and Allergies:     Current Outpatient Medications   Medication Sig Dispense Refill   • Ascorbic Acid (vitamin C) 1000 MG tablet Take 1,000 mg by mouth daily     • Cholecalciferol (Vitamin D3) 1 25 MG (19526 UT) CAPS Take 1 25 mg by mouth in the morning Vitamin D3 1 25 MG (11836CS) CAPS     • ELDERBERRY PO Take 1 tablet by mouth daily     • famotidine (PEPCID) 20 mg tablet Take 1 tablet (20 mg total) by mouth 2 (two) times a day 180 tablet 3   • fluorouracil (EFUDEX) 5 % cream APPLY CREAM TO THE SCALP TWICE DAILY FOR 2 WEEKS  KEEP OUT OF REACH OF CHILDREN/PETS  WILL CAUSE REDNESS AND CRUSTING       • levothyroxine (Euthyrox) 150 mcg tablet Take 1 tablet (150 mcg total) by mouth daily in the early morning 90 tablet 1   • losartan (COZAAR) 100 MG tablet Take 1 tablet (100 mg total) by mouth daily 90 tablet 3   • simvastatin (ZOCOR) 20 mg tablet Take 1 tablet (20 mg total) by mouth daily at bedtime 90 tablet 3   • traZODone (DESYREL) 150 mg tablet Take 1 tablet (150 mg total) by mouth daily at bedtime (Patient taking differently: 1/2 tab at hs) 45 tablet 3   • vitamin B-12 (VITAMIN B-12) 500 mcg tablet Take 2 tablets (1,000 mcg total) by mouth daily 180 tablet 1   • albuterol (PROVENTIL HFA,VENTOLIN HFA) 90 mcg/act inhaler Inhale 1-2 puffs (Patient not taking: Reported on 6/12/2023)     • amoxicillin (AMOXIL) 500 mg capsule Take 500 mg by mouth Prior to dental (Patient not taking: Reported on 12/27/2022)       No current facility-administered medications for this visit  No Known Allergies   Immunizations:     Immunization History   Administered Date(s) Administered   • COVID-19 MODERNA VACC 0 5 ML IM 12/29/2020, 01/27/2021, 08/26/2021, 04/04/2022   • INFLUENZA 10/13/2016, 09/18/2017, 09/28/2022   • Influenza, high dose seasonal 0 7 mL 09/18/2018, 10/15/2019, 10/06/2020   • Pneumococcal Conjugate 13-Valent 06/19/2015   • Pneumococcal Polysaccharide PPV23 10/13/2016   • Tdap 04/27/2015   • Zoster Vaccine Recombinant 07/13/2020, 09/14/2020      Health Maintenance:         Topic Date Due   • Colorectal Cancer Screening  10/09/2023   • Hepatitis C Screening  Completed         Topic Date Due   • COVID-19 Vaccine (5 - Moderna series) 05/30/2022      Medicare Screening Tests and Risk Assessments:     Corey Kuo is here for his Subsequent Wellness visit  Health Risk Assessment:   Patient rates overall health as very good  Patient feels that their physical health rating is same  Patient is very satisfied with their life  Eyesight was rated as same  Hearing was rated as same  Patient feels that their emotional and mental health rating is same  Patients states they are never, rarely angry  Patient states they are sometimes unusually tired/fatigued  Pain experienced in the last 7 days has been some  Patient's pain rating has been 5/10  Patient states that he has experienced no weight loss or gain in last 6 months       Depression Screening:   PHQ-2 Score: 0      Fall Risk Screening: In the past year, patient has experienced: no history of falling in past year      Home Safety:  Patient does not have trouble with stairs inside or outside of their home  Patient has working smoke alarms and has working carbon monoxide detector  Home safety hazards include: none  Nutrition:   Current diet is Low Carb  Medications:   Patient is currently taking over-the-counter supplements  OTC medications include: tylenol and/or ibuprofen  Patient is able to manage medications  Activities of Daily Living (ADLs)/Instrumental Activities of Daily Living (IADLs):   Walk and transfer into and out of bed and chair?: Yes  Dress and groom yourself?: Yes    Bathe or shower yourself?: Yes    Feed yourself? Yes  Do your laundry/housekeeping?: Yes  Manage your money, pay your bills and track your expenses?: Yes  Make your own meals?: Yes    Do your own shopping?: Yes    Previous Hospitalizations:   Any hospitalizations or ED visits within the last 12 months?: No      Advance Care Planning:   Living will: Yes    Durable POA for healthcare:  Yes    Advanced directive: Yes    Advanced directive counseling given: Yes      PREVENTIVE SCREENINGS      Cardiovascular Screening:    General: Screening Not Indicated and History Lipid Disorder      Diabetes Screening:     General: Screening Current      Colorectal Cancer Screening:     General: Screening Current    Due for: Colonoscopy - Low Risk      Prostate Cancer Screening:    General: Screening Not Indicated and Risks and Benefits Discussed    Due for: PSA      Abdominal Aortic Aneurysm (AAA) Screening:    Risk factors include: age between 73-69 yo and tobacco use        Lung Cancer Screening:     General: Screening Not Indicated      Hepatitis C Screening:    General: Screening Current    Screening, Brief Intervention, and Referral to Treatment (SBIRT)    Screening  Typical number of drinks in a day: 2  Typical number of drinks in a week: 8  Interpretation: "Low risk drinking behavior  AUDIT-C Screenin) How often did you have a drink containing alcohol in the past year? 4 or more times a week  2) How many drinks did you have on a typical day when you were drinking in the past year? 1 to 2  3) How often did you have 6 or more drinks on one occasion in the past year? never    AUDIT-C Score: 4  Interpretation: Score 4-12 (male): POSITIVE screen for alcohol misuse    AUDIT Screenin) How often during the last year have you found that you were not able to stop drinking once you had started? 0 - never  5) How often during the last year have you failed to do what was normally expected from you because of drinking? 0 - never  6) How often during the last year have you needed a first drink in the morning to get yourself going after a heavy drinking session? 0 - never  7) How often during the last year have you had a feeling of guilt or remorse after drinking? 0 - never  8) How often during the last year have you been unable to remember what happened the night before because you had been drinking? 0 - never  9) Have you or someone else been injured as a result of your drinking? 0 - no  10) Has a relative or friend or a doctor or another health worker been concerned about your drinking or suggested you cut down? 0 - no    AUDIT Score: 4  Interpretation: Low risk alcohol consumption    Single Item Drug Screening:  How often have you used an illegal drug (including marijuana) or a prescription medication for non-medical reasons in the past year? never    Single Item Drug Screen Score: 0  Interpretation: Negative screen for possible drug use disorder    Brief Intervention  Alcohol & drug use screenings were reviewed  No concerns regarding substance use disorder identified  Other Counseling Topics:   Regular weightbearing exercise  No results found       Physical Exam:     /60   Pulse 85   Resp 16   Ht 5' 7 5\" (1 715 m)   Wt 85 3 kg (188 lb)   SpO2 95%   " BMI 29 01 kg/m²     Physical Exam  Constitutional:       General: He is not in acute distress  Appearance: He is well-developed  He is not toxic-appearing  HENT:      Head: Normocephalic  Right Ear: Tympanic membrane normal  There is no impacted cerumen  Left Ear: Tympanic membrane normal  There is no impacted cerumen  Mouth/Throat:      Mouth: Mucous membranes are moist       Pharynx: No oropharyngeal exudate  Eyes:      Conjunctiva/sclera: Conjunctivae normal    Cardiovascular:      Rate and Rhythm: Normal rate and regular rhythm  Heart sounds: No murmur heard  Pulmonary:      Effort: No respiratory distress  Breath sounds: No wheezing or rales  Abdominal:      General: Bowel sounds are normal       Palpations: Abdomen is soft  Tenderness: There is no abdominal tenderness  Musculoskeletal:         General: No tenderness  Cervical back: Neck supple  Skin:     General: Skin is warm and dry  Neurological:      Mental Status: He is alert and oriented to person, place, and time  Cranial Nerves: No cranial nerve deficit  Motor: No weakness     Psychiatric:         Mood and Affect: Mood normal           Emeka Lewis MD

## 2023-06-12 NOTE — PATIENT INSTRUCTIONS
Medicare Preventive Visit Patient Instructions  Thank you for completing your Welcome to Medicare Visit or Medicare Annual Wellness Visit today  Your next wellness visit will be due in one year (6/12/2024)  The screening/preventive services that you may require over the next 5-10 years are detailed below  Some tests may not apply to you based off risk factors and/or age  Screening tests ordered at today's visit but not completed yet may show as past due  Also, please note that scanned in results may not display below  Preventive Screenings:  Service Recommendations Previous Testing/Comments   Colorectal Cancer Screening  · Colonoscopy    · Fecal Occult Blood Test (FOBT)/Fecal Immunochemical Test (FIT)  · Fecal DNA/Cologuard Test  · Flexible Sigmoidoscopy Age: 39-70 years old   Colonoscopy: every 10 years (May be performed more frequently if at higher risk)  OR  FOBT/FIT: every 1 year  OR  Cologuard: every 3 years  OR  Sigmoidoscopy: every 5 years  Screening may be recommended earlier than age 39 if at higher risk for colorectal cancer  Also, an individualized decision between you and your healthcare provider will decide whether screening between the ages of 74-80 would be appropriate   Colonoscopy: 10/09/2018  FOBT/FIT: Not on file  Cologuard: Not on file  Sigmoidoscopy: Not on file    Screening Current     Prostate Cancer Screening Individualized decision between patient and health care provider in men between ages of 53-78   Medicare will cover every 12 months beginning on the day after your 50th birthday PSA: 1 1 ng/mL     Screening Not Indicated     Hepatitis C Screening Once for adults born between 1945 and 1965  More frequently in patients at high risk for Hepatitis C Hep C Antibody: 06/25/2019    Screening Current   Diabetes Screening 1-2 times per year if you're at risk for diabetes or have pre-diabetes Fasting glucose: 134 mg/dL (6/7/2023)  A1C: 5 7 % (6/7/2023)  Screening Current   Cholesterol Screening Once every 5 years if you don't have a lipid disorder  May order more often based on risk factors  Lipid panel: 12/21/2022  Screening Not Indicated  History Lipid Disorder      Other Preventive Screenings Covered by Medicare:  1  Abdominal Aortic Aneurysm (AAA) Screening: covered once if your at risk  You're considered to be at risk if you have a family history of AAA or a male between the age of 73-68 who smoking at least 100 cigarettes in your lifetime  2  Lung Cancer Screening: covers low dose CT scan once per year if you meet all of the following conditions: (1) Age 50-69; (2) No signs or symptoms of lung cancer; (3) Current smoker or have quit smoking within the last 15 years; (4) You have a tobacco smoking history of at least 20 pack years (packs per day x number of years you smoked); (5) You get a written order from a healthcare provider  3  Glaucoma Screening: covered annually if you're considered high risk: (1) You have diabetes OR (2) Family history of glaucoma OR (3)  aged 48 and older OR (3)  American aged 72 and older  3  Osteoporosis Screening: covered every 2 years if you meet one of the following conditions: (1) Have a vertebral abnormality; (2) On glucocorticoid therapy for more than 3 months; (3) Have primary hyperparathyroidism; (4) On osteoporosis medications and need to assess response to drug therapy  5  HIV Screening: covered annually if you're between the age of 12-76  Also covered annually if you are younger than 13 and older than 72 with risk factors for HIV infection  For pregnant patients, it is covered up to 3 times per pregnancy      Immunizations:  Immunization Recommendations   Influenza Vaccine Annual influenza vaccination during flu season is recommended for all persons aged >= 6 months who do not have contraindications   Pneumococcal Vaccine   * Pneumococcal conjugate vaccine = PCV13 (Prevnar 13), PCV15 (Vaxneuvance), PCV20 (Prevnar 20)  * Pneumococcal polysaccharide vaccine = PPSV23 (Pneumovax) Adults 2364 years old: 1-3 doses may be recommended based on certain risk factors  Adults 72 years old: 1-2 doses may be recommended based off what pneumonia vaccine you previously received   Hepatitis B Vaccine 3 dose series if at intermediate or high risk (ex: diabetes, end stage renal disease, liver disease)   Tetanus (Td) Vaccine - COST NOT COVERED BY MEDICARE PART B Following completion of primary series, a booster dose should be given every 10 years to maintain immunity against tetanus  Td may also be given as tetanus wound prophylaxis  Tdap Vaccine - COST NOT COVERED BY MEDICARE PART B Recommended at least once for all adults  For pregnant patients, recommended with each pregnancy  Shingles Vaccine (Shingrix) - COST NOT COVERED BY MEDICARE PART B  2 shot series recommended in those aged 48 and above     Health Maintenance Due:      Topic Date Due   • Colorectal Cancer Screening  10/09/2023   • Hepatitis C Screening  Completed     Immunizations Due:      Topic Date Due   • COVID-19 Vaccine (5 - Moderna series) 05/30/2022     Advance Directives   What are advance directives? Advance directives are legal documents that state your wishes and plans for medical care  These plans are made ahead of time in case you lose your ability to make decisions for yourself  Advance directives can apply to any medical decision, such as the treatments you want, and if you want to donate organs  What are the types of advance directives? There are many types of advance directives, and each state has rules about how to use them  You may choose a combination of any of the following:  · Living will: This is a written record of the treatment you want  You can also choose which treatments you do not want, which to limit, and which to stop at a certain time  This includes surgery, medicine, IV fluid, and tube feedings  · Durable power of  for healthcare [place] [facility]):   This is a written record that states who you want to make healthcare choices for you when you are unable to make them for yourself  This person, called a proxy, is usually a family member or a friend  You may choose more than 1 proxy  · Do not resuscitate (DNR) order:  A DNR order is used in case your heart stops beating or you stop breathing  It is a request not to have certain forms of treatment, such as CPR  A DNR order may be included in other types of advance directives  · Medical directive: This covers the care that you want if you are in a coma, near death, or unable to make decisions for yourself  You can list the treatments you want for each condition  Treatment may include pain medicine, surgery, blood transfusions, dialysis, IV or tube feedings, and a ventilator (breathing machine)  · Values history: This document has questions about your views, beliefs, and how you feel and think about life  This information can help others choose the care that you would choose  Why are advance directives important? An advance directive helps you control your care  Although spoken wishes may be used, it is better to have your wishes written down  Spoken wishes can be misunderstood, or not followed  Treatments may be given even if you do not want them  An advance directive may make it easier for your family to make difficult choices about your care  Weight Management   Why it is important to manage your weight:  Being overweight increases your risk of health conditions such as heart disease, high blood pressure, type 2 diabetes, and certain types of cancer  It can also increase your risk for osteoarthritis, sleep apnea, and other respiratory problems  Aim for a slow, steady weight loss  Even a small amount of weight loss can lower your risk of health problems  How to lose weight safely:  A safe and healthy way to lose weight is to eat fewer calories and get regular exercise   You can lose up about 1 pound a week by decreasing the number of calories you eat by 500 calories each day  Healthy meal plan for weight management:  A healthy meal plan includes a variety of foods, contains fewer calories, and helps you stay healthy  A healthy meal plan includes the following:  · Eat whole-grain foods more often  A healthy meal plan should contain fiber  Fiber is the part of grains, fruits, and vegetables that is not broken down by your body  Whole-grain foods are healthy and provide extra fiber in your diet  Some examples of whole-grain foods are whole-wheat breads and pastas, oatmeal, brown rice, and bulgur  · Eat a variety of vegetables every day  Include dark, leafy greens such as spinach, kale, ilene greens, and mustard greens  Eat yellow and orange vegetables such as carrots, sweet potatoes, and winter squash  · Eat a variety of fruits every day  Choose fresh or canned fruit (canned in its own juice or light syrup) instead of juice  Fruit juice has very little or no fiber  · Eat low-fat dairy foods  Drink fat-free (skim) milk or 1% milk  Eat fat-free yogurt and low-fat cottage cheese  Try low-fat cheeses such as mozzarella and other reduced-fat cheeses  · Choose meat and other protein foods that are low in fat  Choose beans or other legumes such as split peas or lentils  Choose fish, skinless poultry (chicken or turkey), or lean cuts of red meat (beef or pork)  Before you cook meat or poultry, cut off any visible fat  · Use less fat and oil  Try baking foods instead of frying them  Add less fat, such as margarine, sour cream, regular salad dressing and mayonnaise to foods  Eat fewer high-fat foods  Some examples of high-fat foods include french fries, doughnuts, ice cream, and cakes  · Eat fewer sweets  Limit foods and drinks that are high in sugar  This includes candy, cookies, regular soda, and sweetened drinks  Exercise:  Exercise at least 30 minutes per day on most days of the week   Some examples of exercise "include walking, biking, dancing, and swimming  You can also fit in more physical activity by taking the stairs instead of the elevator or parking farther away from stores  Ask your healthcare provider about the best exercise plan for you  Alcohol Use and Your Health    Drinking too much can harm your health  Excessive alcohol use leads to about 88,000 death in the [place] each year, and shortens the life of those who diet by almost 30 years  Further, excessive drinking cost the economy $249 billion in 2010  Most excessive drinkers are not alcohol dependent  Excessive alcohol use has immediate effects that increase the risk of many harmful health conditions  These are most often the result of binge drinking  Over time, excessive alcohol use can lead to the development of chronic diseases and other series health problems  What is considered a \"drink\"? Excessive alcohol use includes:  · Binge Drinking: For women, 4 or more drinks consumed on one occasion  For men, 5 or more drinks consumed on one occasion  · Heavy Drinking: For women, 8 or more drinks per week  For men, 15 or more drinks per week  · Any alcohol used by pregnant women  · Any alcohol used by those under the age of 21 years    If you choose to drink, do so in moderation:  · Do not drink at all if you are under the age of 24, or if you are or may be pregnant, or have health problems that could be made worse by drinking    · For women, up to 1 drink per day  · For men, up to 2 drinks a day    No one should begin drinking or drink more frequently based on potential health benefits    Short-Term Health Risks:  · Injuries: motor vehicle crashes, falls, drownings, burns  · Violence: homicide, suicide, sexual assault, intimate partner violence  · Alcohol poisoning  · Reproductive health: risky sexual behaviors, unintended prengnacy, sexually transmitted diseases, miscarriage, stillbirth, fetal alcohol syndrome    Long-Term Health " Risks:  · Chronic diseases: high blood pressure, heart disease, stroke, liver disease, digestive problems  · Cancers: breast, mouth and throat, liver, colon  · Learning and memory problems: dementia, poor school performance  · Mental health: depression, anxiety, insomnia  · Social problems: lost productivity, family problems, unemployment  · Alcohol dependence    For support and more information:  · Substance Abuse and [address] , [address] [place]  Web Address: https://Pinoccio/    · Alcoholics Anonymous        Web Address: http://www jalloh info/    https://www cdc gov/alcohol/fact-sheets/alcohol-use htm     © [address] 2018 Information is for End User's use only and may not be sold, redistributed or otherwise used for commercial purposes  All illustrations and images included in CareNotes® are the copyrighted property of [employer] , [employer]  or [employer] [facility] Preventive Visit Patient Instructions  Thank you for completing your Welcome to Medicare Visit or Medicare Annual Wellness Visit today  Your next wellness visit will be due in one year (6/12/2024)  The screening/preventive services that you may require over the next 5-10 years are detailed below  Some tests may not apply to you based off risk factors and/or age  Screening tests ordered at today's visit but not completed yet may show as past due  Also, please note that scanned in results may not display below    Preventive Screenings:  Service Recommendations Previous Testing/Comments   Colorectal Cancer Screening  · Colonoscopy    · Fecal Occult Blood Test (FOBT)/Fecal Immunochemical Test (FIT)  · Fecal DNA/Cologuard Test  · Flexible Sigmoidoscopy Age: 39-70 years old   Colonoscopy: every 10 years (May be performed more frequently if at higher risk)  OR  FOBT/FIT: every 1 year  OR  Cologuard: every 3 years  OR  Sigmoidoscopy: every 5 years  Screening may be recommended earlier than age 39 if at higher risk for colorectal cancer  Also, an individualized decision between you and your healthcare provider will decide whether screening between the ages of 74-80 would be appropriate  Colonoscopy: 10/09/2018  FOBT/FIT: Not on file  Cologuard: Not on file  Sigmoidoscopy: Not on file    Screening Current     Prostate Cancer Screening Individualized decision between patient and health care provider in men between ages of 53-78   Medicare will cover every 12 months beginning on the day after your 50th birthday PSA: 1 1 ng/mL     Screening Not Indicated     Hepatitis C Screening Once for adults born between 1945 and 1965  More frequently in patients at high risk for Hepatitis C Hep C Antibody: 06/25/2019    Screening Current   Diabetes Screening 1-2 times per year if you're at risk for diabetes or have pre-diabetes Fasting glucose: 134 mg/dL (6/7/2023)  A1C: 5 7 % (6/7/2023)  Screening Current   Cholesterol Screening Once every 5 years if you don't have a lipid disorder  May order more often based on risk factors  Lipid panel: 12/21/2022  Screening Not Indicated  History Lipid Disorder      Other Preventive Screenings Covered by Medicare:  6  Abdominal Aortic Aneurysm (AAA) Screening: covered once if your at risk  You're considered to be at risk if you have a family history of AAA or a male between the age of 73-68 who smoking at least 100 cigarettes in your lifetime  7  Lung Cancer Screening: covers low dose CT scan once per year if you meet all of the following conditions: (1) Age 50-69; (2) No signs or symptoms of lung cancer; (3) Current smoker or have quit smoking within the last 15 years; (4) You have a tobacco smoking history of at least 20 pack years (packs per day x number of years you smoked); (5) You get a written order from a healthcare provider    8  Glaucoma Screening: covered annually if you're considered high risk: (1) You have diabetes OR (2) Family history of glaucoma OR (3)  aged 48 and older OR (3)  American aged 72 and older  5  Osteoporosis Screening: covered every 2 years if you meet one of the following conditions: (1) Have a vertebral abnormality; (2) On glucocorticoid therapy for more than 3 months; (3) Have primary hyperparathyroidism; (4) On osteoporosis medications and need to assess response to drug therapy  10  HIV Screening: covered annually if you're between the age of 12-76  Also covered annually if you are younger than 13 and older than 72 with risk factors for HIV infection  For pregnant patients, it is covered up to 3 times per pregnancy  Immunizations:  Immunization Recommendations   Influenza Vaccine Annual influenza vaccination during flu season is recommended for all persons aged >= 6 months who do not have contraindications   Pneumococcal Vaccine   * Pneumococcal conjugate vaccine = PCV13 (Prevnar 13), PCV15 (Vaxneuvance), PCV20 (Prevnar 20)  * Pneumococcal polysaccharide vaccine = PPSV23 (Pneumovax) Adults 25-60 years old: 1-3 doses may be recommended based on certain risk factors  Adults 72 years old: 1-2 doses may be recommended based off what pneumonia vaccine you previously received   Hepatitis B Vaccine 3 dose series if at intermediate or high risk (ex: diabetes, end stage renal disease, liver disease)   Tetanus (Td) Vaccine - COST NOT COVERED BY MEDICARE PART B Following completion of primary series, a booster dose should be given every 10 years to maintain immunity against tetanus  Td may also be given as tetanus wound prophylaxis  Tdap Vaccine - COST NOT COVERED BY MEDICARE PART B Recommended at least once for all adults  For pregnant patients, recommended with each pregnancy     Shingles Vaccine (Shingrix) - COST NOT COVERED BY MEDICARE PART B  2 shot series recommended in those aged 48 and above     Health Maintenance Due:      Topic Date Due   • Colorectal Cancer Screening  10/09/2023   • Hepatitis C Screening  Completed     Immunizations Due:      Topic Date Due   • COVID-19 Vaccine (5 - Moderna series) 05/30/2022     Advance Directives   What are advance directives? Advance directives are legal documents that state your wishes and plans for medical care  These plans are made ahead of time in case you lose your ability to make decisions for yourself  Advance directives can apply to any medical decision, such as the treatments you want, and if you want to donate organs  What are the types of advance directives? There are many types of advance directives, and each state has rules about how to use them  You may choose a combination of any of the following:  · Living will: This is a written record of the treatment you want  You can also choose which treatments you do not want, which to limit, and which to stop at a certain time  This includes surgery, medicine, IV fluid, and tube feedings  · Durable power of  for healthcare [facility]): This is a written record that states who you want to make healthcare choices for you when you are unable to make them for yourself  This person, called a proxy, is usually a family member or a friend  You may choose more than 1 proxy  · Do not resuscitate (DNR) order:  A DNR order is used in case your heart stops beating or you stop breathing  It is a request not to have certain forms of treatment, such as CPR  A DNR order may be included in other types of advance directives  · Medical directive: This covers the care that you want if you are in a coma, near death, or unable to make decisions for yourself  You can list the treatments you want for each condition  Treatment may include pain medicine, surgery, blood transfusions, dialysis, IV or tube feedings, and a ventilator (breathing machine)  · Values history: This document has questions about your views, beliefs, and how you feel and think about life  This information can help others choose the care that you would choose    Why are advance directives important? An advance directive helps you control your care  Although spoken wishes may be used, it is better to have your wishes written down  Spoken wishes can be misunderstood, or not followed  Treatments may be given even if you do not want them  An advance directive may make it easier for your family to make difficult choices about your care  Weight Management   Why it is important to manage your weight:  Being overweight increases your risk of health conditions such as heart disease, high blood pressure, type 2 diabetes, and certain types of cancer  It can also increase your risk for osteoarthritis, sleep apnea, and other respiratory problems  Aim for a slow, steady weight loss  Even a small amount of weight loss can lower your risk of health problems  How to lose weight safely:  A safe and healthy way to lose weight is to eat fewer calories and get regular exercise  You can lose up about 1 pound a week by decreasing the number of calories you eat by 500 calories each day  Healthy meal plan for weight management:  A healthy meal plan includes a variety of foods, contains fewer calories, and helps you stay healthy  A healthy meal plan includes the following:  · Eat whole-grain foods more often  A healthy meal plan should contain fiber  Fiber is the part of grains, fruits, and vegetables that is not broken down by your body  Whole-grain foods are healthy and provide extra fiber in your diet  Some examples of whole-grain foods are whole-wheat breads and pastas, oatmeal, brown rice, and bulgur  · Eat a variety of vegetables every day  Include dark, leafy greens such as spinach, kale, ilene greens, and mustard greens  Eat yellow and orange vegetables such as carrots, sweet potatoes, and winter squash  · Eat a variety of fruits every day  Choose fresh or canned fruit (canned in its own juice or light syrup) instead of juice  Fruit juice has very little or no fiber  · Eat low-fat dairy foods    Drink "fat-free (skim) milk or 1% milk  Eat fat-free yogurt and low-fat cottage cheese  Try low-fat cheeses such as mozzarella and other reduced-fat cheeses  · Choose meat and other protein foods that are low in fat  Choose beans or other legumes such as split peas or lentils  Choose fish, skinless poultry (chicken or turkey), or lean cuts of red meat (beef or pork)  Before you cook meat or poultry, cut off any visible fat  · Use less fat and oil  Try baking foods instead of frying them  Add less fat, such as margarine, sour cream, regular salad dressing and mayonnaise to foods  Eat fewer high-fat foods  Some examples of high-fat foods include french fries, doughnuts, ice cream, and cakes  · Eat fewer sweets  Limit foods and drinks that are high in sugar  This includes candy, cookies, regular soda, and sweetened drinks  Exercise:  Exercise at least 30 minutes per day on most days of the week  Some examples of exercise include walking, biking, dancing, and swimming  You can also fit in more physical activity by taking the stairs instead of the elevator or parking farther away from stores  Ask your healthcare provider about the best exercise plan for you  Alcohol Use and Your Health    Drinking too much can harm your health  Excessive alcohol use leads to about 88,000 death in the [place] each year, and shortens the life of those who diet by almost 30 years  Further, excessive drinking cost the economy $249 billion in 2010  Most excessive drinkers are not alcohol dependent  Excessive alcohol use has immediate effects that increase the risk of many harmful health conditions  These are most often the result of binge drinking  Over time, excessive alcohol use can lead to the development of chronic diseases and other series health problems  What is considered a \"drink\"? Excessive alcohol use includes:  · Binge Drinking: For women, 4 or more drinks consumed on one occasion   For men, 5 or more " drinks consumed on one occasion  · Heavy Drinking: For women, 8 or more drinks per week  For men, 15 or more drinks per week  · Any alcohol used by pregnant women  · Any alcohol used by those under the age of 21 years    If you choose to drink, do so in moderation:  · Do not drink at all if you are under the age of 24, or if you are or may be pregnant, or have health problems that could be made worse by drinking  · For women, up to 1 drink per day  · For men, up to 2 drinks a day    No one should begin drinking or drink more frequently based on potential health benefits    Short-Term Health Risks:  · Injuries: motor vehicle crashes, falls, drownings, burns  · Violence: homicide, suicide, sexual assault, intimate partner violence  · Alcohol poisoning  · Reproductive health: risky sexual behaviors, unintended prengnacy, sexually transmitted diseases, miscarriage, stillbirth, fetal alcohol syndrome    Long-Term Health Risks:  · Chronic diseases: high blood pressure, heart disease, stroke, liver disease, digestive problems  · Cancers: breast, mouth and throat, liver, colon  · Learning and memory problems: dementia, poor school performance  · Mental health: depression, anxiety, insomnia  · Social problems: lost productivity, family problems, unemployment  · Alcohol dependence    For support and more information:  · Substance Abuse and [facility] , [address] [place]  Web Address: https://[employer]/    · Alcoholics Anonymous        Web Address: http://www jalloh info/    https://www cdc gov/alcohol/fact-sheets/alcohol-use htm     © Copyright [address] 2018 Information is for End User's use only and may not be sold, redistributed or otherwise used for commercial purposes   All illustrations and images included in CareNotes® are the copyrighted property of [employer]  or [address] [place], [place] [address]

## 2023-07-17 ENCOUNTER — TELEPHONE (OUTPATIENT)
Age: 73
End: 2023-07-17

## 2023-07-17 ENCOUNTER — PREP FOR PROCEDURE (OUTPATIENT)
Age: 73
End: 2023-07-17

## 2023-07-17 DIAGNOSIS — Z12.11 COLON CANCER SCREENING: Primary | ICD-10-CM

## 2023-07-17 NOTE — TELEPHONE ENCOUNTER
Scheduled date of colonoscopy (as of today):9/25/2023  Physician performing colonoscopy:Evie  Location of colonoscopy: [facility]  Bowel prep reviewed with patient:ordered  Instructions reviewed with patient by:marcela WRIGHT  Clearances: none    ASC Assessment    Name: Ian Bullock  YOB: 1950  Last Height: 5' 7.5" (1.715 m)  Last weight: 85.3 kg (188 lb)  BMI: 29.01 kg/m²  Procedure: Colon  Diagnosis: screening crc  Date of procedure:9/25/2023  Prep: ordered  Responsible : spouse  Phone#: [address]  Name completing form: Cruz Faustina MA  Date form completed: 07/17/23      If the patient answers yes to any of these questions, schedule in a hospital  Are you pregnant: No  Do you rely on a wheelchair for mobility: No  Have you been diagnosed with End Stage Renal Disease (ESRD): No  Do you need oxygen during the day: No  Have you had a heart attack or stroke within the past three months: No  Have you had a seizure within the past three months: No  Have you ever been informed by anesthesia that you have a difficult airway: No  Additional Questions  Have you had any cardiac testing or are under the care of a Cardiologist (see cardiac list): No  Cardiac list:   Do you have an implanted cardiac defibrillator: No (Comment:  This patient should be scheduled in the hospital)    Have any bleeding problems, such as anemia or hemophilia (If patient has H&H result below 8, schedule in hospital.  H&H must be within 30 days of procedure): No    Had an organ transplant within the past 3 months: No    Do you have any present infections: No  Do you get short of breath when walking a few blocks: No  Have you been diagnosed with diabetes: No  Comments (provide cardiac provider information if applicable):

## 2023-07-17 NOTE — TELEPHONE ENCOUNTER
07/17/23  Screened by: Amalia López MA    Referring Provider pcp    Pre- Screening: There is no height or weight on file to calculate BMI. Has patient been referred for a routine screening Colonoscopy? no  Is the patient between 43-73 years old? yes      Previous Colonoscopy yes   If yes:    Date: 10/2018    Facility: [facility]    Reason: screening      SCHEDULING STAFF: If the patient is between 45yrs-49yrs, please advise patient to confirm benefits/coverage with their insurance company for a routine screening colonoscopy, some insurance carriers will only cover at [address] [place] [address] or older. If the patient is over 66years old, please schedule an office visit. Does the patient want to see a Gastroenterologist prior to their procedure OR are they having any GI symptoms? no    Has the patient been hospitalized or had abdominal surgery in the past 6 months? no    Does the patient use supplemental oxygen? no    Does the patient take Coumadin, Lovenox, Plavix, Elliquis, Xarelto, or other blood thinning medication? no    Has the patient had a stroke, cardiac event, or stent placed in the past year? no    SCHEDULING STAFF: If patient answers NO to above questions, then schedule procedure. If patient answers YES to above questions, then schedule office appointment. Passed OA    If patient is between 45yrs - 49yrs, please advise patient that we will have to confirm benefits & coverage with their insurance company for a routine screening colonoscopy.

## 2023-07-28 ENCOUNTER — LAB (OUTPATIENT)
Dept: LAB | Facility: HOSPITAL | Age: 73
End: 2023-07-28
Payer: MEDICARE

## 2023-07-28 DIAGNOSIS — E03.9 ACQUIRED HYPOTHYROIDISM: ICD-10-CM

## 2023-07-28 DIAGNOSIS — I10 ESSENTIAL HYPERTENSION: ICD-10-CM

## 2023-07-28 LAB
ANION GAP SERPL CALCULATED.3IONS-SCNC: 6 MMOL/L
BUN SERPL-MCNC: 22 MG/DL (ref 5–25)
CALCIUM SERPL-MCNC: 9.4 MG/DL (ref 8.3–10.1)
CHLORIDE SERPL-SCNC: 108 MMOL/L (ref 96–108)
CO2 SERPL-SCNC: 27 MMOL/L (ref 21–32)
CREAT SERPL-MCNC: 1.1 MG/DL (ref 0.6–1.3)
GFR SERPL CREATININE-BSD FRML MDRD: 65 ML/MIN/1.73SQ M
GLUCOSE P FAST SERPL-MCNC: 127 MG/DL (ref 65–99)
POTASSIUM SERPL-SCNC: 4.5 MMOL/L (ref 3.5–5.3)
SODIUM SERPL-SCNC: 141 MMOL/L (ref 135–147)
TSH SERPL DL<=0.05 MIU/L-ACNC: 0.48 UIU/ML (ref 0.45–4.5)

## 2023-07-28 PROCEDURE — 80048 BASIC METABOLIC PNL TOTAL CA: CPT

## 2023-07-28 PROCEDURE — 84443 ASSAY THYROID STIM HORMONE: CPT

## 2023-07-28 PROCEDURE — 36415 COLL VENOUS BLD VENIPUNCTURE: CPT

## 2023-09-05 DIAGNOSIS — E03.9 ACQUIRED HYPOTHYROIDISM: ICD-10-CM

## 2023-09-05 RX ORDER — TRAZODONE HYDROCHLORIDE 150 MG/1
150 TABLET ORAL
Qty: 45 TABLET | Refills: 0 | Status: SHIPPED | OUTPATIENT
Start: 2023-09-05

## 2023-09-11 ENCOUNTER — TELEPHONE (OUTPATIENT)
Dept: GASTROENTEROLOGY | Facility: CLINIC | Age: 73
End: 2023-09-11

## 2023-09-11 NOTE — TELEPHONE ENCOUNTER
Procedure confirmed  Colonoscopy     Via: [facility]    Instructions given: [facility]     Prep Given: Golytely    Call the office if there are any questions.

## 2023-09-25 ENCOUNTER — HOSPITAL ENCOUNTER (OUTPATIENT)
Dept: GASTROENTEROLOGY | Facility: AMBULATORY SURGERY CENTER | Age: 73
Discharge: HOME | End: 2023-09-25
Payer: MEDICARE

## 2023-09-25 ENCOUNTER — ANESTHESIA (OUTPATIENT)
Dept: GASTROENTEROLOGY | Facility: AMBULATORY SURGERY CENTER | Age: 73
End: 2023-09-25

## 2023-09-25 ENCOUNTER — ANESTHESIA EVENT (OUTPATIENT)
Dept: GASTROENTEROLOGY | Facility: AMBULATORY SURGERY CENTER | Age: 73
End: 2023-09-25

## 2023-09-25 VITALS
HEIGHT: 67 IN | RESPIRATION RATE: 18 BRPM | SYSTOLIC BLOOD PRESSURE: 124 MMHG | DIASTOLIC BLOOD PRESSURE: 80 MMHG | BODY MASS INDEX: 29.51 KG/M2 | TEMPERATURE: 98 F | WEIGHT: 188 LBS | HEART RATE: 77 BPM | OXYGEN SATURATION: 98 %

## 2023-09-25 DIAGNOSIS — Z12.11 COLON CANCER SCREENING: ICD-10-CM

## 2023-09-25 DIAGNOSIS — Z86.010 PERSONAL HISTORY OF COLONIC POLYPS: ICD-10-CM

## 2023-09-25 PROCEDURE — G0105 COLORECTAL SCRN; HI RISK IND: HCPCS | Performed by: INTERNAL MEDICINE

## 2023-09-25 RX ORDER — PROPOFOL 10 MG/ML
INJECTION, EMULSION INTRAVENOUS AS NEEDED
Status: DISCONTINUED | OUTPATIENT
Start: 2023-09-25 | End: 2023-09-25

## 2023-09-25 RX ORDER — SODIUM CHLORIDE, SODIUM LACTATE, POTASSIUM CHLORIDE, CALCIUM CHLORIDE 600; 310; 30; 20 MG/100ML; MG/100ML; MG/100ML; MG/100ML
50 INJECTION, SOLUTION INTRAVENOUS CONTINUOUS
Status: DISCONTINUED | OUTPATIENT
Start: 2023-09-25 | End: 2023-09-25

## 2023-09-25 RX ADMIN — PROPOFOL 50 MG: 10 INJECTION, EMULSION INTRAVENOUS at 10:09

## 2023-09-25 RX ADMIN — PROPOFOL 50 MG: 10 INJECTION, EMULSION INTRAVENOUS at 10:00

## 2023-09-25 RX ADMIN — SODIUM CHLORIDE, SODIUM LACTATE, POTASSIUM CHLORIDE, CALCIUM CHLORIDE 50 ML/HR: 600; 310; 30; 20 INJECTION, SOLUTION INTRAVENOUS at 09:47

## 2023-09-25 RX ADMIN — PROPOFOL 100 MG: 10 INJECTION, EMULSION INTRAVENOUS at 09:56

## 2023-09-25 RX ADMIN — PROPOFOL 50 MG: 10 INJECTION, EMULSION INTRAVENOUS at 10:04

## 2023-09-25 NOTE — ANESTHESIA POSTPROCEDURE EVALUATION
Post-Op Assessment Note    CV Status:  Stable  Pain Score: 0    Pain management: adequate     Mental Status:  Arousable and sleepy   Hydration Status:  Stable   PONV Controlled:  Controlled   Airway Patency:  Patent      Post Op Vitals Reviewed: Yes      Staff: CRNA         No notable events documented.     BP   126/74   Temp 97.6   Pulse 75   Resp 14   SpO2 99

## 2023-09-25 NOTE — ANESTHESIA PREPROCEDURE EVALUATION
Procedure:  COLONOSCOPY    Relevant Problems   CARDIO   (+) Essential hypertension   (+) Pure hypercholesterolemia      ENDO   (+) Acquired hypothyroidism      GI/HEPATIC   (+) Gastroesophageal reflux disease without esophagitis      PULMONARY (within normal limits)   (-) Sleep apnea   (-) Smoking   (-) URI (upper respiratory infection)      Respiratory   (+) Seasonal allergic rhinitis      Endocrine   (+) IFG (impaired fasting glucose)      Physical Exam    Airway    Mallampati score: I  TM Distance: >3 FB  Neck ROM: full     Dental   Comment: Upper front partial, secure, pt sleeps with it in place and elects to leave in today,     Cardiovascular      Pulmonary      Other Findings       Lab Results   Component Value Date    WBC 6.44 06/07/2023    HGB 12.8 06/07/2023     06/07/2023     Lab Results   Component Value Date    SODIUM 141 07/28/2023    K 4.5 07/28/2023    BUN 22 07/28/2023    CREATININE 1.10 07/28/2023    EGFR 65 07/28/2023     Lab Results   Component Value Date    HGBA1C 5.7 (H) 06/07/2023     Anesthesia Plan  ASA Score- 2     Anesthesia Type- IV sedation with anesthesia with ASA Monitors. Additional Monitors:   Airway Plan:           Plan Factors-Exercise tolerance (METS): >4 METS. Chart reviewed. Existing labs reviewed. Patient summary reviewed. Patient is not a current smoker. Induction- intravenous. Postoperative Plan-     Informed Consent- Anesthetic plan and risks discussed with patient. I personally reviewed this patient with the CRNA. Discussed and agreed on the Anesthesia Plan with the CRNA. Carolina Rinaldi

## 2023-09-25 NOTE — H&P
History and Physical - [facility]  Yue Barrett 74 y.o. male MRN: [id number]    HPI: Yue Barrett is a 74y.o. year old male who presents for colonoscopy secondary to personal history of polyps    REVIEW OF SYSTEMS: Per the HPI, and otherwise unremarkable.     Historical Information   Past Medical History:   Diagnosis Date   • Allergic 2015    spring/   • Arthritis    • Disease of thyroid gland    • Hypertension    • Kidney stone 2010    one time   • Sleep disorder      Past Surgical History:   Procedure Laterality Date   • CHOLECYSTECTOMY     • ELBOW SURGERY Left     bursa removed   • EYE SURGERY  cataract removal/lens implant       • JOINT REPLACEMENT     • KNEE SURGERY     • LEG WOUND REPAIR / CLOSURE       Social History   Social History     Substance and Sexual Activity   Alcohol Use Yes   • Alcohol/week: 8.0 standard drinks of alcohol   • Types: 7 Glasses of wine, 1 Shots of liquor per week     Social History     Substance and Sexual Activity   Drug Use No     Social History     Tobacco Use   Smoking Status Former   • Packs/day: 0.00   • Types: Cigarettes, Pipe, Cigars   • Start date:    • Quit date: 1983   • Years since quittin.7   Smokeless Tobacco Never   Tobacco Comments    quit in      Family History   Problem Relation Age of Onset   • Alcohol abuse Brother    • Depression Brother    • Breast cancer Mother    • Cancer Mother    • Heart disease Father    • Stroke Brother    • Heart disease Brother    • Heart disease Brother    • Arthritis Brother    • Heart disease Brother    • Heart disease Brother    • Thyroid disease Daughter    • Asthma Daughter    • Thyroid disease Daughter    • Autoimmune disease Sister    • Breast cancer Sister    • Mental illness Neg Hx        Meds/Allergies       Current Outpatient Medications:   •  Ascorbic Acid (vitamin C) 1000 MG tablet  •  Cholecalciferol (Vitamin D3) 1.25 MG (84719 UT) CAPS  •  ELDERBERRY PO  •  famotidine (PEPCID) 20 mg tablet  •  levothyroxine (Euthyrox) 150 mcg tablet  •  losartan (COZAAR) 100 MG tablet  •  polyethylene glycol (GOLYTELY) 4000 mL solution  •  traZODone (DESYREL) 150 mg tablet  •  vitamin B-12 (VITAMIN B-12) 500 mcg tablet  •  albuterol (PROVENTIL HFA,VENTOLIN HFA) 90 mcg/act inhaler  •  amoxicillin (AMOXIL) 500 mg capsule  •  fluorouracil (EFUDEX) 5 % cream  •  simvastatin (ZOCOR) 20 mg tablet    Current Facility-Administered Medications:   •  lactated ringers infusion, 50 mL/hr, Intravenous, Continuous, 50 mL/hr at 09/25/23 0947    No Known Allergies    Objective     /78   Pulse 85   Temp 98 °F (36.7 °C) (Temporal)   Resp 16   Ht 5' 7" (1.702 m)   Wt 85.3 kg (188 lb)   SpO2 97%   BMI 29.44 kg/m²     PHYSICAL EXAM    Gen: NAD AAOx3  Head: Normocephalic, Atraumatic  CV: S1S2 RRR no m/r/g  CHEST: Clear b/l no c/r/w  ABD: soft, +BS NT/ND  EXT: no edema    ASSESSMENT/PLAN:  This is a 74y.o. year old male here for colonoscopy secondary to personal history of polyps, and he is stable and optimized for his procedure.

## 2023-12-01 DIAGNOSIS — E03.9 ACQUIRED HYPOTHYROIDISM: ICD-10-CM

## 2023-12-01 RX ORDER — LEVOTHYROXINE SODIUM 0.15 MG/1
150 TABLET ORAL
Qty: 90 TABLET | Refills: 0 | Status: SHIPPED | OUTPATIENT
Start: 2023-12-01

## 2023-12-01 RX ORDER — TRAZODONE HYDROCHLORIDE 150 MG/1
150 TABLET ORAL
Qty: 45 TABLET | Refills: 0 | Status: SHIPPED | OUTPATIENT
Start: 2023-12-01

## 2023-12-18 ENCOUNTER — RA CDI HCC (OUTPATIENT)
Dept: OTHER | Facility: HOSPITAL | Age: 73
End: 2023-12-18

## 2023-12-20 ENCOUNTER — TELEPHONE (OUTPATIENT)
Dept: ADMINISTRATIVE | Facility: OTHER | Age: 73
End: 2023-12-20

## 2023-12-20 NOTE — TELEPHONE ENCOUNTER
12/20/23 2:27 PM    Patient contacted (left message) to bring Advance Directive, POLST, or Living Will document to next scheduled pcp visit.    Thank you.  Sonja Breen [facility]

## 2023-12-21 ENCOUNTER — TELEPHONE (OUTPATIENT)
Dept: FAMILY MEDICINE CLINIC | Facility: HOSPITAL | Age: 73
End: 2023-12-21

## 2023-12-22 ENCOUNTER — APPOINTMENT (OUTPATIENT)
Dept: LAB | Facility: HOSPITAL | Age: 73
End: 2023-12-22
Payer: MEDICARE

## 2023-12-22 DIAGNOSIS — I10 ESSENTIAL HYPERTENSION: ICD-10-CM

## 2023-12-22 DIAGNOSIS — E03.9 ACQUIRED HYPOTHYROIDISM: ICD-10-CM

## 2023-12-22 DIAGNOSIS — Z12.5 SCREENING FOR PROSTATE CANCER: ICD-10-CM

## 2023-12-22 DIAGNOSIS — R73.01 IFG (IMPAIRED FASTING GLUCOSE): Primary | ICD-10-CM

## 2023-12-22 LAB — PSA SERPL-MCNC: 1.44 NG/ML (ref 0–4)

## 2023-12-22 PROCEDURE — 36415 COLL VENOUS BLD VENIPUNCTURE: CPT

## 2023-12-22 PROCEDURE — G0103 PSA SCREENING: HCPCS

## 2023-12-26 ENCOUNTER — APPOINTMENT (OUTPATIENT)
Dept: LAB | Facility: HOSPITAL | Age: 73
End: 2023-12-26
Payer: MEDICARE

## 2023-12-26 DIAGNOSIS — R73.01 IFG (IMPAIRED FASTING GLUCOSE): ICD-10-CM

## 2023-12-26 DIAGNOSIS — I10 ESSENTIAL HYPERTENSION: ICD-10-CM

## 2023-12-26 DIAGNOSIS — E03.9 ACQUIRED HYPOTHYROIDISM: ICD-10-CM

## 2023-12-26 LAB
ALBUMIN SERPL BCP-MCNC: 4.3 G/DL (ref 3.5–5)
ALP SERPL-CCNC: 88 U/L (ref 34–104)
ALT SERPL W P-5'-P-CCNC: 15 U/L (ref 7–52)
ANION GAP SERPL CALCULATED.3IONS-SCNC: 9 MMOL/L
AST SERPL W P-5'-P-CCNC: 18 U/L (ref 13–39)
BILIRUB SERPL-MCNC: 0.35 MG/DL (ref 0.2–1)
BUN SERPL-MCNC: 24 MG/DL (ref 5–25)
CALCIUM SERPL-MCNC: 9.8 MG/DL (ref 8.4–10.2)
CHLORIDE SERPL-SCNC: 107 MMOL/L (ref 96–108)
CO2 SERPL-SCNC: 24 MMOL/L (ref 21–32)
CREAT SERPL-MCNC: 1.12 MG/DL (ref 0.6–1.3)
EST. AVERAGE GLUCOSE BLD GHB EST-MCNC: 134 MG/DL
GFR SERPL CREATININE-BSD FRML MDRD: 63 ML/MIN/1.73SQ M
GLUCOSE P FAST SERPL-MCNC: 118 MG/DL (ref 65–99)
HBA1C MFR BLD: 6.3 %
POTASSIUM SERPL-SCNC: 4.6 MMOL/L (ref 3.5–5.3)
PROT SERPL-MCNC: 7.3 G/DL (ref 6.4–8.4)
SODIUM SERPL-SCNC: 140 MMOL/L (ref 135–147)
TSH SERPL DL<=0.05 MIU/L-ACNC: 3.13 UIU/ML (ref 0.45–4.5)

## 2023-12-26 PROCEDURE — 36415 COLL VENOUS BLD VENIPUNCTURE: CPT

## 2023-12-26 PROCEDURE — 83036 HEMOGLOBIN GLYCOSYLATED A1C: CPT

## 2023-12-26 PROCEDURE — 80053 COMPREHEN METABOLIC PANEL: CPT

## 2023-12-26 PROCEDURE — 84443 ASSAY THYROID STIM HORMONE: CPT

## 2023-12-27 ENCOUNTER — OFFICE VISIT (OUTPATIENT)
Dept: FAMILY MEDICINE CLINIC | Facility: HOSPITAL | Age: 73
End: 2023-12-27
Payer: MEDICARE

## 2023-12-27 VITALS
HEIGHT: 67 IN | HEART RATE: 74 BPM | SYSTOLIC BLOOD PRESSURE: 124 MMHG | OXYGEN SATURATION: 97 % | BODY MASS INDEX: 29.51 KG/M2 | DIASTOLIC BLOOD PRESSURE: 68 MMHG | WEIGHT: 188 LBS

## 2023-12-27 DIAGNOSIS — E03.9 ACQUIRED HYPOTHYROIDISM: ICD-10-CM

## 2023-12-27 DIAGNOSIS — R73.01 IFG (IMPAIRED FASTING GLUCOSE): Primary | ICD-10-CM

## 2023-12-27 DIAGNOSIS — E78.00 PURE HYPERCHOLESTEROLEMIA: ICD-10-CM

## 2023-12-27 DIAGNOSIS — G47.09 OTHER INSOMNIA: ICD-10-CM

## 2023-12-27 DIAGNOSIS — K21.9 GASTROESOPHAGEAL REFLUX DISEASE WITHOUT ESOPHAGITIS: ICD-10-CM

## 2023-12-27 DIAGNOSIS — I10 ESSENTIAL HYPERTENSION: ICD-10-CM

## 2023-12-27 PROCEDURE — 99214 OFFICE O/P EST MOD 30 MIN: CPT | Performed by: INTERNAL MEDICINE

## 2023-12-27 RX ORDER — LOSARTAN POTASSIUM 100 MG/1
100 TABLET ORAL DAILY
Qty: 90 TABLET | Refills: 3 | Status: SHIPPED | OUTPATIENT
Start: 2023-12-27

## 2023-12-27 RX ORDER — FAMOTIDINE 20 MG/1
20 TABLET, FILM COATED ORAL 2 TIMES DAILY
Qty: 180 TABLET | Refills: 3 | Status: SHIPPED | OUTPATIENT
Start: 2023-12-27

## 2023-12-27 RX ORDER — TRAZODONE HYDROCHLORIDE 150 MG/1
150 TABLET ORAL
Qty: 45 TABLET | Refills: 3 | Status: SHIPPED | OUTPATIENT
Start: 2023-12-27

## 2023-12-27 RX ORDER — LEVOTHYROXINE SODIUM 0.15 MG/1
150 TABLET ORAL
Qty: 90 TABLET | Refills: 3 | Status: SHIPPED | OUTPATIENT
Start: 2023-12-27

## 2023-12-27 RX ORDER — SIMVASTATIN 20 MG
20 TABLET ORAL
Qty: 90 TABLET | Refills: 3 | Status: SHIPPED | OUTPATIENT
Start: 2023-12-27

## 2023-12-27 NOTE — PROGRESS NOTES
Assessment/Plan:    Essential hypertension  Blood pressure is stable.  Hypertension is controlled.  Electrolytes and renal function were stable this month.  Continue losartan 100 mg daily    Recheck electrolytes and renal function in 6 months    Acquired hypothyroidism  TSH was normal this month, continue levothyroxine 150 mcg in the morning.  Recheck TSH in 6 months    Gastroesophageal reflux disease without esophagitis  GERD is controlled.  Patient avoids tomato products.  Denies signs of GI bleeding.  Continue Pepcid 20 mg twice a day    IFG (impaired fasting glucose)  Patient's fasting blood sugar increased and he is approaching diabetes.  We discussed decreasing carb intake and increasing physical activity to lower blood sugars.  I will recheck his A1c in 6 months    Other insomnia  He has insomnia and he takes trazodone 150 mg at bedtime.  He feels that his sleep is improved with trazodone.  Continue trazodone    Pure hypercholesterolemia  Denies myalgias.  Continue Zocor, recheck cholesterol in 6 months       Diagnoses and all orders for this visit:    IFG (impaired fasting glucose)  -     Hemoglobin A1C; Future  -     Albumin / creatinine urine ratio; Future    Essential hypertension  -     losartan (COZAAR) 100 MG tablet; Take 1 tablet (100 mg total) by mouth daily  -     Comprehensive metabolic panel; Future  -     CBC; Future    Acquired hypothyroidism  -     levothyroxine (Euthyrox) 150 mcg tablet; Take 1 tablet (150 mcg total) by mouth daily in the early morning  -     TSH, 3rd generation with Free T4 reflex; Future    Gastroesophageal reflux disease without esophagitis  -     famotidine (PEPCID) 20 mg tablet; Take 1 tablet (20 mg total) by mouth 2 (two) times a day    Other insomnia  -     traZODone (DESYREL) 150 mg tablet; Take 1 tablet (150 mg total) by mouth daily at bedtime    Pure hypercholesterolemia  -     simvastatin (ZOCOR) 20 mg tablet; Take 1 tablet (20 mg total) by mouth daily at bedtime  -  "    Lipid panel; Future          Subjective:      Patient ID: Jose Garcia is a 73 y.o. male.      HPI    Patient presents for follow-up of chronic conditions as detailed in the assessment and plan.      The following portions of the patient's history were reviewed and updated as appropriate: current medications, past family history, past medical history, past social history, past surgical history and problem list.    Review of Systems   Musculoskeletal:         Complains of arthralgias in the shoulders         Objective:    /68   Pulse 74   Ht 5' 7\" (1.702 m)   Wt 85.3 kg (188 lb)   SpO2 97%   BMI 29.44 kg/m²      Physical Exam  Constitutional:       General: He is not in acute distress.     Appearance: He is not toxic-appearing.   Cardiovascular:      Rate and Rhythm: Normal rate and regular rhythm.      Heart sounds: No murmur heard.  Pulmonary:      Effort: No respiratory distress.      Breath sounds: No wheezing or rales.   Musculoskeletal:         General: No swelling or tenderness.      Comments: He has full range of motion in both shoulders, he has no evidence of crepitus on range of motion, I advised patient to resume shoulder exercises   Neurological:      Mental Status: He is alert.             Michel Jarrell MD  "

## 2023-12-27 NOTE — ASSESSMENT & PLAN NOTE
GERD is controlled.  Patient avoids tomato products.  Denies signs of GI bleeding.  Continue Pepcid 20 mg twice a day

## 2023-12-27 NOTE — ASSESSMENT & PLAN NOTE
TSH was normal this month, continue levothyroxine 150 mcg in the morning.  Recheck TSH in 6 months

## 2023-12-27 NOTE — ASSESSMENT & PLAN NOTE
Patient's fasting blood sugar increased and he is approaching diabetes.  We discussed decreasing carb intake and increasing physical activity to lower blood sugars.  I will recheck his A1c in 6 months

## 2023-12-27 NOTE — ASSESSMENT & PLAN NOTE
He has insomnia and he takes trazodone 150 mg at bedtime.  He feels that his sleep is improved with trazodone.  Continue trazodone

## 2023-12-27 NOTE — ASSESSMENT & PLAN NOTE
Blood pressure is stable.  Hypertension is controlled.  Electrolytes and renal function were stable this month.  Continue losartan 100 mg daily    Recheck electrolytes and renal function in 6 months

## 2024-02-28 ENCOUNTER — TELEPHONE (OUTPATIENT)
Dept: FAMILY MEDICINE CLINIC | Facility: HOSPITAL | Age: 74
End: 2024-02-28

## 2024-02-28 DIAGNOSIS — Z96.653 S/P TOTAL KNEE REPLACEMENT, BILATERAL: Primary | ICD-10-CM

## 2024-02-28 DIAGNOSIS — Z96.653 HISTORY OF TOTAL BILATERAL KNEE REPLACEMENT (TKR): Primary | ICD-10-CM

## 2024-02-28 RX ORDER — AMOXICILLIN 500 MG/1
1000 CAPSULE ORAL DAILY PRN
Qty: 2 CAPSULE | Refills: 2 | Status: SHIPPED | OUTPATIENT
Start: 2024-02-28 | End: 2024-03-05

## 2024-02-28 NOTE — TELEPHONE ENCOUNTER
Hi, this is Salas Schmidt. The last name is NOEMI. Birth date March 7th 1950 and I'm a patient of Doctor Aldridge. I have. I've had a cold for almost a week but it's starting to move into my chest and I'm coughing up a lot of green stuff and this happens to me every year and a half or so. And as soon as I see that I have to get a antibiotic or it turns into a whole big deal. So I was wondering if he could possibly prescribe an antibiotic for me. I'm not in [place] right now. I'm in [place]. And if you could, I would very much appreciate it. And if that could be phoned in to [facility] at [phone], again that's [phone]. I've coughing a lot all night long, but it just start getting some productive stuff, so I'd like to get the antibiotic. I do have a prescription for benzene benzonatate, I think they're called Tessalon pearls, which usually is a good cough medicine for me and I can renew that. So if he could call in an antibiotic, I would appreciate it. And if you could just give me a call back at some point. My number here is [phone]. Alright, thanks very much.

## 2024-02-28 NOTE — TELEPHONE ENCOUNTER
Per voicemail @ 1:16p    Hi, my name is Salas Schmidt. The last name is Radha. I left a message this morning a little after 8 requesting to get a prescription for antibiotic. I have not heard back. I've tried to call multiple times since then and the message system just either cuts the call off or ask me to leave another message. So I'm trying again. I'm doctor versus patient. I've had a cold and post nasal drip and it's getting into my chest a little bit and I'm having a productive cough with a lot of gooey green stuff. This happens about every year or so and as long as I get an antibiotic and prevent it from becoming full blown respiratory infection. So I would really like to get an antibiotic prescribed. I wish someone would at least acknowledge the message or get back to me. I've tried everything on your menu as far as getting through and sometimes I get to cut off, other times I'm just called to leave a message so if someone could I would appreciate it. My phone number is [phone]. At least let me know that you have the message or what I might be able to do. And what I'm requesting is anybody I'd [facility]. I'm not home right now. I'm in [place] and [facility]'s a [facility]. The phone number there is [phone], but I would really appreciate some kind of a call back. Thank you.

## 2024-03-06 NOTE — TELEPHONE ENCOUNTER
SPOKE TO PT,  HE WENT TO AN URGENT CARE.  HE WANTED ABX AND I SAID HE DOESN'T RX THEM OVER PHONE AND HE WOULD HAVE TO COME IN.  BUT SINCE IN [facility] --HE WOULD HAVE TO GO TO URG. CARE.   PT SAID IT WOULD HAVE BEEN A RENEWAL ON ABX, I TRIED TO EXPLAIN FURTHER TO HIM, BUT HE COULDN'T TALK AND SAID HE HAD TO GO.  PT IS BETTER NOW .

## 2024-05-19 ENCOUNTER — OFFICE VISIT (OUTPATIENT)
Dept: URGENT CARE | Facility: CLINIC | Age: 74
End: 2024-05-19
Payer: MEDICARE

## 2024-05-19 ENCOUNTER — APPOINTMENT (OUTPATIENT)
Dept: RADIOLOGY | Facility: CLINIC | Age: 74
End: 2024-05-19
Payer: MEDICARE

## 2024-05-19 VITALS
HEART RATE: 75 BPM | WEIGHT: 186 LBS | TEMPERATURE: 97.5 F | RESPIRATION RATE: 18 BRPM | BODY MASS INDEX: 29.19 KG/M2 | HEIGHT: 67 IN | SYSTOLIC BLOOD PRESSURE: 128 MMHG | OXYGEN SATURATION: 97 % | DIASTOLIC BLOOD PRESSURE: 58 MMHG

## 2024-05-19 DIAGNOSIS — M25.531 ARTHRALGIA OF RIGHT WRIST: Primary | ICD-10-CM

## 2024-05-19 DIAGNOSIS — M25.531 RIGHT WRIST PAIN: ICD-10-CM

## 2024-05-19 PROCEDURE — G0463 HOSPITAL OUTPT CLINIC VISIT: HCPCS

## 2024-05-19 PROCEDURE — 73110 X-RAY EXAM OF WRIST: CPT

## 2024-05-19 PROCEDURE — 99213 OFFICE O/P EST LOW 20 MIN: CPT

## 2024-05-19 NOTE — PROGRESS NOTES
[facility]        NAME: Jose Garcia is a 74 y.o. male  : 1950    MRN: [id number]  DATE: May 19, 2024  TIME: 1:36 PM    Assessment and Plan   Arthralgia of right wrist [M25.531]  1. Arthralgia of right wrist  XR wrist 3+ vw right    Ambulatory Referral to Orthopedic Surgery            Patient Instructions     Your x-rays were read by the provider. A radiologist will also read the x-rays and you will be notified of any abnormalities.      Continue Tylenol and Motrin/Advil/Aleve for pain relief.     Can try Icy Hot cream or Voltaren gel.      Follow-up with your PCP or ortho for continued pain.     Go to the ED for any worsening symptoms.    If tests are performed, our office will contact you with results only if changes need to made to the care plan discussed with you at the visit. [facility]      Chief Complaint     Chief Complaint   Patient presents with    Wrist Pain     Pt states that he has a history of right wrist pain for a few years due to arthritis. For the last three days, the pain has gotten more intense. Pt states that in late February he accidentally hit his right wrist against his vanity while he was reaching for his hair fatou. At the time he noticed R shoulder pain, but no wrist pain. Pt is not sure if the wrist pain now is a delayed reaction. Pt states pain level is about an 8 and constant.           History of Present Illness       74-year-old male who presents for evaluation of right wrist pain.  Patient reports known arthritis in his right hand and wrist however states pain has gotten more intense over the past 2-3 days.  States he did bang his right wrist against a vanity back in February.  No recent injury.  Has difficulty bending and rotating his wrist due to stiffness.  The pain does wake him up from sleep at night.  No numbness or tingling.  Taking Tylenol and Motrin for pain relief.  Also wearing a prefabricated supportive  brace periodically.        Review of Systems   Review of Systems   Constitutional:  Negative for chills and fever.   Respiratory:  Negative for chest tightness and shortness of breath.    Cardiovascular:  Negative for chest pain and palpitations.   Musculoskeletal:  Positive for arthralgias (right wrist) and joint swelling.   Skin:  Negative for color change, rash and wound.   Neurological:  Negative for dizziness, light-headedness and headaches.         Current Medications       Current Outpatient Medications:     Ascorbic Acid (vitamin C) 1000 MG tablet, Take 1,000 mg by mouth daily, Disp: , Rfl:     Cholecalciferol (Vitamin D3) 1.25 MG (39939 UT) CAPS, Take 1.25 mg by mouth in the morning Vitamin D3 1.25 MG (21094XL) CAPS, Disp: , Rfl:     ELDERBERRY PO, Take 1 tablet by mouth daily, Disp: , Rfl:     famotidine (PEPCID) 20 mg tablet, Take 1 tablet (20 mg total) by mouth 2 (two) times a day, Disp: 180 tablet, Rfl: 3    fluorouracil (EFUDEX) 5 % cream, APPLY CREAM TO THE SCALP TWICE DAILY FOR 2 WEEKS. KEEP OUT OF REACH OF CHILDREN/PETS. WILL CAUSE REDNESS AND CRUSTING., Disp: , Rfl:     levothyroxine (Euthyrox) 150 mcg tablet, Take 1 tablet (150 mcg total) by mouth daily in the early morning, Disp: 90 tablet, Rfl: 3    losartan (COZAAR) 100 MG tablet, Take 1 tablet (100 mg total) by mouth daily, Disp: 90 tablet, Rfl: 3    simvastatin (ZOCOR) 20 mg tablet, Take 1 tablet (20 mg total) by mouth daily at bedtime, Disp: 90 tablet, Rfl: 3    traZODone (DESYREL) 150 mg tablet, Take 1 tablet (150 mg total) by mouth daily at bedtime, Disp: 45 tablet, Rfl: 3    vitamin B-12 (VITAMIN B-12) 500 mcg tablet, Take 2 tablets (1,000 mcg total) by mouth daily, Disp: 180 tablet, Rfl: 1    Current Allergies     Allergies as of 05/19/2024    (No Known Allergies)            The following portions of the patient's history were reviewed and updated as appropriate: allergies, current medications, past family history, past medical history,  "past social history, past surgical history and problem list.     Past Medical History:   Diagnosis Date    Allergic 2015    spring/fall    Arthritis 2005    Disease of thyroid gland     Hypertension     Kidney stone 2010    one time    Sleep disorder        Past Surgical History:   Procedure Laterality Date    CHOLECYSTECTOMY      ELBOW SURGERY Left     bursa removed    EYE SURGERY  cataract removal/lens implant    2018    JOINT REPLACEMENT      KNEE SURGERY      LEG WOUND REPAIR / CLOSURE         Family History   Problem Relation Age of Onset    Alcohol abuse Brother     Depression Brother     Breast cancer Mother     Cancer Mother     Heart disease Father     Stroke Brother     Heart disease Brother     Heart disease Brother     Arthritis Brother     Heart disease Brother     Heart disease Brother     Thyroid disease Daughter     Asthma Daughter     Thyroid disease Daughter     Autoimmune disease Sister     Breast cancer Sister     Mental illness Neg Hx          Medications have been verified.        Objective   /58   Pulse 75   Temp 97.5 °F (36.4 °C)   Resp 18   Ht 5' 7\" (1.702 m)   Wt 84.4 kg (186 lb)   SpO2 97%   BMI 29.13 kg/m²        Physical Exam     Physical Exam  Vitals and nursing note reviewed.   Constitutional:       General: He is not in acute distress.     Appearance: He is not ill-appearing.   HENT:      Head: Normocephalic and atraumatic.      Right Ear: External ear normal.      Left Ear: External ear normal.      Nose: Nose normal.      Mouth/Throat:      Mouth: Mucous membranes are moist.      Pharynx: Oropharynx is clear.   Eyes:      Conjunctiva/sclera: Conjunctivae normal.      Pupils: Pupils are equal, round, and reactive to light.   Cardiovascular:      Rate and Rhythm: Normal rate.   Pulmonary:      Effort: Pulmonary effort is normal.   Musculoskeletal:      Right forearm: Normal.      Right wrist: Swelling and tenderness present. No snuff box tenderness or crepitus. Decreased " range of motion. Normal pulse.      Right hand: Swelling and tenderness present. Decreased range of motion. Normal strength. Normal sensation. Normal capillary refill.      Cervical back: Normal range of motion and neck supple.   Skin:     General: Skin is warm and dry.      Capillary Refill: Capillary refill takes less than 2 seconds.   Neurological:      Mental Status: He is alert and oriented to person, place, and time.

## 2024-05-19 NOTE — PATIENT INSTRUCTIONS
Your x-rays were read by the provider. A radiologist will also read the x-rays and you will be notified of any abnormalities.      Continue Tylenol and Motrin/Advil/Aleve for pain relief.     Can try Icy Hot cream or Voltaren gel.      Follow-up with your PCP or ortho for continued pain.     Go to the ED for any worsening symptoms.

## 2024-05-20 ENCOUNTER — OFFICE VISIT (OUTPATIENT)
Dept: OBGYN CLINIC | Facility: CLINIC | Age: 74
End: 2024-05-20
Payer: MEDICARE

## 2024-05-20 VITALS
HEIGHT: 67 IN | DIASTOLIC BLOOD PRESSURE: 70 MMHG | WEIGHT: 186 LBS | BODY MASS INDEX: 29.19 KG/M2 | SYSTOLIC BLOOD PRESSURE: 144 MMHG

## 2024-05-20 DIAGNOSIS — M25.531 ARTHRALGIA OF RIGHT WRIST: ICD-10-CM

## 2024-05-20 DIAGNOSIS — M18.11 PRIMARY OSTEOARTHRITIS OF FIRST CARPOMETACARPAL JOINT OF RIGHT HAND: Primary | ICD-10-CM

## 2024-05-20 PROCEDURE — 99204 OFFICE O/P NEW MOD 45 MIN: CPT | Performed by: FAMILY MEDICINE

## 2024-05-20 RX ORDER — METHYLPREDNISOLONE 4 MG/1
TABLET ORAL
Qty: 21 TABLET | Refills: 0 | Status: SHIPPED | OUTPATIENT
Start: 2024-05-20

## 2024-05-20 NOTE — PATIENT INSTRUCTIONS
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 As such, I recommended consultation for possible surgical intervention with hand surgeon. We will avoid CSI today due to possible pending surgery. In the interim, I provided patient with oral steroid pack for relief.

## 2024-05-20 NOTE — PROGRESS NOTES
1. Primary osteoarthritis of first carpometacarpal joint of right hand  methylPREDNISolone 4 MG tablet therapy pack    Ambulatory Referral to Orthopedic Surgery      2. 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    Patient instructions below verbally summarized in person during encounter:  Patient Instructions   I explained to patient that he has severe end stage 1st CMC OA with collapse of the trapezium bone. As such, I recommended consultation for possible surgical intervention with hand surgeon. We will avoid CSI today due to possible pending surgery. In the interim, I provided patient with oral steroid pack for relief.       __________________________________________________________________________    HISTORY OF PRESENT ILLNESS:  Chronic wrist pain 1st CMC OA. Last treated 2019 Dr Ernie GARCIASG CSI. Now worse in the past 3 days. Denies any injury. Recent xray reveals collapse of trapezium.         Review of Systems      Following history reviewed and update:    Past Medical History:   Diagnosis Date    Allergic 2015    spring/fall    Arthritis 2005    Disease of thyroid gland     Hypertension     Kidney stone 2010    one time    Sleep disorder      Past Surgical History:   Procedure Laterality Date    CHOLECYSTECTOMY      ELBOW SURGERY Left     bursa removed    EYE SURGERY  cataract removal/lens implant    2018    JOINT REPLACEMENT      KNEE SURGERY      LEG WOUND REPAIR / CLOSURE       Social History  "  Social History     Substance and Sexual Activity   Alcohol Use Yes    Alcohol/week: 8.0 standard drinks of alcohol    Types: 7 Glasses of wine, 1 Shots of liquor per week     Social History     Substance and Sexual Activity   Drug Use No     Social History     Tobacco Use   Smoking Status Former    Current packs/day: 0.00    Types: Cigarettes, Pipe, Cigars    Quit date:     Years since quittin.4   Smokeless Tobacco Never   Tobacco Comments    quit in      Family History   Problem Relation Age of Onset    Alcohol abuse Brother     Depression Brother     Breast cancer Mother     Cancer Mother     Heart disease Father     Stroke Brother     Heart disease Brother     Heart disease Brother     Arthritis Brother     Heart disease Brother     Heart disease Brother     Thyroid disease Daughter     Asthma Daughter     Thyroid disease Daughter     Autoimmune disease Sister     Breast cancer Sister     Mental illness Neg Hx      No Known Allergies       Physical Exam  /70 (BP Location: Left arm, Patient Position: Sitting, Cuff Size: Standard)   Ht 5' 7\" (1.702 m)   Wt 84.4 kg (186 lb)   BMI 29.13 kg/m²         Ortho Exam  Right Elbow:  rom full  nontender  no laxity of joint    Right Wrist  no erythema or increased warmth  +mild to mod swelling   rom diminished  +diffuse nonspecific but worst radial    Right Hand  no erythema  swelling: n  tenderness: n  sensation intact  __________________________________________________________________________  Procedures                  "

## 2024-05-22 ENCOUNTER — OFFICE VISIT (OUTPATIENT)
Dept: OBGYN CLINIC | Facility: HOSPITAL | Age: 74
End: 2024-05-22
Payer: MEDICARE

## 2024-05-22 VITALS — WEIGHT: 186 LBS | BODY MASS INDEX: 29.19 KG/M2 | HEIGHT: 67 IN

## 2024-05-22 DIAGNOSIS — M19.131 SLAC (SCAPHOLUNATE ADVANCED COLLAPSE) OF WRIST, RIGHT: ICD-10-CM

## 2024-05-22 DIAGNOSIS — M18.11 PRIMARY OSTEOARTHRITIS OF FIRST CARPOMETACARPAL JOINT OF RIGHT HAND: Primary | ICD-10-CM

## 2024-05-22 PROCEDURE — 99204 OFFICE O/P NEW MOD 45 MIN: CPT | Performed by: ORTHOPAEDIC SURGERY

## 2024-05-22 PROCEDURE — 20600 DRAIN/INJ JOINT/BURSA W/O US: CPT | Performed by: ORTHOPAEDIC SURGERY

## 2024-05-22 PROCEDURE — 20605 DRAIN/INJ JOINT/BURSA W/O US: CPT | Performed by: ORTHOPAEDIC SURGERY

## 2024-05-22 RX ADMIN — BETAMETHASONE SODIUM PHOSPHATE AND BETAMETHASONE ACETATE 6 MG: 3; 3 INJECTION, SUSPENSION INTRA-ARTICULAR; INTRALESIONAL; INTRAMUSCULAR; SOFT TISSUE at 15:15

## 2024-05-22 RX ADMIN — LIDOCAINE HYDROCHLORIDE 0.5 ML: 10 INJECTION, SOLUTION EPIDURAL; INFILTRATION; INTRACAUDAL; PERINEURAL at 15:15

## 2024-05-22 RX ADMIN — LIDOCAINE HYDROCHLORIDE 1 ML: 5 INJECTION, SOLUTION INFILTRATION; PERINEURAL at 15:15

## 2024-05-22 NOTE — PROGRESS NOTES
ASSESSMENT/PLAN:    Assessment:   GOUT  Right CMC OA  Right SLAC wrist    Plan:   Patient's description of the symptoms prior to Medrol dose pasha are consistent with GOUT.  Explained that since he is currently taking a Medrol dose pasha I would not recommend ordering an Uric Acid level.   Discussed with an acute gouty flare with underlying OA has caused his significant pain level.   Patient provided with right CMC and radiocarpal injections today, tolerated well.   Activities as tolerated    Follow Up:  3 mos    To Do Next Visit:  Recheck     General Discussions:  CMC Arthritis: The anatomy and physiology of carpometacarpal joint arthritis was discussed with the patient today in the office.  Deterioration of the articular cartilage eventually leads to hypermobility at the thumb CMC joint, resulting in joint subluxation, osteophyte formation, cystic changes within the trapezium and base of the first metacarpal, as well as subchondral sclerosis.  Eventually, pain, limited mobility, and compensatory hyperextension at the metacarpophalangeal joint may develop.  While normal activity and usage of the thumb joint may provide a painful experience to the patient, this typically does not result in damage to the thumb or hand.  Treatment options include resting thumb spica splints to decreased joint edema, pain, and inflammation.  Therapy exercises to strengthen the thenar musculature may relieve pain, but do not alter the overall continued development of osteoarthritis.  Oral medications, topical medications, corticosteroid injections may decrease pain and increase overall function.  Eventually, approximately 5% of patients may require surgical intervention.         _____________________________________________________  CHIEF COMPLAINT:  Chief Complaint   Patient presents with    Right Thumb - Pain     XR - 5/19/24         SUBJECTIVE:  Jose Garcia is a 74 y.o. male who presents with right thumb pain.  Patient was last seen  in 2019 for CMC osteoarthritis and provided with a CS injection.  He recently saw Dr. Castillo who provided the patient with a Medrol dose pasha and referred him here today.  Patient reports on 24 he was lifting a 5 lb bag of ice and experienced a sharp shooting pain.  He reports diffuse thumb and wrist pain.  He notes difficulty making a fist.   Patient reports he had diffuse erythema and swelling a few days ago and was sensitive to light touch which improved with the medrol dose pasha.  He denies any hx of GOUT    PAST MEDICAL HISTORY:  Past Medical History:   Diagnosis Date    Allergic 2015    spring/    Arthritis 2005    Disease of thyroid gland     Hypertension     Kidney stone 2010    one time    Sleep disorder        PAST SURGICAL HISTORY:  Past Surgical History:   Procedure Laterality Date    CHOLECYSTECTOMY      ELBOW SURGERY Left     bursa removed    EYE SURGERY  cataract removal/lens implant    2018    JOINT REPLACEMENT      KNEE SURGERY      LEG WOUND REPAIR / CLOSURE         FAMILY HISTORY:  Family History   Problem Relation Age of Onset    Alcohol abuse Brother     Depression Brother     Breast cancer Mother     Cancer Mother     Heart disease Father     Stroke Brother     Heart disease Brother     Heart disease Brother     Arthritis Brother     Heart disease Brother     Heart disease Brother     Thyroid disease Daughter     Asthma Daughter     Thyroid disease Daughter     Autoimmune disease Sister     Breast cancer Sister     Mental illness Neg Hx        SOCIAL HISTORY:  Social History     Tobacco Use    Smoking status: Former     Current packs/day: 0.00     Types: Cigarettes, Pipe, Cigars     Quit date:      Years since quittin.4    Smokeless tobacco: Never    Tobacco comments:     quit in    Vaping Use    Vaping status: Never Used   Substance Use Topics    Alcohol use: Yes     Alcohol/week: 8.0 standard drinks of alcohol     Types: 7 Glasses of wine, 1 Shots of liquor per week     "Drug use: No       MEDICATIONS:    Current Outpatient Medications:     Ascorbic Acid (vitamin C) 1000 MG tablet, Take 1,000 mg by mouth daily, Disp: , Rfl:     Cholecalciferol (Vitamin D3) 1.25 MG (21494 UT) CAPS, Take 1.25 mg by mouth in the morning Vitamin D3 1.25 MG (13913MO) CAPS, Disp: , Rfl:     ELDERBERRY PO, Take 1 tablet by mouth daily, Disp: , Rfl:     famotidine (PEPCID) 20 mg tablet, Take 1 tablet (20 mg total) by mouth 2 (two) times a day, Disp: 180 tablet, Rfl: 3    fluorouracil (EFUDEX) 5 % cream, APPLY CREAM TO THE SCALP TWICE DAILY FOR 2 WEEKS. KEEP OUT OF REACH OF CHILDREN/PETS. WILL CAUSE REDNESS AND CRUSTING., Disp: , Rfl:     levothyroxine (Euthyrox) 150 mcg tablet, Take 1 tablet (150 mcg total) by mouth daily in the early morning, Disp: 90 tablet, Rfl: 3    losartan (COZAAR) 100 MG tablet, Take 1 tablet (100 mg total) by mouth daily, Disp: 90 tablet, Rfl: 3    methylPREDNISolone 4 MG tablet therapy pack, Use as directed on package, Disp: 21 tablet, Rfl: 0    simvastatin (ZOCOR) 20 mg tablet, Take 1 tablet (20 mg total) by mouth daily at bedtime, Disp: 90 tablet, Rfl: 3    traZODone (DESYREL) 150 mg tablet, Take 1 tablet (150 mg total) by mouth daily at bedtime, Disp: 45 tablet, Rfl: 3    vitamin B-12 (VITAMIN B-12) 500 mcg tablet, Take 2 tablets (1,000 mcg total) by mouth daily, Disp: 180 tablet, Rfl: 1    ALLERGIES:  No Known Allergies    REVIEW OF SYSTEMS:  Pertinent items are noted in HPI.  A comprehensive review of systems was negative.    LABS:  HgA1c:   Lab Results   Component Value Date    HGBA1C 6.3 (H) 12/26/2023     BMP:   Lab Results   Component Value Date    CALCIUM 9.8 12/26/2023    K 4.6 12/26/2023    CO2 24 12/26/2023     12/26/2023    BUN 24 12/26/2023    CREATININE 1.12 12/26/2023         _____________________________________________________  PHYSICAL EXAMINATION:  Vital signs: Ht 5' 7\" (1.702 m)   Wt 84.4 kg (186 lb)   BMI 29.13 kg/m²   General: well developed and well " nourished, alert, oriented times 3, and appears comfortable  Psychiatric: Normal  HEENT: Trachea Midline, No torticollis  Cardiovascular: No discernable arrhythmia  Pulmonary: No wheezing or stridor  Abdomen: No rebound or guarding  Extremities: No peripheral edema  Skin: No masses, erythema, lacerations, fluctation, ulcerations  Neurovascular: Sensation Intact to the Median, Ulnar, Radial Nerve, Motor Intact to the Median, Ulnar, Radial Nerve, and Pulses Intact    MUSCULOSKELETAL EXAMINATION:  Exam is limited by pain today  Positive TTP radiocarpal joint and CMC joint  Decreased ROM   _____________________________________________________  STUDIES REVIEWED:  Images were reviewed in PACS by Dr. Klein and demonstrate: Right wrist x-rays demonstrates severe CMC OA, collapse of the trapezim and SLAC wrist.       PROCEDURES PERFORMED:  Small joint arthrocentesis: R thumb CMC  [facility]bert Protocol:  Consent: Verbal consent obtained.  Consent given by: patient  Patient understanding: patient states understanding of the procedure being performed  Site marked: the operative site was marked  Patient identity confirmed: verbally with patient  Supporting Documentation  Indications: pain   Procedure Details  Location: thumb - R thumb CMC  Preparation: Patient was prepped and draped in the usual sterile fashion  Needle size: 25 G  Ultrasound guidance: no  Approach: radial  Medications administered: 6 mg betamethasone acetate-betamethasone sodium phosphate 6 (3-3) mg/mL; 0.5 mL lidocaine (PF) 1 %    Patient tolerance: patient tolerated the procedure well with no immediate complications  Dressing:  Sterile dressing applied      Medium joint arthrocentesis: R radiocarpal  Universal Protocol:  Consent: Verbal consent obtained.  Consent given by: patient  Patient understanding: patient states understanding of the procedure being performed  Patient identity confirmed: verbally with patient  Supporting Documentation  Indications: pain    Procedure Details  Location: wrist - R radiocarpal  Needle size: 25 G  Ultrasound guidance: no  Approach: dorsal  Medications administered: 1 mL lidocaine 0.5 %; 6 mg betamethasone acetate-betamethasone sodium phosphate 6 (3-3) mg/mL    Patient tolerance: patient tolerated the procedure well with no immediate complications  Dressing:  Sterile dressing applied        Scribe Attestation      I,:  Debi Blackwell am acting as a scribe while in the presence of the attending physician.:       I,:  Jerzy Klein MD personally performed the services described in this documentation    as scribed in my presence.:

## 2024-05-26 RX ORDER — LIDOCAINE HYDROCHLORIDE 10 MG/ML
0.5 INJECTION, SOLUTION EPIDURAL; INFILTRATION; INTRACAUDAL; PERINEURAL
Status: COMPLETED | OUTPATIENT
Start: 2024-05-22 | End: 2024-05-22

## 2024-05-26 RX ORDER — BETAMETHASONE SODIUM PHOSPHATE AND BETAMETHASONE ACETATE 3; 3 MG/ML; MG/ML
6 INJECTION, SUSPENSION INTRA-ARTICULAR; INTRALESIONAL; INTRAMUSCULAR; SOFT TISSUE
Status: COMPLETED | OUTPATIENT
Start: 2024-05-22 | End: 2024-05-22

## 2024-05-26 RX ORDER — LIDOCAINE HYDROCHLORIDE 5 MG/ML
1 INJECTION, SOLUTION INFILTRATION; PERINEURAL
Status: COMPLETED | OUTPATIENT
Start: 2024-05-22 | End: 2024-05-22

## 2024-06-12 ENCOUNTER — APPOINTMENT (OUTPATIENT)
Dept: LAB | Facility: HOSPITAL | Age: 74
End: 2024-06-12
Payer: MEDICARE

## 2024-06-12 DIAGNOSIS — E03.9 ACQUIRED HYPOTHYROIDISM: ICD-10-CM

## 2024-06-12 DIAGNOSIS — E78.00 PURE HYPERCHOLESTEROLEMIA: ICD-10-CM

## 2024-06-12 DIAGNOSIS — R73.01 IFG (IMPAIRED FASTING GLUCOSE): ICD-10-CM

## 2024-06-12 DIAGNOSIS — I10 ESSENTIAL HYPERTENSION: ICD-10-CM

## 2024-06-12 LAB
ALBUMIN SERPL BCP-MCNC: 4 G/DL (ref 3.5–5)
ALP SERPL-CCNC: 78 U/L (ref 34–104)
ALT SERPL W P-5'-P-CCNC: 14 U/L (ref 7–52)
ANION GAP SERPL CALCULATED.3IONS-SCNC: 11 MMOL/L (ref 4–13)
AST SERPL W P-5'-P-CCNC: 14 U/L (ref 13–39)
BILIRUB SERPL-MCNC: 0.25 MG/DL (ref 0.2–1)
BUN SERPL-MCNC: 22 MG/DL (ref 5–25)
CALCIUM SERPL-MCNC: 9.4 MG/DL (ref 8.4–10.2)
CHLORIDE SERPL-SCNC: 104 MMOL/L (ref 96–108)
CHOLEST SERPL-MCNC: 129 MG/DL
CO2 SERPL-SCNC: 26 MMOL/L (ref 21–32)
CREAT SERPL-MCNC: 0.95 MG/DL (ref 0.6–1.3)
CREAT UR-MCNC: 69.3 MG/DL
ERYTHROCYTE [DISTWIDTH] IN BLOOD BY AUTOMATED COUNT: 12.6 % (ref 11.6–15.1)
EST. AVERAGE GLUCOSE BLD GHB EST-MCNC: 143 MG/DL
GFR SERPL CREATININE-BSD FRML MDRD: 77 ML/MIN/1.73SQ M
GLUCOSE P FAST SERPL-MCNC: 122 MG/DL (ref 65–99)
HBA1C MFR BLD: 6.6 %
HCT VFR BLD AUTO: 37.2 % (ref 36.5–49.3)
HDLC SERPL-MCNC: 41 MG/DL
HGB BLD-MCNC: 11.7 G/DL (ref 12–17)
LDLC SERPL CALC-MCNC: 59 MG/DL (ref 0–100)
MCH RBC QN AUTO: 30.2 PG (ref 26.8–34.3)
MCHC RBC AUTO-ENTMCNC: 31.5 G/DL (ref 31.4–37.4)
MCV RBC AUTO: 96 FL (ref 82–98)
MICROALBUMIN UR-MCNC: <7 MG/L
MICROALBUMIN/CREAT 24H UR: <10 MG/G CREATININE (ref 0–30)
NONHDLC SERPL-MCNC: 88 MG/DL
PLATELET # BLD AUTO: 338 THOUSANDS/UL (ref 149–390)
PMV BLD AUTO: 9.2 FL (ref 8.9–12.7)
POTASSIUM SERPL-SCNC: 4.8 MMOL/L (ref 3.5–5.3)
PROT SERPL-MCNC: 7 G/DL (ref 6.4–8.4)
RBC # BLD AUTO: 3.87 MILLION/UL (ref 3.88–5.62)
SODIUM SERPL-SCNC: 141 MMOL/L (ref 135–147)
TRIGL SERPL-MCNC: 147 MG/DL
TSH SERPL DL<=0.05 MIU/L-ACNC: 2.1 UIU/ML (ref 0.45–4.5)
WBC # BLD AUTO: 6.54 THOUSAND/UL (ref 4.31–10.16)

## 2024-06-12 PROCEDURE — 82043 UR ALBUMIN QUANTITATIVE: CPT

## 2024-06-12 PROCEDURE — 80053 COMPREHEN METABOLIC PANEL: CPT

## 2024-06-12 PROCEDURE — 83036 HEMOGLOBIN GLYCOSYLATED A1C: CPT

## 2024-06-12 PROCEDURE — 82570 ASSAY OF URINE CREATININE: CPT

## 2024-06-12 PROCEDURE — 85027 COMPLETE CBC AUTOMATED: CPT

## 2024-06-12 PROCEDURE — 80061 LIPID PANEL: CPT

## 2024-06-12 PROCEDURE — 36415 COLL VENOUS BLD VENIPUNCTURE: CPT

## 2024-06-12 PROCEDURE — 84443 ASSAY THYROID STIM HORMONE: CPT

## 2024-06-13 ENCOUNTER — TELEPHONE (OUTPATIENT)
Dept: ADMINISTRATIVE | Facility: OTHER | Age: 74
End: 2024-06-13

## 2024-06-13 NOTE — TELEPHONE ENCOUNTER
06/13/24 10:23 AM    Patient contacted to bring Advance Directive, POLST, or Living Will document to next scheduled pcp visit.[facility] Department spoke with patient/ caregiver.    Thank you.  Lizbeth Zendejas  [facility]

## 2024-06-14 ENCOUNTER — OFFICE VISIT (OUTPATIENT)
Dept: FAMILY MEDICINE CLINIC | Facility: HOSPITAL | Age: 74
End: 2024-06-14
Payer: MEDICARE

## 2024-06-14 VITALS
SYSTOLIC BLOOD PRESSURE: 130 MMHG | HEIGHT: 67 IN | RESPIRATION RATE: 16 BRPM | HEART RATE: 76 BPM | WEIGHT: 189 LBS | BODY MASS INDEX: 29.66 KG/M2 | DIASTOLIC BLOOD PRESSURE: 70 MMHG

## 2024-06-14 DIAGNOSIS — I10 ESSENTIAL HYPERTENSION: ICD-10-CM

## 2024-06-14 DIAGNOSIS — Z00.00 MEDICARE ANNUAL WELLNESS VISIT, SUBSEQUENT: ICD-10-CM

## 2024-06-14 DIAGNOSIS — R73.01 IFG (IMPAIRED FASTING GLUCOSE): Primary | ICD-10-CM

## 2024-06-14 PROCEDURE — G0439 PPPS, SUBSEQ VISIT: HCPCS | Performed by: INTERNAL MEDICINE

## 2024-06-14 PROCEDURE — 99213 OFFICE O/P EST LOW 20 MIN: CPT | Performed by: INTERNAL MEDICINE

## 2024-06-14 NOTE — PATIENT INSTRUCTIONS
Medicare Preventive Visit Patient Instructions  Thank you for completing your Welcome to Medicare Visit or Medicare Annual Wellness Visit today. Your next wellness visit will be due in one year (6/15/2025).  The screening/preventive services that you may require over the next 5-10 years are detailed below. Some tests may not apply to you based off risk factors and/or age. Screening tests ordered at today's visit but not completed yet may show as past due. Also, please note that scanned in results may not display below.  Preventive Screenings:  Service Recommendations Previous Testing/Comments   Colorectal Cancer Screening  Colonoscopy    Fecal Occult Blood Test (FOBT)/Fecal Immunochemical Test (FIT)  Fecal DNA/Cologuard Test  Flexible Sigmoidoscopy Age: 45-75 years old   Colonoscopy: every 10 years (May be performed more frequently if at higher risk)  OR  FOBT/FIT: every 1 year  OR  Cologuard: every 3 years  OR  Sigmoidoscopy: every 5 years  Screening may be recommended earlier than age 45 if at higher risk for colorectal cancer. Also, an individualized decision between you and your healthcare provider will decide whether screening between the ages of 76-85 would be appropriate. Colonoscopy: 09/25/2023  FOBT/FIT: Not on file  Cologuard: Not on file  Sigmoidoscopy: Not on file    Screening Current     Prostate Cancer Screening Individualized decision between patient and health care provider in men between ages of 55-69   Medicare will cover every 12 months beginning on the day after your 50th birthday PSA: 1.44 ng/mL     Screening Not Indicated     Hepatitis C Screening Once for adults born between 1945 and 1965  More frequently in patients at high risk for Hepatitis C Hep C Antibody: 06/25/2019    Screening Current   Diabetes Screening 1-2 times per year if you're at risk for diabetes or have pre-diabetes Fasting glucose: 122 mg/dL (6/12/2024)  A1C: 6.6 % (6/12/2024)  Screening Current   Cholesterol Screening Once  every 5 years if you don't have a lipid disorder. May order more often based on risk factors. Lipid panel: 06/12/2024  Screening Not Indicated  History Lipid Disorder      Other Preventive Screenings Covered by Medicare:  Abdominal Aortic Aneurysm (AAA) Screening: covered once if your at risk. You're considered to be at risk if you have a family history of AAA or a male between the age of 65-75 who smoking at least 100 cigarettes in your lifetime.  Lung Cancer Screening: covers low dose CT scan once per year if you meet all of the following conditions: (1) Age 55-77; (2) No signs or symptoms of lung cancer; (3) Current smoker or have quit smoking within the last 15 years; (4) You have a tobacco smoking history of at least 20 pack years (packs per day x number of years you smoked); (5) You get a written order from a healthcare provider.  Glaucoma Screening: covered annually if you're considered high risk: (1) You have diabetes OR (2) Family history of glaucoma OR (3)  aged 50 and older OR (4)  American aged 65 and older  Osteoporosis Screening: covered every 2 years if you meet one of the following conditions: (1) Have a vertebral abnormality; (2) On glucocorticoid therapy for more than 3 months; (3) Have primary hyperparathyroidism; (4) On osteoporosis medications and need to assess response to drug therapy.  HIV Screening: covered annually if you're between the age of 15-65. Also covered annually if you are younger than 15 and older than 65 with risk factors for HIV infection. For pregnant patients, it is covered up to 3 times per pregnancy.    Immunizations:  Immunization Recommendations   Influenza Vaccine Annual influenza vaccination during flu season is recommended for all persons aged >= 6 months who do not have contraindications   Pneumococcal Vaccine   * Pneumococcal conjugate vaccine = PCV13 (Prevnar 13), PCV15 (Vaxneuvance), PCV20 (Prevnar 20)  * Pneumococcal polysaccharide vaccine  = PPSV23 (Pneumovax) Adults 19-65 yo with certain risk factors or if 65+ yo  If never received any pneumonia vaccine: recommend Prevnar 20 (PCV20)  Give PCV20 if previously received 1 dose of PCV13 or PPSV23   Hepatitis B Vaccine 3 dose series if at intermediate or high risk (ex: diabetes, end stage renal disease, liver disease)   Respiratory syncytial virus (RSV) Vaccine - COVERED BY MEDICARE PART D  * RSVPreF3 (Arexvy) CDC recommends that adults 60 years of age and older may receive a single dose of RSV vaccine using shared clinical decision-making (SCDM)   Tetanus (Td) Vaccine - COST NOT COVERED BY MEDICARE PART B Following completion of primary series, a booster dose should be given every 10 years to maintain immunity against tetanus. Td may also be given as tetanus wound prophylaxis.   Tdap Vaccine - COST NOT COVERED BY MEDICARE PART B Recommended at least once for all adults. For pregnant patients, recommended with each pregnancy.   Shingles Vaccine (Shingrix) - COST NOT COVERED BY MEDICARE PART B  2 shot series recommended in those 19 years and older who have or will have weakened immune systems or those 50 years and older     Health Maintenance Due:      Topic Date Due   • Colorectal Cancer Screening  09/23/2028   • Hepatitis C Screening  Completed     Immunizations Due:      Topic Date Due   • COVID-19 Vaccine (6 - 2023-24 season) 12/05/2023     Advance Directives   What are advance directives?  Advance directives are legal documents that state your wishes and plans for medical care. These plans are made ahead of time in case you lose your ability to make decisions for yourself. Advance directives can apply to any medical decision, such as the treatments you want, and if you want to donate organs.   What are the types of advance directives?  There are many types of advance directives, and each state has rules about how to use them. You may choose a combination of any of the following:  Living will:  This is  a written record of the treatment you want. You can also choose which treatments you do not want, which to limit, and which to stop at a certain time. This includes surgery, medicine, IV fluid, and tube feedings.   Durable power of  for healthcare (DPAHC):  This is a written record that states who you want to make healthcare choices for you when you are unable to make them for yourself. This person, called a proxy, is usually a family member or a friend. You may choose more than 1 proxy.  Do not resuscitate (DNR) order:  A DNR order is used in case your heart stops beating or you stop breathing. It is a request not to have certain forms of treatment, such as CPR. A DNR order may be included in other types of advance directives.  Medical directive:  This covers the care that you want if you are in a coma, near death, or unable to make decisions for yourself. You can list the treatments you want for each condition. Treatment may include pain medicine, surgery, blood transfusions, dialysis, IV or tube feedings, and a ventilator (breathing machine).  Values history:  This document has questions about your views, beliefs, and how you feel and think about life. This information can help others choose the care that you would choose.  Why are advance directives important?  An advance directive helps you control your care. Although spoken wishes may be used, it is better to have your wishes written down. Spoken wishes can be misunderstood, or not followed. Treatments may be given even if you do not want them. An advance directive may make it easier for your family to make difficult choices about your care.   Weight Management   Why it is important to manage your weight:  Being overweight increases your risk of health conditions such as heart disease, high blood pressure, type 2 diabetes, and certain types of cancer. It can also increase your risk for osteoarthritis, sleep apnea, and other respiratory problems. Aim  for a slow, steady weight loss. Even a small amount of weight loss can lower your risk of health problems.  How to lose weight safely:  A safe and healthy way to lose weight is to eat fewer calories and get regular exercise. You can lose up about 1 pound a week by decreasing the number of calories you eat by 500 calories each day.   Healthy meal plan for weight management:  A healthy meal plan includes a variety of foods, contains fewer calories, and helps you stay healthy. A healthy meal plan includes the following:  Eat whole-grain foods more often.  A healthy meal plan should contain fiber. Fiber is the part of grains, fruits, and vegetables that is not broken down by your body. Whole-grain foods are healthy and provide extra fiber in your diet. Some examples of whole-grain foods are whole-wheat breads and pastas, oatmeal, brown rice, and bulgur.  Eat a variety of vegetables every day.  Include dark, leafy greens such as spinach, kale, ilene greens, and mustard greens. Eat yellow and orange vegetables such as carrots, sweet potatoes, and winter squash.   Eat a variety of fruits every day.  Choose fresh or canned fruit (canned in its own juice or light syrup) instead of juice. Fruit juice has very little or no fiber.  Eat low-fat dairy foods.  Drink fat-free (skim) milk or 1% milk. Eat fat-free yogurt and low-fat cottage cheese. Try low-fat cheeses such as mozzarella and other reduced-fat cheeses.  Choose meat and other protein foods that are low in fat.  Choose beans or other legumes such as split peas or lentils. Choose fish, skinless poultry (chicken or turkey), or lean cuts of red meat (beef or pork). Before you cook meat or poultry, cut off any visible fat.   Use less fat and oil.  Try baking foods instead of frying them. Add less fat, such as margarine, sour cream, regular salad dressing and mayonnaise to foods. Eat fewer high-fat foods. Some examples of high-fat foods include french fries, doughnuts, ice  "cream, and cakes.  Eat fewer sweets.  Limit foods and drinks that are high in sugar. This includes candy, cookies, regular soda, and sweetened drinks.  Exercise:  Exercise at least 30 minutes per day on most days of the week. Some examples of exercise include walking, biking, dancing, and swimming. You can also fit in more physical activity by taking the stairs instead of the elevator or parking farther away from stores. Ask your healthcare provider about the best exercise plan for you.   Alcohol Use and Your Health    Drinking too much can harm your health.  Excessive alcohol use leads to about 88,000 death in the [place] each year, and shortens the life of those who diet by almost 30 years.  Further, excessive drinking cost the economy $249 billion in 2010.  Most excessive drinkers are not alcohol dependent.    Excessive alcohol use has immediate effects that increase the risk of many harmful health conditions.  These are most often the result of binge drinking.  Over time, excessive alcohol use can lead to the development of chronic diseases and other series health problems.    What is considered a \"drink\"?        Excessive alcohol use includes:  Binge Drinking: For women, 4 or more drinks consumed on one occasion. For men, 5 or more drinks consumed on one occasion.  Heavy Drinking: For women, 8 or more drinks per week. For men, 15 or more drinks per week  Any alcohol used by pregnant women  Any alcohol used by those under the age of 21 years    If you choose to drink, do so in moderation:  Do not drink at all if you are under the age of 21, or if you are or may be pregnant, or have health problems that could be made worse by drinking.  For women, up to 1 drink per day  For men, up to 2 drinks a day    No one should begin drinking or drink more frequently based on potential health benefits    Short-Term Health Risks:  Injuries: motor vehicle crashes, falls, drownings, burns  Violence: homicide, suicide, " sexual assault, intimate partner violence  Alcohol poisoning  Reproductive health: risky sexual behaviors, unintended prengnacy, sexually transmitted diseases, miscarriage, stillbirth, fetal alcohol syndrome    Long-Term Health Risks:  Chronic diseases: high blood pressure, heart disease, stroke, liver disease, digestive problems  Cancers: breast, mouth and throat, liver, colon  Learning and memory problems: dementia, poor school performance  Mental health: depression, anxiety, insomnia  Social problems: lost productivity, family problems, unemployment  Alcohol dependence    For support and more information:  Substance Abuse and Mental Health Services Administration  [address]  [place] [address]  Web Address: http://www.[facility].gov    Alcoholics Anonymous        Web Address: http://www.aa.org    https://www.cdc.gov/alcohol/fact-sheets/alcohol-use.htm     © Copyright [employer] 2018 Information is for End User's use only and may not be sold, redistributed or otherwise used for commercial purposes. All illustrations and images included in CareNotes® are the copyrighted property of [employer] or [employer]    Medicare Preventive Visit Patient Instructions  Thank you for completing your Welcome to Medicare Visit or Medicare Annual Wellness Visit today. Your next wellness visit will be due in one year (6/15/2025).  The screening/preventive services that you may require over the next 5-10 years are detailed below. Some tests may not apply to you based off risk factors and/or age. Screening tests ordered at today's visit but not completed yet may show as past due. Also, please note that scanned in results may not display below.  Preventive Screenings:  Service Recommendations Previous Testing/Comments   Colorectal Cancer Screening  Colonoscopy    Fecal Occult Blood Test (FOBT)/Fecal Immunochemical Test (FIT)  Fecal DNA/Cologuard Test  Flexible Sigmoidoscopy Age: 45-75 years old   Colonoscopy: every  10 years (May be performed more frequently if at higher risk)  OR  FOBT/FIT: every 1 year  OR  Cologuard: every 3 years  OR  Sigmoidoscopy: every 5 years  Screening may be recommended earlier than age 45 if at higher risk for colorectal cancer. Also, an individualized decision between you and your healthcare provider will decide whether screening between the ages of 76-85 would be appropriate. Colonoscopy: 09/25/2023  FOBT/FIT: Not on file  Cologuard: Not on file  Sigmoidoscopy: Not on file    Screening Current     Prostate Cancer Screening Individualized decision between patient and health care provider in men between ages of 55-69   Medicare will cover every 12 months beginning on the day after your 50th birthday PSA: 1.44 ng/mL     Screening Not Indicated     Hepatitis C Screening Once for adults born between 1945 and 1965  More frequently in patients at high risk for Hepatitis C Hep C Antibody: 06/25/2019    Screening Current   Diabetes Screening 1-2 times per year if you're at risk for diabetes or have pre-diabetes Fasting glucose: 122 mg/dL (6/12/2024)  A1C: 6.6 % (6/12/2024)  Screening Current   Cholesterol Screening Once every 5 years if you don't have a lipid disorder. May order more often based on risk factors. Lipid panel: 06/12/2024  Screening Not Indicated  History Lipid Disorder      Other Preventive Screenings Covered by Medicare:  Abdominal Aortic Aneurysm (AAA) Screening: covered once if your at risk. You're considered to be at risk if you have a family history of AAA or a male between the age of 65-75 who smoking at least 100 cigarettes in your lifetime.  Lung Cancer Screening: covers low dose CT scan once per year if you meet all of the following conditions: (1) Age 55-77; (2) No signs or symptoms of lung cancer; (3) Current smoker or have quit smoking within the last 15 years; (4) You have a tobacco smoking history of at least 20 pack years (packs per day x number of years you smoked); (5) You  get a written order from a healthcare provider.  Glaucoma Screening: covered annually if you're considered high risk: (1) You have diabetes OR (2) Family history of glaucoma OR (3)  aged 50 and older OR (4)  American aged 65 and older  Osteoporosis Screening: covered every 2 years if you meet one of the following conditions: (1) Have a vertebral abnormality; (2) On glucocorticoid therapy for more than 3 months; (3) Have primary hyperparathyroidism; (4) On osteoporosis medications and need to assess response to drug therapy.  HIV Screening: covered annually if you're between the age of 15-65. Also covered annually if you are younger than 15 and older than 65 with risk factors for HIV infection. For pregnant patients, it is covered up to 3 times per pregnancy.    Immunizations:  Immunization Recommendations   Influenza Vaccine Annual influenza vaccination during flu season is recommended for all persons aged >= 6 months who do not have contraindications   Pneumococcal Vaccine   * Pneumococcal conjugate vaccine = PCV13 (Prevnar 13), PCV15 (Vaxneuvance), PCV20 (Prevnar 20)  * Pneumococcal polysaccharide vaccine = PPSV23 (Pneumovax) Adults 19-65 yo with certain risk factors or if 65+ yo  If never received any pneumonia vaccine: recommend Prevnar 20 (PCV20)  Give PCV20 if previously received 1 dose of PCV13 or PPSV23   Hepatitis B Vaccine 3 dose series if at intermediate or high risk (ex: diabetes, end stage renal disease, liver disease)   Respiratory syncytial virus (RSV) Vaccine - COVERED BY MEDICARE PART D  * RSVPreF3 (Arexvy) CDC recommends that adults 60 years of age and older may receive a single dose of RSV vaccine using shared clinical decision-making (SCDM)   Tetanus (Td) Vaccine - COST NOT COVERED BY MEDICARE PART B Following completion of primary series, a booster dose should be given every 10 years to maintain immunity against tetanus. Td may also be given as tetanus wound prophylaxis.    Tdap Vaccine - COST NOT COVERED BY MEDICARE PART B Recommended at least once for all adults. For pregnant patients, recommended with each pregnancy.   Shingles Vaccine (Shingrix) - COST NOT COVERED BY MEDICARE PART B  2 shot series recommended in those 19 years and older who have or will have weakened immune systems or those 50 years and older     Health Maintenance Due:      Topic Date Due   • Colorectal Cancer Screening  09/23/2028   • Hepatitis C Screening  Completed     Immunizations Due:      Topic Date Due   • COVID-19 Vaccine (6 - 2023-24 season) 12/05/2023     Advance Directives   What are advance directives?  Advance directives are legal documents that state your wishes and plans for medical care. These plans are made ahead of time in case you lose your ability to make decisions for yourself. Advance directives can apply to any medical decision, such as the treatments you want, and if you want to donate organs.   What are the types of advance directives?  There are many types of advance directives, and each state has rules about how to use them. You may choose a combination of any of the following:  Living will:  This is a written record of the treatment you want. You can also choose which treatments you do not want, which to limit, and which to stop at a certain time. This includes surgery, medicine, IV fluid, and tube feedings.   Durable power of  for healthcare (DPAHC):  This is a written record that states who you want to make healthcare choices for you when you are unable to make them for yourself. This person, called a proxy, is usually a family member or a friend. You may choose more than 1 proxy.  Do not resuscitate (DNR) order:  A DNR order is used in case your heart stops beating or you stop breathing. It is a request not to have certain forms of treatment, such as CPR. A DNR order may be included in other types of advance directives.  Medical directive:  This covers the care that you  want if you are in a coma, near death, or unable to make decisions for yourself. You can list the treatments you want for each condition. Treatment may include pain medicine, surgery, blood transfusions, dialysis, IV or tube feedings, and a ventilator (breathing machine).  Values history:  This document has questions about your views, beliefs, and how you feel and think about life. This information can help others choose the care that you would choose.  Why are advance directives important?  An advance directive helps you control your care. Although spoken wishes may be used, it is better to have your wishes written down. Spoken wishes can be misunderstood, or not followed. Treatments may be given even if you do not want them. An advance directive may make it easier for your family to make difficult choices about your care.   Weight Management   Why it is important to manage your weight:  Being overweight increases your risk of health conditions such as heart disease, high blood pressure, type 2 diabetes, and certain types of cancer. It can also increase your risk for osteoarthritis, sleep apnea, and other respiratory problems. Aim for a slow, steady weight loss. Even a small amount of weight loss can lower your risk of health problems.  How to lose weight safely:  A safe and healthy way to lose weight is to eat fewer calories and get regular exercise. You can lose up about 1 pound a week by decreasing the number of calories you eat by 500 calories each day.   Healthy meal plan for weight management:  A healthy meal plan includes a variety of foods, contains fewer calories, and helps you stay healthy. A healthy meal plan includes the following:  Eat whole-grain foods more often.  A healthy meal plan should contain fiber. Fiber is the part of grains, fruits, and vegetables that is not broken down by your body. Whole-grain foods are healthy and provide extra fiber in your diet. Some examples of whole-grain foods are  whole-wheat breads and pastas, oatmeal, brown rice, and bulgur.  Eat a variety of vegetables every day.  Include dark, leafy greens such as spinach, kale, ilene greens, and mustard greens. Eat yellow and orange vegetables such as carrots, sweet potatoes, and winter squash.   Eat a variety of fruits every day.  Choose fresh or canned fruit (canned in its own juice or light syrup) instead of juice. Fruit juice has very little or no fiber.  Eat low-fat dairy foods.  Drink fat-free (skim) milk or 1% milk. Eat fat-free yogurt and low-fat cottage cheese. Try low-fat cheeses such as mozzarella and other reduced-fat cheeses.  Choose meat and other protein foods that are low in fat.  Choose beans or other legumes such as split peas or lentils. Choose fish, skinless poultry (chicken or turkey), or lean cuts of red meat (beef or pork). Before you cook meat or poultry, cut off any visible fat.   Use less fat and oil.  Try baking foods instead of frying them. Add less fat, such as margarine, sour cream, regular salad dressing and mayonnaise to foods. Eat fewer high-fat foods. Some examples of high-fat foods include french fries, doughnuts, ice cream, and cakes.  Eat fewer sweets.  Limit foods and drinks that are high in sugar. This includes candy, cookies, regular soda, and sweetened drinks.  Exercise:  Exercise at least 30 minutes per day on most days of the week. Some examples of exercise include walking, biking, dancing, and swimming. You can also fit in more physical activity by taking the stairs instead of the elevator or parking farther away from stores. Ask your healthcare provider about the best exercise plan for you.   Alcohol Use and Your Health    Drinking too much can harm your health.  Excessive alcohol use leads to about 88,000 death in the [place] each year, and shortens the life of those who diet by almost 30 years.  Further, excessive drinking cost the economy $249 billion in 2010.  Most excessive  "drinkers are not alcohol dependent.    Excessive alcohol use has immediate effects that increase the risk of many harmful health conditions.  These are most often the result of binge drinking.  Over time, excessive alcohol use can lead to the development of chronic diseases and other series health problems.    What is considered a \"drink\"?        Excessive alcohol use includes:  Binge Drinking: For women, 4 or more drinks consumed on one occasion. For men, 5 or more drinks consumed on one occasion.  Heavy Drinking: For women, 8 or more drinks per week. For men, 15 or more drinks per week  Any alcohol used by pregnant women  Any alcohol used by those under the age of 21 years    If you choose to drink, do so in moderation:  Do not drink at all if you are under the age of 21, or if you are or may be pregnant, or have health problems that could be made worse by drinking.  For women, up to 1 drink per day  For men, up to 2 drinks a day    No one should begin drinking or drink more frequently based on potential health benefits    Short-Term Health Risks:  Injuries: motor vehicle crashes, falls, drownings, burns  Violence: homicide, suicide, sexual assault, intimate partner violence  Alcohol poisoning  Reproductive health: risky sexual behaviors, unintended prengnacy, sexually transmitted diseases, miscarriage, stillbirth, fetal alcohol syndrome    Long-Term Health Risks:  Chronic diseases: high blood pressure, heart disease, stroke, liver disease, digestive problems  Cancers: breast, mouth and throat, liver, colon  Learning and memory problems: dementia, poor school performance  Mental health: depression, anxiety, insomnia  Social problems: lost productivity, family problems, unemployment  Alcohol dependence    For support and more information:  Substance Abuse and Mental Health Services Administration  [address]  [place] [address]  Web Address: http://www.samhsa.gov    Alcoholics Anonymous        Web Address: " http://www.aa.org    https://www.cdc.gov/alcohol/fact-sheets/alcohol-use.htm     © Copyright [employer] 2018 Information is for End User's use only and may not be sold, redistributed or otherwise used for commercial purposes. All illustrations and images included in CareNotes® are the copyrighted property of [employer].D.A[employer]M., Inc. or [employer]

## 2024-06-14 NOTE — PROGRESS NOTES
Ambulatory Visit  Name: Jose Garcia      : 1950      MRN: [id number]  Encounter Provider: Michel Jarrell MD  Encounter Date: 2024   Encounter department: [facility] [address]    Assessment & Plan   1. IFG (impaired fasting glucose)  Assessment & Plan:  Patient was taking care of ailing family member and was not able to exercise.  He could not eat healthy either.  He received steroids for gout in the right thumb.  Blood sugars increased and A1c is 6.6.    Patient denies blurry vision, polydipsia, polyuria, numbness or tingling of the feet.    He will watch his carb intake and will increase physical activity.  I will recheck A1c in 6 months.    Monofilament testing of the feet was normal today  Orders:  -     Hemoglobin A1C; Future; Expected date: 2024  -     Comprehensive metabolic panel; Future; Expected date: 2024  -     Albumin / creatinine urine ratio; Future; Expected date: 2024  2. Medicare annual wellness visit, subsequent  3. Essential hypertension  Assessment & Plan:  Patient has hypertension.  Blood pressure is controlled.  Electrolytes were normal this month on  and renal function was stable.  Continue losartan 100 mg daily    Recheck electrolytes in 6 months  Orders:  -     TSH, 3rd generation with Free T4 reflex; Future; Expected date: 2024      Depression Screening and Follow-up Plan: Patient was screened for depression during today's encounter. They screened negative with a PHQ-2 score of 0.      Preventive health issues were discussed with patient, and age appropriate screening tests were ordered as noted in patient's After Visit Summary. Personalized health advice and appropriate referrals for health education or preventive services given if needed, as noted in patient's After Visit Summary.    History of Present Illness     HPI   Patient Care Team:  Michel Jarrell MD as PCP - General (Internal Medicine)    Review of Systems    Constitutional:  Negative for fever.   HENT:  Negative for hearing loss.    Eyes:  Negative for visual disturbance.   Respiratory:  Negative for cough and shortness of breath.    Cardiovascular:  Negative for chest pain and palpitations.   Gastrointestinal:  Negative for abdominal pain, blood in stool, constipation and diarrhea.   Endocrine: Negative for polydipsia and polyphagia.   Genitourinary:  Negative for difficulty urinating and hematuria.   Musculoskeletal:         Right thumb pain is better   Skin:  Negative for rash.   Neurological:  Negative for headaches.   Psychiatric/Behavioral:  Negative for dysphoric mood.    All other systems reviewed and are negative.    Medical History Reviewed by provider this encounter:  Tobacco  Allergies  Meds  Problems  Med Hx  Surg Hx  Fam Hx       Annual Wellness Visit Questionnaire   Jose is here for his Subsequent Wellness visit.     Health Risk Assessment:   Patient rates overall health as very good. Patient feels that their physical health rating is same. Patient is very satisfied with their life. Eyesight was rated as same. Hearing was rated as same. Patient feels that their emotional and mental health rating is same. Patients states they are never, rarely angry. Patient states they are sometimes unusually tired/fatigued. Pain experienced in the last 7 days has been a lot. Patient's pain rating has been 6/10. Patient states that he has experienced no weight loss or gain in last 6 months.     Depression Screening:   PHQ-2 Score: 0      Fall Risk Screening:   In the past year, patient has experienced: no history of falling in past year      Home Safety:  Patient does not have trouble with stairs inside or outside of their home. Patient has working smoke alarms and has working carbon monoxide detector. Home safety hazards include: none.     Nutrition:   Current diet is Regular.     Medications:   Patient is not currently taking any over-the-counter  supplements. Patient is able to manage medications.     Activities of Daily Living (ADLs)/Instrumental Activities of Daily Living (IADLs):   Walk and transfer into and out of bed and chair?: Yes  Dress and groom yourself?: Yes    Bathe or shower yourself?: Yes    Feed yourself? Yes  Do your laundry/housekeeping?: Yes  Manage your money, pay your bills and track your expenses?: Yes  Make your own meals?: Yes    Do your own shopping?: Yes    Previous Hospitalizations:   Any hospitalizations or ED visits within the last 12 months?: No      Advance Care Planning:   Living will: Yes    Durable POA for healthcare: Yes    Advanced directive: Yes    Advanced directive counseling given: Yes      PREVENTIVE SCREENINGS      Cardiovascular Screening:    General: Screening Not Indicated, History Lipid Disorder, Risks and Benefits Discussed and Screening Current      Diabetes Screening:     General: Screening Current and Risks and Benefits Discussed      Colorectal Cancer Screening:     General: Screening Current and Risks and Benefits Discussed      Prostate Cancer Screening:    General: Screening Not Indicated and Risks and Benefits Discussed      Abdominal Aortic Aneurysm (AAA) Screening:    Risk factors include: tobacco use        Lung Cancer Screening:     General: Screening Not Indicated      Hepatitis C Screening:    General: Screening Current    Screening, Brief Intervention, and Referral to Treatment (SBIRT)    Screening  Typical number of drinks in a day: 1  Typical number of drinks in a week: 6  Interpretation: Low risk drinking behavior.    AUDIT-C Screenin) How often did you have a drink containing alcohol in the past year? 4 or more times a week  2) How many drinks did you have on a typical day when you were drinking in the past year? 1 to 2  3) How often did you have 6 or more drinks on one occasion in the past year? never    AUDIT-C Score: 4  Interpretation: Score 4-12 (male): POSITIVE screen for alcohol  misuse    AUDIT Screenin) How often during the last year have you found that you were not able to stop drinking once you had started? 0 - never  5) How often during the last year have you failed to do what was normally expected from you because of drinking? 0 - never  6) How often during the last year have you needed a first drink in the morning to get yourself going after a heavy drinking session? 0 - never  7) How often during the last year have you had a feeling of guilt or remorse after drinking? 0 - never  8) How often during the last year have you been unable to remember what happened the night before because you had been drinking? 0 - never  9) Have you or someone else been injured as a result of your drinking? 0 - no  10) Has a relative or friend or a doctor or another health worker been concerned about your drinking or suggested you cut down? 0 - no    AUDIT Score: 4  Interpretation: Low risk alcohol consumption    Single Item Drug Screening:  How often have you used an illegal drug (including marijuana) or a prescription medication for non-medical reasons in the past year? never    Single Item Drug Screen Score: 0  Interpretation: Negative screen for possible drug use disorder    Brief Intervention  Alcohol & drug use screenings were reviewed. No concerns regarding substance use disorder identified.     Other Counseling Topics:   Regular weightbearing exercise.     Social Determinants of Health     Financial Resource Strain: Low Risk  (2023)    Overall Financial Resource Strain (CARDI)    • Difficulty of Paying Living Expenses: Not hard at all   Food Insecurity: No Food Insecurity (2024)    Hunger Vital Sign    • Worried About Running Out of Food in the Last Year: Never true    • Ran Out of Food in the Last Year: Never true   Transportation Needs: No Transportation Needs (2024)    PRAPARE - Transportation    • Lack of Transportation (Medical): No    • Lack of Transportation  "(Non-Medical): No   Housing Stability: Low Risk  (6/12/2024)    Housing Stability Vital Sign    • Unable to Pay for Housing in the Last Year: No    • Number of Times Moved in the Last Year: 0    • Homeless in the Last Year: No   Utilities: Not At Risk (6/12/2024)    [facility] Utilities    • Threatened with loss of utilities: No     No results found.    Objective     /70 (BP Location: Left arm, Patient Position: Sitting)   Pulse 76   Resp 16   Ht 5' 7\" (1.702 m)   Wt 85.7 kg (189 lb)   BMI 29.60 kg/m²     Physical Exam  Constitutional:       General: He is not in acute distress.     Appearance: He is well-developed. He is not toxic-appearing.   HENT:      Head: Normocephalic.   Eyes:      Conjunctiva/sclera: Conjunctivae normal.   Cardiovascular:      Rate and Rhythm: Normal rate and regular rhythm.      Pulses: no weak pulses.           Dorsalis pedis pulses are 2+ on the right side and 2+ on the left side.        Posterior tibial pulses are 2+ on the right side and 2+ on the left side.      Heart sounds: No murmur heard.  Pulmonary:      Effort: No respiratory distress.      Breath sounds: No wheezing or rales.   Abdominal:      General: Bowel sounds are normal.      Palpations: Abdomen is soft.      Tenderness: There is no abdominal tenderness.   Musculoskeletal:         General: No tenderness.      Cervical back: Neck supple.   Feet:      Right foot:      Skin integrity: No ulcer.      Left foot:      Skin integrity: No ulcer.   Skin:     General: Skin is warm and dry.   Neurological:      Mental Status: He is alert and oriented to person, place, and time.      Cranial Nerves: No cranial nerve deficit.      Motor: No weakness.   Psychiatric:         Mood and Affect: Mood normal.     Diabetic Foot Exam    Patient's shoes and socks removed.    Right Foot/Ankle   Right Foot Inspection  Skin Exam: No ulcer.     Toe Exam: Erythema    Sensory   Monofilament testing: intact    Vascular  The right DP pulse is 2+. " The right PT pulse is 2+.     Left Foot/Ankle  Left Foot Inspection  Skin Exam: No ulcer.     Toe Exam: No erythema.     Sensory   Monofilament testing: intact    Vascular  The left DP pulse is 2+. The left PT pulse is 2+.     Assign Risk Category  No deformity present  No loss of protective sensation  No weak pulses  Risk: 0    Administrative Statements

## 2024-06-14 NOTE — ASSESSMENT & PLAN NOTE
Patient has hypertension.  Blood pressure is controlled.  Electrolytes were normal this month on June 12 and renal function was stable.  Continue losartan 100 mg daily    Recheck electrolytes in 6 months

## 2024-06-14 NOTE — ASSESSMENT & PLAN NOTE
Patient was taking care of ailing family member and was not able to exercise.  He could not eat healthy either.  He received steroids for gout in the right thumb.  Blood sugars increased and A1c is 6.6.    Patient denies blurry vision, polydipsia, polyuria, numbness or tingling of the feet.    He will watch his carb intake and will increase physical activity.  I will recheck A1c in 6 months.    Monofilament testing of the feet was normal today

## 2024-06-18 DIAGNOSIS — M18.11 PRIMARY OSTEOARTHRITIS OF FIRST CARPOMETACARPAL JOINT OF RIGHT HAND: ICD-10-CM

## 2024-06-18 DIAGNOSIS — M19.131 SLAC (SCAPHOLUNATE ADVANCED COLLAPSE) OF WRIST, RIGHT: Primary | ICD-10-CM

## 2024-06-24 ENCOUNTER — EVALUATION (OUTPATIENT)
Dept: OCCUPATIONAL THERAPY | Facility: CLINIC | Age: 74
End: 2024-06-24
Payer: MEDICARE

## 2024-06-24 DIAGNOSIS — M18.11 PRIMARY OSTEOARTHRITIS OF FIRST CARPOMETACARPAL JOINT OF RIGHT HAND: ICD-10-CM

## 2024-06-24 DIAGNOSIS — M19.131 SLAC (SCAPHOLUNATE ADVANCED COLLAPSE) OF WRIST, RIGHT: ICD-10-CM

## 2024-06-24 PROCEDURE — 97165 OT EVAL LOW COMPLEX 30 MIN: CPT | Performed by: OCCUPATIONAL THERAPIST

## 2024-06-24 PROCEDURE — 97760 ORTHOTIC MGMT&TRAING 1ST ENC: CPT | Performed by: OCCUPATIONAL THERAPIST

## 2024-06-24 NOTE — PROGRESS NOTES
OT Evaluation     Today's date: 2024  Patient name: Jose Garcia  : 1950  MRN: [id number]  Referring provider: Jerzy Klein MD  Dx:   Encounter Diagnosis     ICD-10-CM    1. Slac (scapholunate advanced collapse) of wrist, right  M19.131 Ambulatory Referral to PT/OT Hand Therapy      2. Primary osteoarthritis of first carpometacarpal joint of right hand  M18.11 Ambulatory Referral to PT/OT Hand Therapy                     Assessment  Impairments: abnormal or restricted ROM, lacks appropriate home exercise program and pain with function  Functional limitations: Pt. limited with all of his ADLS with his R dominant hand due to pain and stiffness.  Symptom irritability: high    Assessment details: Pt. Presents today for evaluation of the R wrist due to SLAC wrist and CMC OA.  Pt has advanced degerative joint changes with pain.  Pt. Has a loss of his ROM and significant muscle atrophy of the FA.  He has weak R  and pinch strength.  Pt. Is impaired with his R dominant hand use.  Pt. Would benefit from custom wrist brace for pain management and joint protection.  Recommend F/U with MD.  Pt. Will benefit from skilled hand therapy to reduce pain and improve his ROM for function.  Understanding of Dx/Px/POC: poor     Prognosis: poor    Goals  STG( 4 visits)  1 Compliant with HEP/splint  2. Reduce pain to less than 8/10 for function  LTG( 8 visits or discharge)  1. Full composite fist for function.  2.  Increase R wrist AROM by 5-10 degrees for ADLS.  3. Reduce pain to less than 5/10 for function.      Plan  Patient would benefit from: skilled occupational therapy  Planned modality interventions: cryotherapy and thermotherapy: hydrocollator packs    Planned therapy interventions: IASTM, joint mobilization, manual therapy, home exercise program, graded exercise, therapeutic exercise, therapeutic activities, fine motor coordination training and orthotic fitting/training    Frequency: 2x week  Duration in  weeks: 6  Plan of Care beginning date: 2024  Plan of Care expiration date: 2024  Treatment plan discussed with: patient        Subjective Evaluation    History of Present Illness  Mechanism of injury: Pt. Presents today for evaluation of the R wrist due to CMC OA and SLAC of the wrist.  Pt. S/p CSI with little relief.  Quality of life: good    Patient Goals  Patient goals for therapy: decreased pain and increased motion    Pain  Current pain rating: 3  At worst pain ratin  Quality: discomfort, sharp, throbbing and dull ache  Relieving factors: ice  Aggravating factors: nothing    Social Support  Lives with: spouse    Employment status: not working  Hand dominance: right      Diagnostic Tests  X-ray: abnormal  Treatments  Current treatment: occupational therapy        Objective     Tenderness     Right Wrist/Hand   Tenderness in the distal radioulnar joint.     Active Range of Motion     Right Wrist   Wrist flexion: 15 degrees with pain  Wrist extension: 25 degrees with pain  Radial deviation: 0 degrees with pain  Ulnar deviation: 10 degrees with pain    Right Thumb   Flexion     MP: 60    DIP: 60  Opposition: Opposes to ring finger with effort.    Additional Active Range of Motion Details  50% composite fist  Able to touch DPC with passive flexion.    Strength/Myotome Testing     Left Wrist/Hand      (2nd hand position)     Trial 1: 35    Right Wrist/Hand   Wrist extension: 3-  Wrist flexion: 3-  Radial deviation: 3-  Ulnar deviation: 3-     (2nd hand position)     Trial 1: 0             Precautions: SLAC wrist, Severe CMC OA, GOUT      Manuals              IE 30'            Graston R             Instinsic stretches                          Neuro Re-Ed                          HEP- Wrist AROM, splint reviewed                         Custom wrist  Ortho fit                                                   Ther Ex             Wrist A/AAROM             Foam roll stretch flex/ext                                                                                            Ther Activity             opposition                          Gait Training                                       Modalities              R 8m

## 2024-06-27 DIAGNOSIS — M15.9 PRIMARY OSTEOARTHRITIS INVOLVING MULTIPLE JOINTS: ICD-10-CM

## 2024-06-27 DIAGNOSIS — M18.11 PRIMARY OSTEOARTHRITIS OF FIRST CARPOMETACARPAL JOINT OF RIGHT HAND: Primary | ICD-10-CM

## 2024-06-28 ENCOUNTER — OFFICE VISIT (OUTPATIENT)
Dept: OCCUPATIONAL THERAPY | Facility: CLINIC | Age: 74
End: 2024-06-28
Payer: MEDICARE

## 2024-06-28 DIAGNOSIS — M19.131 SLAC (SCAPHOLUNATE ADVANCED COLLAPSE) OF WRIST, RIGHT: Primary | ICD-10-CM

## 2024-06-28 DIAGNOSIS — M18.11 PRIMARY OSTEOARTHRITIS OF FIRST CARPOMETACARPAL JOINT OF RIGHT HAND: ICD-10-CM

## 2024-06-28 PROCEDURE — 97140 MANUAL THERAPY 1/> REGIONS: CPT | Performed by: OCCUPATIONAL THERAPIST

## 2024-06-28 PROCEDURE — 97110 THERAPEUTIC EXERCISES: CPT | Performed by: OCCUPATIONAL THERAPIST

## 2024-06-28 NOTE — PROGRESS NOTES
Daily Note     Today's date: 2024  Patient name: Jose Garcia  : 1950  MRN: [id number]  Referring provider: Jerzy Klein MD  Dx:   Encounter Diagnosis     ICD-10-CM    1. Slac (scapholunate advanced collapse) of wrist, right  M19.131       2. Primary osteoarthritis of first carpometacarpal joint of right hand  M18.11                      Subjective: The splint has been really helping.      Objective: See treatment diary below      Assessment: Tolerated treatment well. Patient  has had pain relief since receiving a custom wrist splint for activities.   Pt. Has made improvements with his finger mobility with his wrist support.      Plan: Continue per plan of care.      Precautions: SLAC wrist, Severe CMC OA, GOUT      Manuals             IE 30'            Graston R  4m           Instinsic stretches  4m                        Neuro Re-Ed                          HEP- Wrist AROM, splint reviewed                         Custom wrist  Ortho fit                                                   Ther Ex             Wrist A/AAROM  2m           Foam roll stretch flex/ext             Wrist ext  #1 3x10           P/S therabar  Y 3x10           Light gripping  GPW 2x30                                                  Ther Activity             opposition                          Gait Training                                       Modalities             MH R 8m 8m           CP post  5m

## 2024-07-01 ENCOUNTER — OFFICE VISIT (OUTPATIENT)
Dept: OCCUPATIONAL THERAPY | Facility: CLINIC | Age: 74
End: 2024-07-01
Payer: MEDICARE

## 2024-07-01 DIAGNOSIS — M19.131 SLAC (SCAPHOLUNATE ADVANCED COLLAPSE) OF WRIST, RIGHT: Primary | ICD-10-CM

## 2024-07-01 DIAGNOSIS — M18.11 PRIMARY OSTEOARTHRITIS OF FIRST CARPOMETACARPAL JOINT OF RIGHT HAND: ICD-10-CM

## 2024-07-01 PROCEDURE — 97110 THERAPEUTIC EXERCISES: CPT | Performed by: OCCUPATIONAL THERAPIST

## 2024-07-01 PROCEDURE — 97140 MANUAL THERAPY 1/> REGIONS: CPT | Performed by: OCCUPATIONAL THERAPIST

## 2024-07-01 NOTE — PROGRESS NOTES
Daily Note     Today's date: 2024  Patient name: Jose Garcia  : 1950  MRN: [id number]  Referring provider: Jerzy Klein MD  Dx:   Encounter Diagnosis     ICD-10-CM    1. Slac (scapholunate advanced collapse) of wrist, right  M19.131       2. Primary osteoarthritis of first carpometacarpal joint of right hand  M18.11                      Subjective: My wrist is less painful.      Objective: See treatment diary below      Assessment: Tolerated treatment well. Patient  has had pain relief since receiving a custom wrist splint for activities.   Pt. Has made improvements with his finger mobility with his wrist support.   Awaiting F/U with hand MD next week.        Plan: Continue per plan of care.      Precautions: SLAC wrist, Severe CMC OA, GOUT      Manuals            IE 30'            Graston R  4m 4m          Instinsic stretches  4m 4m                       Neuro Re-Ed                          HEP- Wrist AROM, splint reviewed                         Custom wrist  Ortho fit                                                   Ther Ex             Wrist A/AAROM  2m 2m          Foam roll stretch flex/ext             Wrist ext  #1 3x10 #1 3x10          P/S therabar  Y 3x10 Y 3x10          Light gripping  GPW 2x30 GPW 2x30                                                 Ther Activity             opposition                          Gait Training                                       Modalities             MH R 8m 8m 8m          CP post  5m 5m

## 2024-07-03 ENCOUNTER — OFFICE VISIT (OUTPATIENT)
Dept: OCCUPATIONAL THERAPY | Facility: CLINIC | Age: 74
End: 2024-07-03
Payer: MEDICARE

## 2024-07-03 DIAGNOSIS — M19.131 SLAC (SCAPHOLUNATE ADVANCED COLLAPSE) OF WRIST, RIGHT: Primary | ICD-10-CM

## 2024-07-03 PROCEDURE — 97110 THERAPEUTIC EXERCISES: CPT | Performed by: OCCUPATIONAL THERAPIST

## 2024-07-03 PROCEDURE — 97140 MANUAL THERAPY 1/> REGIONS: CPT | Performed by: OCCUPATIONAL THERAPIST

## 2024-07-03 NOTE — PROGRESS NOTES
Daily Note     Today's date: 7/3/2024  Patient name: Jose Garcia  : 1950  MRN: [id number]  Referring provider: Jerzy Klein MD  Dx:   Encounter Diagnosis     ICD-10-CM    1. Slac (scapholunate advanced collapse) of wrist, right  M19.131                      Subjective: My pain is a 5-6/10 right now and I feel like it swelled up over night.       Objective: See treatment diary below      Assessment: Tolerated treatment well. Patient exhibited good technique with therapeutic exercises. Patient has increased swelling in R wrist. Provided MWM during wrist extension to decrease pain w/ TherEx. Patient required rest breaks during wrist extension w/ 1#. Patient has visit w/ MD this Monday () to discuss SLAC reconstruction surgery.      Plan: Continue per plan of care.      Precautions: SLAC wrist, Severe CMC OA, GOUT      Manuals 6/24 6/28 7/1 7/3          IE 30'            Graston R  4m 4m 4m         Instinsic stretches  4m 4m 4m                      Neuro Re-Ed                          HEP- Wrist AROM, splint reviewed                         Custom wrist  Ortho fit                                                   Ther Ex             Wrist A/AAROM  2m 2m 2m         Foam roll stretch flex/ext    x20         Wrist ext  #1 3x10 #1 3x10 #1 3x10         P/S therabar  Y 3x10 Y 3x10 Y 3x10         Light gripping  GPW 2x30 GPW 2x30 GPW 2x30                                                Ther Activity             opposition                          Gait Training                                       Modalities             MH R 8m 8m 8m 8m         CP post  5m 5m

## 2024-07-08 ENCOUNTER — EVALUATION (OUTPATIENT)
Dept: PHYSICAL THERAPY | Facility: CLINIC | Age: 74
End: 2024-07-08
Payer: MEDICARE

## 2024-07-08 ENCOUNTER — OFFICE VISIT (OUTPATIENT)
Dept: OCCUPATIONAL THERAPY | Facility: CLINIC | Age: 74
End: 2024-07-08
Payer: MEDICARE

## 2024-07-08 ENCOUNTER — OFFICE VISIT (OUTPATIENT)
Dept: OBGYN CLINIC | Facility: CLINIC | Age: 74
End: 2024-07-08
Payer: MEDICARE

## 2024-07-08 VITALS — WEIGHT: 190.6 LBS | BODY MASS INDEX: 29.91 KG/M2 | HEIGHT: 67 IN

## 2024-07-08 DIAGNOSIS — M18.11 PRIMARY OSTEOARTHRITIS OF FIRST CARPOMETACARPAL JOINT OF RIGHT HAND: ICD-10-CM

## 2024-07-08 DIAGNOSIS — G89.29 CHRONIC RIGHT SHOULDER PAIN: Primary | ICD-10-CM

## 2024-07-08 DIAGNOSIS — M19.131 SLAC (SCAPHOLUNATE ADVANCED COLLAPSE) OF WRIST, RIGHT: Primary | ICD-10-CM

## 2024-07-08 DIAGNOSIS — M25.511 CHRONIC RIGHT SHOULDER PAIN: Primary | ICD-10-CM

## 2024-07-08 DIAGNOSIS — M15.9 PRIMARY OSTEOARTHRITIS INVOLVING MULTIPLE JOINTS: ICD-10-CM

## 2024-07-08 PROCEDURE — 97140 MANUAL THERAPY 1/> REGIONS: CPT | Performed by: OCCUPATIONAL THERAPIST

## 2024-07-08 PROCEDURE — 99214 OFFICE O/P EST MOD 30 MIN: CPT | Performed by: ORTHOPAEDIC SURGERY

## 2024-07-08 PROCEDURE — 97110 THERAPEUTIC EXERCISES: CPT | Performed by: OCCUPATIONAL THERAPIST

## 2024-07-08 PROCEDURE — 97161 PT EVAL LOW COMPLEX 20 MIN: CPT | Performed by: PHYSICAL THERAPIST

## 2024-07-08 NOTE — PROGRESS NOTES
ASSESSMENT/PLAN:    Assessment:   GOUT  Right CMC OA  Right SLAC wrist    Plan:   Conservative and operative treatment were discussed at length for PRC and interpositional arthroplasty   It was discussed that we would perform the PRC first and then the interpositional arthroplasty after about 3 months.   Risks and benefits were discussed with the patient at length.   Patient would like to proceed with surgical intervention.  Detailed consent was obtained for right wrist proximal row carpectomy with regional anesthesia  He will follow-up after surgery for suture removal    Follow Up:  After surgery    To Do Next Visit:  Sutures out    General Discussions:  CMC Arthritis: The anatomy and physiology of carpometacarpal joint arthritis was discussed with the patient today in the office.  Deterioration of the articular cartilage eventually leads to hypermobility at the thumb CMC joint, resulting in joint subluxation, osteophyte formation, cystic changes within the trapezium and base of the first metacarpal, as well as subchondral sclerosis.  Eventually, pain, limited mobility, and compensatory hyperextension at the metacarpophalangeal joint may develop.  While normal activity and usage of the thumb joint may provide a painful experience to the patient, this typically does not result in damage to the thumb or hand.  Treatment options include resting thumb spica splints to decreased joint edema, pain, and inflammation.  Therapy exercises to strengthen the thenar musculature may relieve pain, but do not alter the overall continued development of osteoarthritis.  Oral medications, topical medications, corticosteroid injections may decrease pain and increase overall function.  Eventually, approximately 5% of patients may require surgical intervention.         _____________________________________________________  CHIEF COMPLAINT:  Chief Complaint   Patient presents with    Right Thumb - Follow-up     CMC - Beta (5/22/24)     Right Wrist - Follow-up     [facility] wrist injection - Beta (24)         SUBJECTIVE:  Jose Garcia is a 74 y.o. male who presents with right wrist pain. At his previous appointment, he was given a cortisone injection for the radiocarpal joint and cmc joint on the right. He states he did notice an improvement but will still not be able to perform his regular activities of daily living. He states he has been going to therapy which has not provided much relief. He has been using a wrist brace.     PAST MEDICAL HISTORY:  Past Medical History:   Diagnosis Date    Allergic 2015    spring/    Arthritis 2005    Disease of thyroid gland     Hypertension     Kidney stone 2010    one time    Sleep disorder        PAST SURGICAL HISTORY:  Past Surgical History:   Procedure Laterality Date    CHOLECYSTECTOMY      ELBOW SURGERY Left     bursa removed    EYE SURGERY  cataract removal/lens implant    2018    JOINT REPLACEMENT      KNEE SURGERY      LEG WOUND REPAIR / CLOSURE         FAMILY HISTORY:  Family History   Problem Relation Age of Onset    Breast cancer Mother     Cancer Mother     Heart disease Father     Gout Father     Autoimmune disease Sister     Breast cancer Sister     Alcohol abuse Brother     Depression Brother     Stroke Brother     Heart disease Brother     Heart disease Brother     Arthritis Brother     Heart disease Brother     Heart disease Brother     Thyroid disease Daughter     Asthma Daughter     Thyroid disease Daughter     Mental illness Neg Hx        SOCIAL HISTORY:  Social History     Tobacco Use    Smoking status: Former     Current packs/day: 0.00     Types: Cigarettes, Pipe, Cigars     Quit date:      Years since quittin.5    Smokeless tobacco: Never    Tobacco comments:     quit in    Vaping Use    Vaping status: Never Used   Substance Use Topics    Alcohol use: Yes     Alcohol/week: 8.0 standard drinks of alcohol     Types: 7 Glasses of wine, 1 Shots of liquor per week    Drug  "use: No       MEDICATIONS:    Current Outpatient Medications:     Ascorbic Acid (vitamin C) 1000 MG tablet, Take 1,000 mg by mouth daily, Disp: , Rfl:     Cholecalciferol (Vitamin D3) 1.25 MG (54113 UT) CAPS, Take 1.25 mg by mouth in the morning Vitamin D3 1.25 MG (93704GD) CAPS, Disp: , Rfl:     ELDERBERRY PO, Take 1 tablet by mouth daily, Disp: , Rfl:     famotidine (PEPCID) 20 mg tablet, Take 1 tablet (20 mg total) by mouth 2 (two) times a day, Disp: 180 tablet, Rfl: 3    fluorouracil (EFUDEX) 5 % cream, APPLY CREAM TO THE SCALP TWICE DAILY FOR 2 WEEKS. KEEP OUT OF REACH OF CHILDREN/PETS. WILL CAUSE REDNESS AND CRUSTING., Disp: , Rfl:     levothyroxine (Euthyrox) 150 mcg tablet, Take 1 tablet (150 mcg total) by mouth daily in the early morning, Disp: 90 tablet, Rfl: 3    losartan (COZAAR) 100 MG tablet, Take 1 tablet (100 mg total) by mouth daily, Disp: 90 tablet, Rfl: 3    simvastatin (ZOCOR) 20 mg tablet, Take 1 tablet (20 mg total) by mouth daily at bedtime, Disp: 90 tablet, Rfl: 3    traZODone (DESYREL) 150 mg tablet, Take 1 tablet (150 mg total) by mouth daily at bedtime, Disp: 45 tablet, Rfl: 3    vitamin B-12 (VITAMIN B-12) 500 mcg tablet, Take 2 tablets (1,000 mcg total) by mouth daily, Disp: 180 tablet, Rfl: 1    ALLERGIES:  No Known Allergies    REVIEW OF SYSTEMS:  Pertinent items are noted in HPI.  A comprehensive review of systems was negative.    LABS:  HgA1c:   Lab Results   Component Value Date    HGBA1C 6.6 (H) 06/12/2024     BMP:   Lab Results   Component Value Date    CALCIUM 9.4 06/12/2024    K 4.8 06/12/2024    CO2 26 06/12/2024     06/12/2024    BUN 22 06/12/2024    CREATININE 0.95 06/12/2024         _____________________________________________________  PHYSICAL EXAMINATION:  Vital signs: Ht 5' 7\" (1.702 m)   Wt 86.5 kg (190 lb 9.6 oz)   BMI 29.85 kg/m²   General: well developed and well nourished, alert, oriented times 3, and appears comfortable  Psychiatric: Normal  HEENT: Trachea " Midline, No torticollis  Cardiovascular: No discernable arrhythmia  Pulmonary: No wheezing or stridor  Abdomen: No rebound or guarding  Extremities: No peripheral edema  Skin: No masses, erythema, lacerations, fluctation, ulcerations  Neurovascular: Sensation Intact to the Median, Ulnar, Radial Nerve, Motor Intact to the Median, Ulnar, Radial Nerve, and Pulses Intact    MUSCULOSKELETAL EXAMINATION:  Right wrist  No erythema, or ecchymosis noted, moderate swelling noted  Tenderness noted over the radiocarpal tunnel   Limited ROM at the wrist with neutral extension, 10-15 degrees of flexion noted    right CMC Exam:  No adduction contracture  No hyperextension deformity of MCP joint  Positive localized tenderness over radial and dorsal aspect of thumb (CMC joint)  Grind test is Positive for pain and Positive for crepitus  No triggering or tenderness over the A1 pulley  No pain with Finkelstein’s maneuver         _____________________________________________________  STUDIES REVIEWED:  I personally reviewed the x-rays of the right wrist with Dr. Klein which demonstrated SLAC wrist and right thumb CMC arthritis      PROCEDURES PERFORMED:  Procedures  No procedures were performed at today's visit       Scribe Attestation      I,:  Edmund Noriega PA-C am acting as a scribe while in the presence of the attending physician.:       I,:  Jerzy Kleni MD personally performed the services described in this documentation    as scribed in my presence.:

## 2024-07-08 NOTE — PROGRESS NOTES
Daily Note     Today's date: 2024  Patient name: Jose Garcia  : 1950  MRN: [id number]  Referring provider: Jerzy Klein MD  Dx:   Encounter Diagnosis     ICD-10-CM    1. Slac (scapholunate advanced collapse) of wrist, right  M19.131       2. Primary osteoarthritis of first carpometacarpal joint of right hand  M18.11                      Subjective: I paid the price after not wearing my wrist brace this weekend, it hurts.      Objective: See treatment diary below      Assessment: Tolerated treatment well. Patient has had pain relief when wearing his wrist brace.  Pt. Relies on his brace at this point to allow him to use his hand for function without sever pain.  He is pursueing MD appointment today for next course of tx.        Plan: per MD recommendations.     Precautions: SLAC wrist, Severe CMC OA, GOUT      Manuals 6/24 6/28 7/1 7/3 7/8         IE 30'            [place]aston R  4m 4m 4m 4m        Instinsic stretches  4m 4m 4m 4m                     Neuro Re-Ed                          HEP- Wrist AROM, splint reviewed                         Custom wrist  Ortho fit                                                   Ther Ex             Wrist A/AAROM  2m 2m 2m 2m        Foam roll stretch flex/ext    x20 x20        Wrist ext  #1 3x10 #1 3x10 #1 3x10 #1 3x10        P/S therabar  Y 3x10 Y 3x10 Y 3x10 Y 3x10        Light gripping  GPW 2x30 GPW 2x30 GPW 2x30 GPW 2x30                                               Ther Activity             opposition                          Gait Training                                       Modalities             MH R 8m 8m 8m 8m 8m        CP post  5m 5m

## 2024-07-08 NOTE — PROGRESS NOTES
PT Evaluation     Today's date: 2024  Patient name: Jose Garcia  : 1950  MRN: [id number]  Referring provider: Michel Jarrell MD  Dx:   Encounter Diagnosis     ICD-10-CM    1. Chronic right shoulder pain  M25.511     G89.29       2. Primary osteoarthritis of first carpometacarpal joint of right hand  M18.11 Ambulatory Referral to Physical Therapy      3. Primary osteoarthritis involving multiple joints  M15.9 Ambulatory Referral to Physical Therapy                     Assessment  Impairments: abnormal muscle firing, abnormal or restricted ROM, activity intolerance, impaired balance, impaired physical strength, lacks appropriate home exercise program, pain with function and poor body mechanics  Symptom irritability: low    Assessment details: Jose Garcia is a pleasant 74 y.o. male who presents with right shoulder pain. He has right shoulder abduction, IR/ER and elbow extension strength deficits, functional IR/ER ROM deficits, and pain with lifting and quick movements resulting in difficulties participating in personal hobbies.  No further referral appears necessary at this time based upon examination results. I expect he will improve moderately with 8-10 weeks of physical therapy and incorporation of a home exercise program.      Barriers to therapy: n/a  Understanding of Dx/Px/POC: good     Prognosis: good    Goals  ST.  Independent with HEP in 2 weeks  2. Decrease pain by 50% in 3 wks  3.  Increase functional IR ROM to reach T6 in 3 weeks     LT. Achieve FOTO score of 66/100 in 6 weeks   2.  Able to carry 50lb kettle bell at side pain free in right arm in 6 weeks  3.  Right shoulder abduction strength = 5/5 in 6 weeks      Plan  Patient would benefit from: skilled physical therapy  Planned modality interventions: cryotherapy, electrical stimulation/Russian stimulation and thermotherapy: hydrocollator packs    Planned therapy interventions: abdominal trunk stabilization, manual therapy,  "neuromuscular re-education, therapeutic activities, therapeutic exercise, body mechanics training and home exercise program    Frequency: 2x week  Duration in weeks: 6  Treatment plan discussed with: patient      Subjective Evaluation    History of Present Illness  Mechanism of injury: Patient is a 74 y.o. male presenting to physical therapy with right shoulder and arm pain. About two years, both shoulders began hurting progressively more. No KRYS causing this pain. This past March, he dropped something and went to pick it up and he bumped his arm into the vanity. He felt a shooting pain up his arm. He couldn't move his arm for about a day or two, then it began to ease up a little to where he could move his arm. Right shoulder has been a bothersome \"tooth ache\" since.     Referred to PT by PCP, Dr. Jarrell, on 6/27. Has been seeing OT for right wrist/hand, shoulder and arm was still bothering him so he requested a script for PT. Scheduled for right wrist proximal row carpectomy on 8/20. Received xray of the right shoulder this past March. Suspected slight tear in the rotator cuff, did not receive MRI. Bone spurs were noted in the xray. Pain in the shoulders has been the same since March.    Pain is in generalized throughout the shoulder. Rated 2/10 currently. 6/10 at worst. 2/10 at best. Described as an ache, will occasionally feel a sharp pain. Agg with lifting and moving arm too fast. Alleviated with ibuprofen prn. History of sciatica, has self alleviating methods. Denies N/T. Weakness from lack of use. Rarely gets headaches.     Enjoys wood working, walking, hanging out with friends. Lives with wife in one story home with a basement. Both daughters live nearby within 2 miles. Currently independent with ADLs.     Main Concern: don't want muscle atrophy to get any worse  Main Goal: regain strength in the shoulders and arms, get back to wood working        Objective     Neurological Testing     Sensation     Shoulder "   Left Shoulder   Intact: light touch    Right Shoulder   Intact: Light touch    Elbow   Left Elbow   Intact: light touch    Right Elbow   Intact: light touch    Wrist/Hand   Left   Intact: light touch    Right   Intact: light touch    Reflexes   Left   Biceps (C5/C6): normal (2+)  Triceps (C7): normal (2+)    Right   Biceps (C5/C6): normal (2+)  Triceps (C7): normal (2+)    Active Range of Motion   Cervical/Thoracic Spine       Cervical    Left lateral flexion:  Restriction level: moderate  Right lateral flexion:  Restriction level moderate  Left Shoulder   Flexion: WFL  Abduction: WFL  External rotation BTH: T3   Internal rotation BTB: T6     Right Shoulder   Flexion: WFL  Abduction: WFL  External rotation BTH: T1   Internal rotation BTB: T8     Left Elbow   Flexion: WFL  Extension: WFL    Right Elbow   Flexion: WFL  Extension: WFL    Left Wrist   Wrist flexion: WFL  Wrist extension: WFL    Strength/Myotome Testing     Left Shoulder     Planes of Motion   Flexion: 4+   Extension: 4+   Abduction: 4   External rotation at 90°: 4+   Internal rotation at 90°: 4+     Right Shoulder     Planes of Motion   Flexion: 4+   Extension: 4+   Abduction: 4- (Pain at superior lateral shoulder)   External rotation at 90°: 4-   Internal rotation at 90°: 4     Left Elbow   Flexion: 4  Extension: 4    Right Elbow   Flexion: 4  Extension: 4-    Tests     Right Shoulder   Positive active compression (Tamarack) and passive horizontal adduction.   Negative Hawkin's and painful arc.     Additional Tests Details  Negative Infraspinatus Muscle Test             Precautions: Scapholunate advanced collapse  Comorbidities: HTN, Hypothyroidism, GERD, Hypercholesteremia, B12 Deficiency  Primary impairment: Right shoulder abduction, IR/ER strength deficits; right shoulder function IR/ER ROM deficits  Secondary impairment: R elbow extension strength deficits  Main goals: Regain strength in shoulders and arms, get back to wood working  FOTO goal:  53-66 in 11  EPOC: 8/23    HEP:  Access Code: [email or web address]  URL: [email or web address]  Date: 07/08/2024  Prepared by: Preston Land     Exercises  - Standing Single Arm Shoulder Abduction with Resistance  - 2 sets - 10 reps  - Shoulder External Rotation and Scapular Retraction with Resistance  - 2 sets - 10 reps      Manuals             PROM IR/ER GHJ                                                    Neuro Re-Ed                                                    Ther Ex             IR stretch w/ stick             Sleeper Stretch             Table ER stretch             Standing Flexion, Scaption, Abduction             Banded IR             Banded ER             D1/D2 Flexion             D1/D2 Extension             [place] Carry             Box Lifts                                                                              Ther Activity                                       Gait Training                                       Modalities

## 2024-07-12 ENCOUNTER — APPOINTMENT (OUTPATIENT)
Dept: OCCUPATIONAL THERAPY | Facility: CLINIC | Age: 74
End: 2024-07-12
Payer: MEDICARE

## 2024-07-15 ENCOUNTER — APPOINTMENT (OUTPATIENT)
Dept: OCCUPATIONAL THERAPY | Facility: CLINIC | Age: 74
End: 2024-07-15
Payer: MEDICARE

## 2024-07-15 ENCOUNTER — OFFICE VISIT (OUTPATIENT)
Dept: PHYSICAL THERAPY | Facility: CLINIC | Age: 74
End: 2024-07-15
Payer: MEDICARE

## 2024-07-15 DIAGNOSIS — M25.511 CHRONIC RIGHT SHOULDER PAIN: Primary | ICD-10-CM

## 2024-07-15 DIAGNOSIS — M15.9 PRIMARY OSTEOARTHRITIS INVOLVING MULTIPLE JOINTS: ICD-10-CM

## 2024-07-15 DIAGNOSIS — G89.29 CHRONIC RIGHT SHOULDER PAIN: Primary | ICD-10-CM

## 2024-07-15 PROCEDURE — 97140 MANUAL THERAPY 1/> REGIONS: CPT | Performed by: PHYSICAL THERAPIST

## 2024-07-15 PROCEDURE — 97110 THERAPEUTIC EXERCISES: CPT | Performed by: PHYSICAL THERAPIST

## 2024-07-15 NOTE — PROGRESS NOTES
Daily Note     Today's date: 7/15/2024  Patient name: Jose Garcia  : 1950  MRN: [id number]  Referring provider: Michel Jarrell MD  Dx:   Encounter Diagnosis     ICD-10-CM    1. Chronic right shoulder pain  M25.511     G89.29       2. Primary osteoarthritis involving multiple joints  M15.9           Start Time: 0814  Stop Time: 0900  Total time in clinic (min): 46 minutes    Subjective: Pt reported he is doing well. He is having some pain with the banded abduction exercise on his HEP, but he is able to complete it. Believes he may have been putting too much tension on the band during the exercise.       Objective: See treatment diary below      Assessment: Assessed R GHJ and R ACJ mobility today due to time restrictions during evaluation. Normal motion felt with posterior and inferior glide of the R GHJ, as well as R ACJ. Slight relief noted after manual therapy when performing prone T's and standing flexion, scaption, and abduction. Less ROM and greater unsteadiness observed in R arm when compared to L arm during prone I's, Y's, and T's secondary to strength deficits. HEP was updated with prone exercises due to previous exercises increasing patient irritability, plan to assess patient form next visit. Tolerated treatment well. Continued PT is required to address R shoulder strength deficits to progress patient to returning to wood working.       Plan: Plan to assess banded abduction form to determine what may be contributing to patients pain during the exercise.     Precautions: Scapholunate advanced collapse  Comorbidities: HTN, Hypothyroidism, GERD, Hypercholesteremia, B12 Deficiency  Primary impairment: Right shoulder abduction, IR/ER strength deficits; right shoulder function IR/ER ROM deficits  Secondary impairment: R elbow extension strength deficits  Main goals: Regain strength in shoulders and arms, get back to wood working  FOTO goal: 53-66 in 11  EPOC:     HEP:  Access Code: [email or web address]  URL:  "[email or web address]  Date: 07/15/2024  Prepared by: Preston Land    Exercises  - Prone Shoulder Horizontal Abduction  - 1 sets - 10 reps  - Prone Single Arm Shoulder Y  - 1 sets - 10 reps  - Prone Shoulder Flexion  - 1 sets - 10 reps      Manuals 7/15            GHJ PROM 3 min            GHJ AP Mob 6 min            GHJ Inferior Mob 3 min            ACJ Inferior Mob 3 min            Neuro Re-Ed                                                    Ther Ex             IR stretch w/ stick             Sleeper Stretch Against Wall 10\"x 10            Table ER stretch             Prone I's, Y's, T's 1x10 ea            Standing Flexion, Scaption, Abduction 2# 2x10 ea            Banded IR             Banded ER             D1/D2 Flexion             D1/D2 Extension             [place] Carry             Box Lifts                                                                              Ther Activity                                       Gait Training                                       Modalities                                            "

## 2024-07-19 ENCOUNTER — APPOINTMENT (OUTPATIENT)
Dept: OCCUPATIONAL THERAPY | Facility: CLINIC | Age: 74
End: 2024-07-19
Payer: MEDICARE

## 2024-07-19 ENCOUNTER — OFFICE VISIT (OUTPATIENT)
Dept: PHYSICAL THERAPY | Facility: CLINIC | Age: 74
End: 2024-07-19
Payer: MEDICARE

## 2024-07-19 DIAGNOSIS — G89.29 CHRONIC RIGHT SHOULDER PAIN: Primary | ICD-10-CM

## 2024-07-19 DIAGNOSIS — M25.511 CHRONIC RIGHT SHOULDER PAIN: Primary | ICD-10-CM

## 2024-07-19 DIAGNOSIS — M15.9 PRIMARY OSTEOARTHRITIS INVOLVING MULTIPLE JOINTS: ICD-10-CM

## 2024-07-19 PROCEDURE — 97140 MANUAL THERAPY 1/> REGIONS: CPT | Performed by: PHYSICAL THERAPIST

## 2024-07-19 PROCEDURE — 97110 THERAPEUTIC EXERCISES: CPT | Performed by: PHYSICAL THERAPIST

## 2024-07-19 NOTE — PROGRESS NOTES
Daily Note     Today's date: 2024  Patient name: Jose Garcia  : 1950  MRN: [id number]  Referring provider: Michel Jarrell MD  Dx:   Encounter Diagnosis     ICD-10-CM    1. Chronic right shoulder pain  M25.511     G89.29       2. Primary osteoarthritis involving multiple joints  M15.9           Start Time: 0837  Stop Time: 0915  Total time in clinic (min): 38 minutes    Subjective: Pt reported he is doing well. His HEP is going well and performing it daily, no issues performing any of the exercises. Shoulder feels a little bit better than last visit.       Objective: See treatment diary below      Assessment: Visually appeared to be able to gain greater ROM and improved control during supine flexion, scaption, and abduction. Added D2 flexion and serratus wall slides because patient has been responding well to periscapular strengthening and motor control exercises. May benefit from progression of scapular strengthening and motor exercises. Plan to assess activation of upper, middle, and lower traps, as well as serratus, to determine progression of strengthening and motor control of scapular muscles. Tolerated treatment well. Skilled PT continues to be required to address R shoulder strength and motor control deficits to progress patient to returning to wood working.      Plan: Plan to assess banded abduction form to determine what may be contributing to patients pain during the exercise.     Precautions: Scapholunate advanced collapse  Comorbidities: HTN, Hypothyroidism, GERD, Hypercholesteremia, B12 Deficiency  Primary impairment: Scapular motor control deficits, Right shoulder abduction, IR/ER strength deficits; right shoulder functional IR/ER ROM deficits  Secondary impairment: R elbow extension strength deficits  Main goals: Regain strength in shoulders and arms, get back to wood working  FOTO goal: 53-66 in   EPOC:     HEP:  Access Code: [email or web address]  URL:  "[email or web address]  Date: 07/15/2024  Prepared by: Preston Land    Exercises  - Prone Shoulder Horizontal Abduction  - 1 sets - 10 reps  - Prone Single Arm Shoulder Y  - 1 sets - 10 reps  - Prone Shoulder Flexion  - 1 sets - 10 reps      Manuals 7/15 7/19           GHJ PROM 3 min            GHJ AP Mob 6 min 5 min           GHJ Inferior Mob 3 min 3 min           ACJ Inferior Mob 3 min            Neuro Re-Ed                                                    Ther Ex             UBE  3'/3'           IR stretch w/ stick             Sleeper Stretch Against Wall 10\"x 10            Table ER stretch             Prone I's, Y's, T's 1x10 ea 1x10 ea           Standing Flexion, Scaption, Abduction 2# 2x10 ea 2# 2x10 ea           Banded IR             Banded ER             D1/D2 Flexion  OTB D2 2x10 ea           D1/D2 Extension             Serratus Wall Rolls  2x10           [place] Carry             Box Lifts                                                                              Ther Activity                                       Gait Training                                       Modalities                                            "

## 2024-07-22 ENCOUNTER — OFFICE VISIT (OUTPATIENT)
Dept: PHYSICAL THERAPY | Facility: CLINIC | Age: 74
End: 2024-07-22
Payer: MEDICARE

## 2024-07-22 DIAGNOSIS — M25.511 CHRONIC RIGHT SHOULDER PAIN: Primary | ICD-10-CM

## 2024-07-22 DIAGNOSIS — M15.9 PRIMARY OSTEOARTHRITIS INVOLVING MULTIPLE JOINTS: ICD-10-CM

## 2024-07-22 DIAGNOSIS — G89.29 CHRONIC RIGHT SHOULDER PAIN: Primary | ICD-10-CM

## 2024-07-22 PROCEDURE — 97110 THERAPEUTIC EXERCISES: CPT | Performed by: PHYSICAL THERAPIST

## 2024-07-22 PROCEDURE — 97140 MANUAL THERAPY 1/> REGIONS: CPT | Performed by: PHYSICAL THERAPIST

## 2024-07-22 NOTE — PROGRESS NOTES
Daily Note     Today's date: 2024  Patient name: Jose Garcia  : 1950  MRN: [id number]  Referring provider: Michel Jarrell MD  Dx:   Encounter Diagnosis     ICD-10-CM    1. Chronic right shoulder pain  M25.511     G89.29       2. Primary osteoarthritis involving multiple joints  M15.9           Start Time: 0816  Stop Time: 906  Total time in clinic (min): 50 minutes    Subjective: Patient is doing well. He said the shoulder feels okay, he gets occasional pain doing certain things. Feels some soreness when performing the HEP exercises, but he feels it is getting less and less each time. Last night was one of the first nights that he was not awaken by his shoulder pain.       Objective: See treatment diary below      Assessment: Fair contraction of middle and lower traps felt bilaterally during resisted movements. Challenged with increased load during standing sh' flexion, scaption, and abduction. Added ER resistance to serratus wall slides to progress exercise. Banded row was added to HEP to continue to strengthen mid and lower traps. Tolerated treatment well. Skilled PT continues to be required to enhance R shoulder strength and motor control to progress patient to returning to pain free recreational activity.      Plan: Plan to continue to incorporate periscapular strengthening and motor control exercises, such as wall ball stabilization exercises and shoulder taps.      Precautions: Scapholunate advanced collapse  Comorbidities: HTN, Hypothyroidism, GERD, Hypercholesteremia, B12 Deficiency  Primary impairment: Scapular motor control deficits, Right shoulder abduction, IR/ER strength deficits; right shoulder functional IR/ER ROM deficits  Secondary impairment: R elbow extension strength deficits  Main goals: Regain strength in shoulders and arms, get back to wood working  FOTO goal: 53-66 in   EPO:     HEP:  Access Code: [email or web address]  URL: [email or web address]  Date:  "07/15/2024  Prepared by: Preston Land    Exercises  - Prone Shoulder Horizontal Abduction  - 1 sets - 10 reps  - Prone Single Arm Shoulder Y  - 1 sets - 10 reps  - Prone Shoulder Flexion  - 1 sets - 10 reps      Manuals 7/15 7/19 7/22          GHJ PROM 3 min            GHJ AP Mob 6 min 5 min 5 min          GHJ Inferior Mob 3 min 3 min 3 min          ACJ Inferior Mob 3 min            Neuro Re-Ed                                                    Ther Ex             UBE  3'/3' 3'/3'          IR stretch w/ stick             Sleeper Stretch Against Wall 10\"x 10            Table ER stretch             Shoulder Taps             Prone I's, Y's, T's 1x10 ea 1x10 ea 2x10 ea          Standing Flexion, Scaption, Abduction 2# 2x10 ea 2# 2x10 ea 3# 2x10 ea          Banded Row   BTB 2x10          Banded IR             Banded ER             D1/D2 Flexion  OTB D2 2x10 ea           D1/D2 Extension             Serratus Wall Rolls  2x10 YTB ER 2x10          [place] Carry             Box Lifts                                                                              Ther Activity                                       Gait Training                                       Modalities                                            "

## 2024-07-26 ENCOUNTER — APPOINTMENT (OUTPATIENT)
Dept: PHYSICAL THERAPY | Facility: CLINIC | Age: 74
End: 2024-07-26
Payer: MEDICARE

## 2024-08-05 ENCOUNTER — OFFICE VISIT (OUTPATIENT)
Dept: PHYSICAL THERAPY | Facility: CLINIC | Age: 74
End: 2024-08-05
Payer: MEDICARE

## 2024-08-05 DIAGNOSIS — M15.9 PRIMARY OSTEOARTHRITIS INVOLVING MULTIPLE JOINTS: ICD-10-CM

## 2024-08-05 DIAGNOSIS — M25.511 CHRONIC RIGHT SHOULDER PAIN: Primary | ICD-10-CM

## 2024-08-05 DIAGNOSIS — M18.11 PRIMARY OSTEOARTHRITIS OF FIRST CARPOMETACARPAL JOINT OF RIGHT HAND: ICD-10-CM

## 2024-08-05 DIAGNOSIS — G89.29 CHRONIC RIGHT SHOULDER PAIN: Primary | ICD-10-CM

## 2024-08-05 PROCEDURE — 97110 THERAPEUTIC EXERCISES: CPT | Performed by: PHYSICAL THERAPIST

## 2024-08-05 PROCEDURE — 97112 NEUROMUSCULAR REEDUCATION: CPT | Performed by: PHYSICAL THERAPIST

## 2024-08-05 NOTE — PROGRESS NOTES
Daily Note     Today's date: 2024  Patient name: Jose Garcia  : 1950  MRN: [id number]  Referring provider: Michel Jarrell MD  Dx:   Encounter Diagnosis     ICD-10-CM    1. Chronic right shoulder pain  M25.511     G89.29       2. Primary osteoarthritis involving multiple joints  M15.9       3. Primary osteoarthritis of first carpometacarpal joint of right hand  M18.11           Start Time: 0730          Subjective: Intermittently compliant with the exercises while he was on vacation. He continues to get pain at the superior aspect of his shoulder that he cannot tie to any specific activity, it just seems like it will become aching. Also had some acute bilateral proximal biceps pain that has resolved.      Objective: See treatment diary below      Assessment: Tenderness at the AC joint, proximal biceps tendon. Scapular stabilization muscles (lat, mid and lower trap) with poor motor control and weakness. He did well with prone and seated scap retraction with emphasis on depression. Carried this posturing over with banded shoulder ER and ext. Better lat activation with shoulder ext when given tactile cues. HEP updated with scap motor control, plan to progress nv with functional movements. Skilled PT continues to be required to enhance R shoulder strength and motor control to progress patient to returning to pain free recreational activity.      Plan: progress scap motor control with functional activities and lat strengthening     Precautions: Scapholunate advanced collapse  Comorbidities: HTN, Hypothyroidism, GERD, Hypercholesteremia, B12 Deficiency  Primary impairment: Scapular motor control deficits, Right shoulder abduction, IR/ER strength deficits; right shoulder functional IR/ER ROM deficits  Secondary impairment: R elbow extension strength deficits  Main goals: Regain strength in shoulders and arms, get back to wood working  FOTO goal: 53-66 in   EPOC:     HEP:  Access Code: [email or web address]  URL:  "[email or web address]  Date: 08/05/2024  Prepared by: Preston Land    Exercises  - Prone Shoulder Blade Squeeze  - 20 reps  - Shoulder Blade Squeeze  - 20 reps  - Shoulder External Rotation and Scapular Retraction with Resistance  - 20 reps      Manuals 7/15 7/19 7/22 8/5         GHJ PROM 3 min            GHJ AP Mob 6 min 5 min 5 min          GHJ Inferior Mob 3 min 3 min 3 min          ACJ Inferior Mob 3 min                                      Neuro Re-Ed             Prone scap rtrxn    x20         Seated scp rtrxn    x20                                   Ther Ex             UBE  3'/3' 3'/3' 3'/3'         Scap rtrxn +loop ER    X20         Scap rtrxn +sh' ext    Otb x20         IR stretch w/ stick             Sleeper Stretch Against Wall 10\"x 10            Table ER stretch             Shoulder Taps             Prone I's, Y's, T's 1x10 ea 1x10 ea 2x10 ea          Standing Flexion, Scaption, Abduction 2# 2x10 ea 2# 2x10 ea 3# 2x10 ea          Banded Row   BTB 2x10          Banded IR             Banded ER             D1/D2 Flexion  OTB D2 2x10 ea           D1/D2 Extension             Serratus Wall Rolls  2x10 YTB ER 2x10          [place] Carry             Box Lifts                                                                              Ther Activity                                       Gait Training                                       Modalities                                            "

## 2024-08-06 ENCOUNTER — ANESTHESIA EVENT (OUTPATIENT)
Dept: PERIOP | Facility: HOSPITAL | Age: 74
End: 2024-08-06
Payer: MEDICARE

## 2024-08-09 ENCOUNTER — OFFICE VISIT (OUTPATIENT)
Dept: PHYSICAL THERAPY | Facility: CLINIC | Age: 74
End: 2024-08-09
Payer: MEDICARE

## 2024-08-09 DIAGNOSIS — M18.11 PRIMARY OSTEOARTHRITIS OF FIRST CARPOMETACARPAL JOINT OF RIGHT HAND: ICD-10-CM

## 2024-08-09 DIAGNOSIS — M25.511 CHRONIC RIGHT SHOULDER PAIN: Primary | ICD-10-CM

## 2024-08-09 DIAGNOSIS — G89.29 CHRONIC RIGHT SHOULDER PAIN: Primary | ICD-10-CM

## 2024-08-09 DIAGNOSIS — M15.9 PRIMARY OSTEOARTHRITIS INVOLVING MULTIPLE JOINTS: ICD-10-CM

## 2024-08-09 PROCEDURE — 97110 THERAPEUTIC EXERCISES: CPT | Performed by: PHYSICAL THERAPIST

## 2024-08-09 NOTE — PROGRESS NOTES
Daily Note     Today's date: 2024  Patient name: Jose Garcia  : 1950  MRN: [id number]  Referring provider: Michel Jarrell MD  Dx:   Encounter Diagnosis     ICD-10-CM    1. Chronic right shoulder pain  M25.511     G89.29       2. Primary osteoarthritis involving multiple joints  M15.9       3. Primary osteoarthritis of first carpometacarpal joint of right hand  M18.11           Start Time: 918          Subjective: Has been doing the scap retractions and shoulder pain that he's had for 1+ years has been improving for the first time. He's been able to lie on his side for more than a few minutes without pain at night.      Objective: See treatment diary below      Assessment: Good motor control with prone and seated scap retraction+depression. Added hold today for endurance, which he was appropriately challenged with. Added supine protraction for serratus, good motor control. Updated HEP with the retraction holds and plan to progress shoulder strengthening with emphasis on this nv. Skilled PT continues to be required to enhance R shoulder strength and motor control to progress patient to returning to pain free recreational activity.      Plan: progress scap motor control with functional activities and lat strengthening     Precautions: Scapholunate advanced collapse  Comorbidities: HTN, Hypothyroidism, GERD, Hypercholesteremia, B12 Deficiency  Primary impairment: Scapular motor control deficits, Right shoulder abduction, IR/ER strength deficits; right shoulder functional IR/ER ROM deficits  Secondary impairment: R elbow extension strength deficits  Main goals: Regain strength in shoulders and arms, get back to wood working  FOTO goal: 53-66 in 11  EPO:     HEP:  Access Code: [email or web address]  URL: [email or web address]  Date: 2024  Prepared by: Preston Land    Exercises  - Prone Shoulder Blade Squeeze  - 20 reps  - Shoulder Blade Squeeze  - 20 reps  - Shoulder External Rotation and  "Scapular Retraction with Resistance  - 20 reps      Manuals 7/15 7/19 7/22 8/5 8/9        GHJ PROM 3 min            GHJ AP Mob 6 min 5 min 5 min          GHJ Inferior Mob 3 min 3 min 3 min          ACJ Inferior Mob 3 min                                      Neuro Re-Ed             Prone scap rtrxn    x20 5\"x20        Seated scp rtrxn    x20 5\"x20                                  Ther Ex             UBE  3'/3' 3'/3' 3'/3' 3'/3'        Scap rtrxn +loop ER    X20 x20        Scap rtrxn +sh' ext    Otb x20 Ytb 3x10        Sleeper Stretch Against Wall 10\"x 10            Prone I's, Y's, T's 1x10 ea 1x10 ea 2x10 ea          Standing Flexion, Scaption, Abduction 2# 2x10 ea 2# 2x10 ea 3# 2x10 ea          Banded Row   BTB 2x10  Gtb x20        Serratus punch     5\"x20        Banded IR             Banded ER             D1/D2 Flexion  OTB D2 2x10 ea           D1/D2 Extension             Serratus Wall Rolls  2x10 YTB ER 2x10          [place] Carry             Box Lifts                                                                              Ther Activity                                       Gait Training                                       Modalities                                            "

## 2024-08-12 ENCOUNTER — OFFICE VISIT (OUTPATIENT)
Dept: PHYSICAL THERAPY | Facility: CLINIC | Age: 74
End: 2024-08-12
Payer: MEDICARE

## 2024-08-12 DIAGNOSIS — M15.9 PRIMARY OSTEOARTHRITIS INVOLVING MULTIPLE JOINTS: ICD-10-CM

## 2024-08-12 DIAGNOSIS — M18.11 PRIMARY OSTEOARTHRITIS OF FIRST CARPOMETACARPAL JOINT OF RIGHT HAND: ICD-10-CM

## 2024-08-12 DIAGNOSIS — M25.511 CHRONIC RIGHT SHOULDER PAIN: Primary | ICD-10-CM

## 2024-08-12 DIAGNOSIS — G89.29 CHRONIC RIGHT SHOULDER PAIN: Primary | ICD-10-CM

## 2024-08-12 PROCEDURE — 97110 THERAPEUTIC EXERCISES: CPT | Performed by: PHYSICAL THERAPIST

## 2024-08-12 NOTE — PROGRESS NOTES
Daily Note     Today's date: 2024  Patient name: Jose Garcia  : 1950  MRN: [id number]  Referring provider: Michel Jarrell MD  Dx:   Encounter Diagnosis     ICD-10-CM    1. Chronic right shoulder pain  M25.511     G89.29       2. Primary osteoarthritis involving multiple joints  M15.9       3. Primary osteoarthritis of first carpometacarpal joint of right hand  M18.11           Start Time: 0733          Subjective: Doing well with the shoulder since adding the scap retraction +depression exercises.      Objective: See treatment diary below      Assessment: Good motor control with prone and seated scap retraction+depression. Continued with hold today for endurance, which he was appropriately challenged with. Added load to supine protraction for serratus, good motor control. Plan to progress shoulder strengthening with emphasis on this nv. Skilled PT continues to be required to enhance R shoulder strength and motor control to progress patient to returning to pain free recreational activity.      Plan: progress scap motor control with functional activities and lat strengthening     Precautions: Scapholunate advanced collapse  Comorbidities: HTN, Hypothyroidism, GERD, Hypercholesteremia, B12 Deficiency  Primary impairment: Scapular motor control deficits, Right shoulder abduction, IR/ER strength deficits; right shoulder functional IR/ER ROM deficits  Secondary impairment: R elbow extension strength deficits  Main goals: Regain strength in shoulders and arms, get back to wood working  FOTO goal: 53-66 in 11  EPO:     HEP:  Access Code: [email or web address]  URL: [email or web address]  Date: 2024  Prepared by: Preston Land    Exercises  - Prone Shoulder Blade Squeeze  - 20 reps  - Shoulder Blade Squeeze  - 20 reps  - Shoulder External Rotation and Scapular Retraction with Resistance  - 20 reps      Manuals 7/15 7/19 7/22 8/5 8/9 8/12       GHJ PROM 3 min            GHJ AP Mob 6 min 5 min 5  "min          GHJ Inferior Mob 3 min 3 min 3 min          ACJ Inferior Mob 3 min                                      Neuro Re-Ed             Prone scap rtrxn    x20 5\"x20 5\"x20       Seated scp rtrxn    x20 5\"x20                                  Ther Ex             UBE  3'/3' 3'/3' 3'/3' 3'/3' 3'/3'       Scap rtrxn +loop ER    X20 Ytb 3x10 Ytb 5\"x20       Scap rtrxn +sh' ext    Otb x20 Gtb x20 gtb 5\"x20       Sleeper Stretch Against Wall 10\"x 10            Prone I's, Y's, T's 1x10 ea 1x10 ea 2x10 ea          Standing Flexion, Scaption, Abduction 2# 2x10 ea 2# 2x10 ea 3# 2x10 ea          Banded Row   BTB 2x10          Serratus punch     5\"x20 1# 5\"x20       Banded IR             Banded ER             D1/D2 Flexion  OTB D2 2x10 ea           D1/D2 Extension             Serratus Wall Rolls  2x10 YTB ER 2x10          [place] Carry             Box Lifts                                                                              Ther Activity                                       Gait Training                                       Modalities                                            "

## 2024-08-14 NOTE — PRE-PROCEDURE INSTRUCTIONS
Pre-Surgery Instructions:   Medication Instructions    Ascorbic Acid (vitamin C) 1000 MG tablet Stop taking 7 days prior to surgery.    Cholecalciferol (Vitamin D3) 1.25 MG (09812 UT) CAPS Stop taking 7 days prior to surgery.    ELDERBERRY PO Stop taking 7 days prior to surgery.    famotidine (PEPCID) 20 mg tablet Take day of surgery.    levothyroxine (Euthyrox) 150 mcg tablet Take day of surgery.    losartan (COZAAR) 100 MG tablet Hold day of surgery.    simvastatin (ZOCOR) 20 mg tablet Take night before surgery    traZODone (DESYREL) 150 mg tablet Take night before surgery    vitamin B-12 (VITAMIN B-12) 500 mcg tablet Stop taking 7 days prior to surgery.      Medication instructions for day surgery reviewed. Please use only a sip of water to take your instructed medications. Avoid all over the counter vitamins, supplements and NSAIDS for one week prior to surgery per anesthesia guidelines. Tylenol is ok to take as needed.     You will receive a call one business day prior to surgery with an arrival time and hospital directions. If your surgery is scheduled on a Monday, the hospital will be calling you on the Friday prior to your surgery. If you have not heard from anyone by 8pm, please call the hospital supervisor through the hospital  at [phone]. ([place] [phone] or [place] [phone]).    Do not eat or drink anything after midnight the night before your surgery, including candy, mints, lifesavers, or chewing gum. Do not drink alcohol 24hrs before your surgery. Try not to smoke at least 24hrs before your surgery.       Follow the pre surgery showering instructions as listed in the “My Surgical Experience Booklet” or otherwise provided by your surgeon's office. Do not use a blade to shave the surgical area 1 week before surgery. It is okay to use a clean electric clippers up to 24 hours before surgery. Do not apply any lotions, creams, including makeup, cologne, deodorant, or perfumes after  showering on the day of your surgery. Do not use dry shampoo, hair spray, hair gel, or any type of hair products.     No contact lenses, eye make-up, or artificial eyelashes. Remove nail polish, including gel polish, and any artificial, gel, or acrylic nails if possible. Remove all jewelry including rings and body piercing jewelry.     Wear causal clothing that is easy to take on and off. Consider your type of surgery.    Keep any valuables, jewelry, piercings at home. Please bring any specially ordered equipment (sling, braces) if indicated.    Arrange for a responsible person to drive you to and from the hospital on the day of your surgery. Please confirm the visitor policy for the day of your procedure when you receive your phone call with an arrival time.     Call the surgeon's office with any new illnesses, exposures, or additional questions prior to surgery.    Please reference your “My Surgical Experience Booklet” for additional information to prepare for your upcoming surgery.

## 2024-08-15 ENCOUNTER — APPOINTMENT (OUTPATIENT)
Dept: RADIOLOGY | Facility: HOSPITAL | Age: 74
End: 2024-08-15
Payer: MEDICARE

## 2024-08-15 ENCOUNTER — HOSPITAL ENCOUNTER (OUTPATIENT)
Facility: HOSPITAL | Age: 74
Setting detail: OUTPATIENT SURGERY
Discharge: HOME | End: 2024-08-15
Attending: ORTHOPAEDIC SURGERY | Admitting: ORTHOPAEDIC SURGERY
Payer: MEDICARE

## 2024-08-15 VITALS
SYSTOLIC BLOOD PRESSURE: 133 MMHG | DIASTOLIC BLOOD PRESSURE: 65 MMHG | HEART RATE: 100 BPM | RESPIRATION RATE: 22 BRPM | HEIGHT: 67 IN | BODY MASS INDEX: 29.35 KG/M2 | WEIGHT: 187 LBS | TEMPERATURE: 98 F | OXYGEN SATURATION: 93 %

## 2024-08-15 DIAGNOSIS — M19.131 SLAC (SCAPHOLUNATE ADVANCED COLLAPSE) OF WRIST, RIGHT: ICD-10-CM

## 2024-08-15 PROCEDURE — 73100 X-RAY EXAM OF WRIST: CPT

## 2024-08-15 PROCEDURE — C9290 INJ, BUPIVACAINE LIPOSOME: HCPCS | Performed by: ANESTHESIOLOGY

## 2024-08-15 PROCEDURE — 25215 REMOVAL OF WRIST BONES: CPT | Performed by: ORTHOPAEDIC SURGERY

## 2024-08-15 PROCEDURE — 88311 DECALCIFY TISSUE: CPT | Performed by: PATHOLOGY

## 2024-08-15 PROCEDURE — 88304 TISSUE EXAM BY PATHOLOGIST: CPT | Performed by: PATHOLOGY

## 2024-08-15 PROCEDURE — 25310 TRANSPLANT FOREARM TENDON: CPT | Performed by: ORTHOPAEDIC SURGERY

## 2024-08-15 PROCEDURE — 64772 INCISION OF SPINAL NERVE: CPT | Performed by: ORTHOPAEDIC SURGERY

## 2024-08-15 PROCEDURE — NC001 PR NO CHARGE: Performed by: ORTHOPAEDIC SURGERY

## 2024-08-15 PROCEDURE — 88333 PATH CONSLTJ SURG CYTO XM 1: CPT | Performed by: PATHOLOGY

## 2024-08-15 PROCEDURE — 88307 TISSUE EXAM BY PATHOLOGIST: CPT | Performed by: PATHOLOGY

## 2024-08-15 RX ORDER — TRAMADOL HYDROCHLORIDE 50 MG/1
50 TABLET ORAL EVERY 6 HOURS PRN
Status: DISCONTINUED | OUTPATIENT
Start: 2024-08-15 | End: 2024-08-15 | Stop reason: HOSPADM

## 2024-08-15 RX ORDER — PROPOFOL 10 MG/ML
INJECTION, EMULSION INTRAVENOUS AS NEEDED
Status: DISCONTINUED | OUTPATIENT
Start: 2024-08-15 | End: 2024-08-15

## 2024-08-15 RX ORDER — SODIUM CHLORIDE, SODIUM LACTATE, POTASSIUM CHLORIDE, CALCIUM CHLORIDE 600; 310; 30; 20 MG/100ML; MG/100ML; MG/100ML; MG/100ML
INJECTION, SOLUTION INTRAVENOUS CONTINUOUS PRN
Status: DISCONTINUED | OUTPATIENT
Start: 2024-08-15 | End: 2024-08-15

## 2024-08-15 RX ORDER — IBUPROFEN 200 MG
400 TABLET ORAL EVERY 6 HOURS PRN
COMMUNITY

## 2024-08-15 RX ORDER — ONDANSETRON 2 MG/ML
INJECTION INTRAMUSCULAR; INTRAVENOUS AS NEEDED
Status: DISCONTINUED | OUTPATIENT
Start: 2024-08-15 | End: 2024-08-15

## 2024-08-15 RX ORDER — EPHEDRINE SULFATE 50 MG/ML
INJECTION INTRAVENOUS AS NEEDED
Status: DISCONTINUED | OUTPATIENT
Start: 2024-08-15 | End: 2024-08-15

## 2024-08-15 RX ORDER — FENTANYL CITRATE 50 UG/ML
INJECTION, SOLUTION INTRAMUSCULAR; INTRAVENOUS
Status: COMPLETED | OUTPATIENT
Start: 2024-08-15 | End: 2024-08-15

## 2024-08-15 RX ORDER — CEFAZOLIN SODIUM 2 G/50ML
2000 SOLUTION INTRAVENOUS ONCE
Status: COMPLETED | OUTPATIENT
Start: 2024-08-15 | End: 2024-08-15

## 2024-08-15 RX ORDER — FENTANYL CITRATE/PF 50 MCG/ML
25 SYRINGE (ML) INJECTION
Status: DISCONTINUED | OUTPATIENT
Start: 2024-08-15 | End: 2024-08-15 | Stop reason: HOSPADM

## 2024-08-15 RX ORDER — SENNOSIDES 8.6 MG
650 CAPSULE ORAL EVERY 8 HOURS PRN
Qty: 30 TABLET | Refills: 0 | Status: SHIPPED | OUTPATIENT
Start: 2024-08-15

## 2024-08-15 RX ORDER — COVID-19 ANTIGEN TEST
220 KIT MISCELLANEOUS 2 TIMES DAILY
Qty: 60 CAPSULE | Refills: 0 | Status: SHIPPED | OUTPATIENT
Start: 2024-08-15 | End: 2024-09-14

## 2024-08-15 RX ORDER — BUPIVACAINE HYDROCHLORIDE 5 MG/ML
INJECTION, SOLUTION EPIDURAL; INTRACAUDAL
Status: COMPLETED | OUTPATIENT
Start: 2024-08-15 | End: 2024-08-15

## 2024-08-15 RX ORDER — MAGNESIUM HYDROXIDE 1200 MG/15ML
LIQUID ORAL AS NEEDED
Status: DISCONTINUED | OUTPATIENT
Start: 2024-08-15 | End: 2024-08-15 | Stop reason: HOSPADM

## 2024-08-15 RX ORDER — DEXAMETHASONE SODIUM PHOSPHATE 10 MG/ML
INJECTION, SOLUTION INTRAMUSCULAR; INTRAVENOUS AS NEEDED
Status: DISCONTINUED | OUTPATIENT
Start: 2024-08-15 | End: 2024-08-15

## 2024-08-15 RX ORDER — ONDANSETRON 2 MG/ML
4 INJECTION INTRAMUSCULAR; INTRAVENOUS EVERY 6 HOURS PRN
Status: DISCONTINUED | OUTPATIENT
Start: 2024-08-15 | End: 2024-08-15 | Stop reason: HOSPADM

## 2024-08-15 RX ORDER — TRAMADOL HYDROCHLORIDE 50 MG/1
50 TABLET ORAL EVERY 6 HOURS PRN
Qty: 10 TABLET | Refills: 0 | Status: SHIPPED | OUTPATIENT
Start: 2024-08-15

## 2024-08-15 RX ORDER — ACETAMINOPHEN 325 MG/1
650 TABLET ORAL EVERY 6 HOURS PRN
Status: DISCONTINUED | OUTPATIENT
Start: 2024-08-15 | End: 2024-08-15 | Stop reason: HOSPADM

## 2024-08-15 RX ORDER — MIDAZOLAM HYDROCHLORIDE 2 MG/2ML
INJECTION, SOLUTION INTRAMUSCULAR; INTRAVENOUS
Status: COMPLETED | OUTPATIENT
Start: 2024-08-15 | End: 2024-08-15

## 2024-08-15 RX ADMIN — EPHEDRINE SULFATE 10 MG: 50 INJECTION INTRAVENOUS at 13:44

## 2024-08-15 RX ADMIN — EPHEDRINE SULFATE 10 MG: 50 INJECTION INTRAVENOUS at 13:50

## 2024-08-15 RX ADMIN — SODIUM CHLORIDE, SODIUM LACTATE, POTASSIUM CHLORIDE, AND CALCIUM CHLORIDE: .6; .31; .03; .02 INJECTION, SOLUTION INTRAVENOUS at 13:33

## 2024-08-15 RX ADMIN — CEFAZOLIN SODIUM 2000 MG: 2 SOLUTION INTRAVENOUS at 13:31

## 2024-08-15 RX ADMIN — BUPIVACAINE 20 ML: 13.3 INJECTION, SUSPENSION, LIPOSOMAL INFILTRATION at 13:11

## 2024-08-15 RX ADMIN — ONDANSETRON 4 MG: 2 INJECTION INTRAMUSCULAR; INTRAVENOUS at 15:01

## 2024-08-15 RX ADMIN — DEXAMETHASONE SODIUM PHOSPHATE 10 MG: 10 INJECTION, SOLUTION INTRAMUSCULAR; INTRAVENOUS at 13:35

## 2024-08-15 RX ADMIN — MIDAZOLAM 1 MG: 1 INJECTION INTRAMUSCULAR; INTRAVENOUS at 13:06

## 2024-08-15 RX ADMIN — BUPIVACAINE HYDROCHLORIDE 10 ML: 5 INJECTION, SOLUTION EPIDURAL; INTRACAUDAL; PERINEURAL at 13:11

## 2024-08-15 RX ADMIN — FENTANYL CITRATE 50 MCG: 50 INJECTION, SOLUTION INTRAMUSCULAR; INTRAVENOUS at 13:06

## 2024-08-15 RX ADMIN — EPHEDRINE SULFATE 15 MG: 50 INJECTION INTRAVENOUS at 13:57

## 2024-08-15 RX ADMIN — PROPOFOL 150 MG: 10 INJECTION, EMULSION INTRAVENOUS at 13:35

## 2024-08-15 NOTE — ANESTHESIA POSTPROCEDURE EVALUATION
Post-Op Assessment Note    CV Status:  Stable    Pain management: adequate       Mental Status:  Alert and awake   Hydration Status:  Euvolemic   PONV Controlled:  Controlled   Airway Patency:  Patent  Two or more mitigation strategies used for obstructive sleep apnea. There is a medical reason for not screening for obstructive sleep apnea and/or for not using two or more mitigation strategies   Post Op Vitals Reviewed: Yes      Staff: with CRNAs, Anesthesiologist               BP   142/78   Temp 97   Pulse 79   Resp 12   SpO2 100

## 2024-08-15 NOTE — H&P
H&P Exam - Orthopedics   Jose Garcia 74 y.o. 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 male who presents with right wrist and base of thumb pain this has been going on for years stedily worsening and a few weeks ago he was unable to even move the right wrist without severe discomfort. He notes stiffness to right wrist as well. He has failed conservative management in the form of medications and steroid injections in the past, as well as ice and OTC pain medications.    Historical Information  Review Of Systems:   Skin: Normal  Neuro: See HPI  Musculoskeletal: See HPI  14 point review of systems negative except as stated above     Past Medical History:   Past Medical History:   Diagnosis Date    Allergic 2015    spring/fall    Arthritis 2005    Disease of thyroid gland     Hypertension     Kidney stone 2010    one time    Sleep disorder        Past Surgical History:   Past Surgical History:   Procedure Laterality Date    CHOLECYSTECTOMY      ELBOW SURGERY Left     bursa removed    EYE SURGERY  cataract removal/lens implant    2018    JOINT REPLACEMENT      KNEE SURGERY      LEG WOUND REPAIR / CLOSURE         Family History:  Family history reviewed and non-contributory  Family History   Problem Relation Age of Onset    Breast cancer Mother     Cancer Mother     Heart disease Father     Gout Father     Autoimmune disease Sister     Breast cancer Sister     Alcohol abuse Brother     Depression Brother     Stroke Brother     Heart disease Brother     Heart disease Brother     Arthritis Brother     Heart disease Brother     Heart disease Brother     Thyroid disease Daughter     Asthma  Daughter     Thyroid disease Daughter     Mental illness Neg Hx        Social History:  Social History     Socioeconomic History    Marital status: /Civil Union     Spouse name: None    Number of children: None    Years of education: None    Highest education level: None   Occupational History    None   Tobacco Use    Smoking status: Former     Current packs/day: 0.00     Types: Cigarettes, Pipe, Cigars     Quit date:      Years since quittin.6    Smokeless tobacco: Never    Tobacco comments:     quit in    Vaping Use    Vaping status: Never Used   Substance and Sexual Activity    Alcohol use: Not Currently     Alcohol/week: 8.0 standard drinks of alcohol     Types: 7 Glasses of wine, 1 Shots of liquor per week    Drug use: No    Sexual activity: Not Currently     Partners: Female     Birth control/protection: Male Sterilization   Other Topics Concern    None   Social History Narrative    Lives with spouse.     Social Determinants of Health     Financial Resource Strain: Low Risk  (2023)    Overall Financial Resource Strain (CARDIA)     Difficulty of Paying Living Expenses: Not hard at all   Food Insecurity: No Food Insecurity (2024)    Hunger Vital Sign     Worried About Running Out of Food in the Last Year: Never true     Ran Out of Food in the Last Year: Never true   Transportation Needs: No Transportation Needs (2024)    PRAPARE - Transportation     Lack of Transportation (Medical): No     Lack of Transportation (Non-Medical): No   Physical Activity: Not on file   Stress: Not on file   Social Connections: Not on file   Intimate Partner Violence: Not on file   Housing Stability: Low Risk  (2024)    Housing Stability Vital Sign     Unable to Pay for Housing in the Last Year: No     Number of Times Moved in the Last Year: 0     Homeless in the Last Year: No       Allergies:   No Known Allergies        Labs:  0   Lab Value Date/Time    HCT 37.2 2024 0723    HCT 40.2  "06/07/2023 0703    HCT 34.3 (L) 12/21/2022 0705    HGB 11.7 (L) 06/12/2024 0723    HGB 12.8 06/07/2023 0703    HGB 11.2 (L) 12/21/2022 0705    WBC 6.54 06/12/2024 0723    WBC 6.44 06/07/2023 0703    WBC 6.16 12/21/2022 0705       Meds:    Current Facility-Administered Medications:     bupivacaine liposomal (EXPAREL) 1.3 % injection 10 mL, 10 mL, Perineural, Once, Shukri Gilmore DO    ceFAZolin (ANCEF) IVPB (premix in dextrose) 2,000 mg 50 mL, 2,000 mg, Intravenous, Once, Edmund Noriega PA-C    Blood Culture:   No results found for: \"BLOODCX\"    Wound Culture:   No results found for: \"WOUNDCULT\"    Ins and Outs:  No intake/output data recorded.            Physical Exam  /73   Pulse 74   Temp 97.7 °F (36.5 °C) (Temporal)   Resp 18   Ht 5' 7\" (1.702 m)   Wt 84.8 kg (187 lb)   SpO2 97%   BMI 29.29 kg/m²   /73   Pulse 74   Temp 97.7 °F (36.5 °C) (Temporal)   Resp 18   Ht 5' 7\" (1.702 m)   Wt 84.8 kg (187 lb)   SpO2 97%   BMI 29.29 kg/m²   Gen: No acute distress, resting comfortably in bed  HEENT: Eyes clear, moist mucus membranes, hearing intact  Respiratory: No audible wheezing or stridor  Cardiovascular: Well Perfused peripherally, 2+ distal pulse  Abdomen: nondistended, no peritoneal signs  Ortho Exam:   Skin intact  No swelling  Boggy synovitis to right wrist region  TTP right wrist both dorsally and volarly over midcarpal region  Pain with palpation of thumb cmc joint  Right wrist motino about 10 deg extension and 20 deg flexion minimal ulnar radial deviation  + grind test  Paindul ROM of right wrist  2+ radial pulse hand warm perfused      Neuro Exam:   Sensation intact to light touch median radial ulnar distributions prior to block taking effect    5/5 motor m r u prior to block taking effect    Lab Results: Reviewed  Imaging: Reviewed    "

## 2024-08-15 NOTE — ANESTHESIA PROCEDURE NOTES
Peripheral Block    Patient location during procedure: holding area  Start time: 8/15/2024 1:11 PM  Reason for block: procedure for pain, at surgeon's request and post-op pain management  Staffing  Performed by: Ludivina Cole MD  Authorized by: Ludivina Cole MD    Preanesthetic Checklist  Completed: patient identified, IV checked, site marked, risks and benefits discussed, surgical consent, monitors and equipment checked, pre-op evaluation and timeout performed  Peripheral Block  Patient position: supine  Prep: ChloraPrep  Patient monitoring: continuous pulse oximetry, frequent blood pressure checks and heart rate  Block type: Supraclavicular  Laterality: right  Injection technique: single-shot  Procedures: ultrasound guided, Ultrasound guidance required for the procedure to increase accuracy and safety of medication placement and decrease risk of complications.  Ultrasound permanent image saved  bupivacaine (PF) (MARCAINE) 0.5 % injection 20 mL - Perineural   10 mL - 8/15/2024 1:11:00 PM  bupivacaine liposomal (EXPAREL) 1.3 % injection 20 mL - Perineural   20 mL - 8/15/2024 1:11:00 PM  fentanyl citrate (PF) 100 MCG/2ML 50 mcg - Intravenous   50 mcg - 8/15/2024 1:06:00 PM  midazolam (VERSED) injection 0.5 mg - Intravenous   1 mg - 8/15/2024 1:06:00 PM  Needle  Needle type: Stimuplex   Needle gauge: 22 G  Needle length: 4 in  Needle localization: ultrasound guidance  Needle insertion depth: 2 cm  Assessment  Injection assessment: frequent aspiration, incremental injection, injected with ease, needle tip visualized at all times, negative aspiration, negative for heart rate change, no paresthesia on injection and no symptoms of intraneural/intravenous injection  Paresthesia pain: none  Post-procedure:  site cleaned  patient tolerated the procedure well with no immediate complications

## 2024-08-15 NOTE — ANESTHESIA PREPROCEDURE EVALUATION
Procedure:  Right wrist proximal row carpectomy (Right: Wrist)    Relevant Problems   CARDIO   (+) Essential hypertension   (+) Pure hypercholesterolemia      ENDO   (+) Acquired hypothyroidism      GI/HEPATIC   (+) Gastroesophageal reflux disease without esophagitis      Ear/Nose/Throat   (+) Seasonal allergic rhinitis             Anesthesia Plan  ASA Score- 2     Anesthesia Type- general with ASA Monitors.         Additional Monitors:     Airway Plan:     Comment: GA with LMA, IV, antiemetics  Right supraclavicular nerve block for postop pain control as requested by surgeon  .       Plan Factors-    Induction- intravenous.    Postoperative Plan- . Planned trial extubation        Informed Consent- Anesthetic plan and risks discussed with patient.  I personally reviewed this patient with the CRNA. Discussed and agreed on the Anesthesia Plan with the CRNA..

## 2024-08-15 NOTE — OP NOTE
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]    Procedure(s) (LRB):  Right wrist proximal row carpectomy (Right)  Posterior interosseous nerve neurectomy (Right)  EPL tendon transposition (Right)  Application of short arm splint (Right)    Specimen(s):  Order Name Source Comment Collection Info Order Time   TISSUE EXAM Joint, Right Wrist Proximal Carpal Row Bone- Right Wrist  Collected By: Jerzy Klein MD 8/15/2024  2:41 PM     Release to patient through Mychart   Immediate            Estimated Blood Loss:   Minimal      Anesthesia Type:   Regional with Sedation    Operative Indications:  The patient has a history of right wrist scapholunate advanced collapse that was recalcitrant to conservative management.  The decision was made to bring the patient to the operating room for right wrist proximal row carpectomy.  Risks of the procedure were explained which include, but are not limited to bleeding; infection; damage to nerves, arteries,veins, tendons; scar; pain; need for reoperation; failure to give desired result; and risks of anaesthesia.  All questions were answered to satisfaction and they were willing to proceed.         Operative Findings:  Intact articular cartilage to the proximal pole of the capitate, significant arthritic change consistent with scapholunate advanced collapse to the scaphoid and scaphoid facet of the distal radius    Complications:   None    Procedure and Technique:  After the patient, site, and procedure were identified, the patient was brought into the operating room in a supine position.  Regional and general anaesthesia were  provided.  A well padded tourniquet was applied to the extremity, set at 250 mmHg.  The  right upper extremity was then prepped and drapped in a normal, sterile, orthopedic fashion.    After the patient, site, and procedure identified attention was turned towards the right arm.  An Esmarch Salvador bandage was used to exsanguinate the limb and the tourniquet was inflated to 250 mmHg.  Dorsal longitudinal incision was made to the wrist.  We dissected down through the skin and subcutaneous tissues maintaining hemostasis and protecting the super and deeper neurovascular structures.  Full-thickness radial and ulnar based flaps were identified.  Extensor retinaculum was identified and opened in line with the third extensor compartment.  Second extensor compartment was then opened with a radial based flap and fourth and fifth extensor compartment were then opened with an ulnar based flap.  Extensor tendons and retinacular tissue were protected throughout the entirety of the procedure.  2 cm posterior interosseous nerve neurectomy was then performed utilizing a crush, burn, cut technique.  A T-shaped capsulotomy was then performed.  Examination of the wrist demonstrated intact articular cartilage at the proximal pole the capitate intact articular cartilage over the lunate facet of the distal radius.  There was significant arthritic change localized over the proximal and distal pole the scaphoid as well as the scaphoid facet of the distal radius.  Significant synovitis was noted as well.  There was no fluid noted within the wrist joint to allow for crystal analysis, however samples from the synovium and surrounding tissues were sent for crystal analysis.  At this point in time, utilizing the Hardwick knives and scalpel's, we excised the lunate, triquetrum, and scaphoid in their entirety.  Care was taken to preserve and protect the radioscaphocapitate ligament which was verified intact at the completion of the procedure.  There  was no evidence of damage to the articular surface of the lunate facet or proximal pole of the capitate or hamate at the completion of the procedure.  The capitate sat nicely within the lunate facet and intraoperative fluoroscopic images confirmed good placement.  Wound was copiously irrigated with sterile saline solution capsule was then closed utilizing Ethibond in an interrupted fashion.  Wound was then irrigated a second time, extensor retinaculum was repaired with the extensor pollicis longus tendon left transposed dorsally superficially and radially.  Tourniquet was deflated demonstrating rapid restoration of blood supply to the hand.    At the completion of the procedure, hemostasis was obtained with cautery and direct pressure.  The wounds were copiously irrigated with sterile solution.  The wounds were closed with Vicryl and Prolene.  Sterile dressings were applied, including Xeroform, gauze, tweeners, webril, ACE and Volar Splint.  Please note, all sponge, needle, and instrument counts were correct prior to closure.  Loupe magnification was utilized.  The patient tolerated the procedure well.     I was present for all critical portions of the procedure.    Patient Disposition:  PACU , hemodynamically stable, and extubated and stable    SIGNATURE: Jerzy Klein MD  DATE: 08/15/24  TIME: 3:46 PM

## 2024-08-15 NOTE — DISCHARGE INSTR - AVS FIRST PAGE
Post Operative Instructions    You have had surgery on your arm today, please read and follow the information below:  Elevate your hand above your elbow during the next 24-48 hours to help with swelling.  Place your hand and arm over your head with motion at your shoulder three times a day.  Do not apply any cream/ointment/oil to your incisions including antibiotics.  Do not soak your hands in standing water (dishwater, tubs, Jacuzzi's, pools, etc.) until given permission (typically 2-3 weeks after injury)    Call the office if you notice any:  Increased numbness or tingling of your hand or fingers that is not relieved with elevation.  Increasing pain that is not controlled with medication.  Difficulty chewing, breathing, swallowing.  Chest pains or shortness of breath.  Fever over 101.4 degrees.    Bandage: Do NOT remove bandage until follow-up appointment.    Motion: Move fingers into a fist 5 times a day, DO NOT move any splinted fingers.    Weight bearing status: The operated extremity should be non-weight bearing until further notice.    Ice: Ice for 10 minutes every hour as needed for swelling x 24 hours.    Sling: Please use your sling while your arm is numb from the block. When your arm is FULLY awake again, you no longer need this and may use your sling as needed for comfort. While using the sling, make sure to move your shoulder throughout the day to prevent stiffness here.     Medications:   Naproxen 220 mg two times a day   Tylenol Extended Release 650 mg every 8 hours  Tramadol 1 tab every 6 hours AS needed for pain    After surgery, we would like you to take naproxen 220 mg one tablet by mouth every 12 hours with food  AND Tylenol 650 mg one tablet by mouth every 8 hours  (at breakfast, lunch and dinner) for 3-5 days after your surgery.  Please take these medication EVERYDAY after surgery for 3-5 days, and not just as needed. You can take these medications at the same time.  Taking these medications  after surgery will limit your need for prescription pain medication.      We will also prescribe a narcotic pain medication for a limited time after surgery that you can take as needed for moderate or severe pain.      Follow-up Appointment: 7-10 days.      Please call the office if you have any questions or concerns regarding your post-operative care.

## 2024-08-16 ENCOUNTER — APPOINTMENT (OUTPATIENT)
Dept: PHYSICAL THERAPY | Facility: CLINIC | Age: 74
End: 2024-08-16
Payer: MEDICARE

## 2024-08-20 ENCOUNTER — ANESTHESIA (OUTPATIENT)
Dept: PERIOP | Facility: HOSPITAL | Age: 74
End: 2024-08-20
Payer: MEDICARE

## 2024-08-20 PROCEDURE — 88304 TISSUE EXAM BY PATHOLOGIST: CPT | Performed by: PATHOLOGY

## 2024-08-20 PROCEDURE — 88333 PATH CONSLTJ SURG CYTO XM 1: CPT | Performed by: PATHOLOGY

## 2024-08-20 PROCEDURE — 88307 TISSUE EXAM BY PATHOLOGIST: CPT | Performed by: PATHOLOGY

## 2024-08-20 PROCEDURE — 88311 DECALCIFY TISSUE: CPT | Performed by: PATHOLOGY

## 2024-08-26 ENCOUNTER — APPOINTMENT (OUTPATIENT)
Dept: RADIOLOGY | Facility: CLINIC | Age: 74
End: 2024-08-26
Payer: MEDICARE

## 2024-08-26 ENCOUNTER — OFFICE VISIT (OUTPATIENT)
Dept: OBGYN CLINIC | Facility: CLINIC | Age: 74
End: 2024-08-26

## 2024-08-26 VITALS — BODY MASS INDEX: 29.35 KG/M2 | HEIGHT: 67 IN | WEIGHT: 187 LBS

## 2024-08-26 DIAGNOSIS — Z48.89 AFTERCARE FOLLOWING SURGERY: Primary | ICD-10-CM

## 2024-08-26 DIAGNOSIS — M25.531 ARTHRALGIA OF RIGHT WRIST: ICD-10-CM

## 2024-08-26 DIAGNOSIS — Z48.89 AFTERCARE FOLLOWING SURGERY: ICD-10-CM

## 2024-08-26 DIAGNOSIS — M19.131 SLAC (SCAPHOLUNATE ADVANCED COLLAPSE) OF WRIST, RIGHT: ICD-10-CM

## 2024-08-26 DIAGNOSIS — M18.11 PRIMARY OSTEOARTHRITIS OF FIRST CARPOMETACARPAL JOINT OF RIGHT HAND: ICD-10-CM

## 2024-08-26 PROCEDURE — 99024 POSTOP FOLLOW-UP VISIT: CPT | Performed by: ORTHOPAEDIC SURGERY

## 2024-08-26 PROCEDURE — 73110 X-RAY EXAM OF WRIST: CPT

## 2024-08-26 NOTE — PROGRESS NOTES
"Assessment:   11 days S/P Right wrist proximal row carpectomy - Right, Posterior interosseous nerve neurectomy - Right, EPL tendon transposition - Right, and Application of short arm splint - Right on 8/15/2024    Plan:   {kmtreatments:17447}    Follow Up:  {follow up time choices:00704}    To Do Next Visit:  {To do next visit:19702::\" \"}      CHIEF COMPLAINT:  Chief Complaint   Patient presents with    Right Wrist - Post-op, Suture / Staple Removal     Right wrist proximal row carpectomy- 8/15/24  Posterior interosseous nerve neurectomy   EPL tendon transposition            SUBJECTIVE:  Jose Garcia is a 74 y.o. male who presents for follow up after Right wrist proximal row carpectomy - Right, Posterior interosseous nerve neurectomy - Right, EPL tendon transposition - Right, and Application of short arm splint - Right on 8/15/2024.  Today patient has {Complaint:18615}.       PHYSICAL EXAMINATION:  Vital signs: There were no vitals taken for this visit.  General: {1234:06442::\"well developed and well nourished\",\"alert\",\"oriented times 3\",\"appears comfortable\"}  Psychiatric: {Psych:95693::\"Normal\"}    MUSCULOSKELETAL EXAMINATION:  Incision: {post op incision:07844}  Range of Motion: {post op rom:27077::\"As expected\"}  Neurovascular status: {post op neuro exam:33471::\"Neuro intact, good cap refill\"}  Activity Restrictions: {post op activity:62860::\"No restrictions\"}  {anything done today?:19523}      STUDIES REVIEWED:  {kmimagin::\"No Studies to review\"}      PROCEDURES PERFORMED:  Procedures  {Was Procdoc done:31712::\"No Procedures performed today\"}  "

## 2024-08-26 NOTE — PROGRESS NOTES
"Assessment:   S/P Right wrist proximal row carpectomy - Right, Posterior interosseous nerve neurectomy - Right, EPL tendon transposition - Right, and Application of short arm splint - Right on 8/15/2024    Plan:   Volar wrist brace provided. Patient should wear brace at all times other than showering and when working on gentle wrist ROM to 45 degrees in each direction and making a composite fist. Volar wrist brace until his next appointment.  Patient should work on gentle wrist ROM to 45 degrees each direction and making a composite fist.. He must have a composite fist by his next appointment in 6 weeks. I explained his ability to make a composite wrist in the next 6 weeks is very important for his outcomes.  Half gallon milk lifting restriction. Cleared for lifting, pulling, pushing up to 5 lb in volar wrist brace.  He is not cleared to drive at this time.     Follow Up:  6  week(s)    To Do Next Visit:  Recheck right wrist      CHIEF COMPLAINT:  Chief Complaint   Patient presents with    Right Wrist - Post-op, Suture / Staple Removal     Right wrist proximal row carpectomy- 8/15/24  Posterior interosseous nerve neurectomy   EPL tendon transposition            SUBJECTIVE:  Jose Garcia is a 74 y.o. male who presents for follow up 11 days s/p Right wrist proximal row carpectomy - Right, Posterior interosseous nerve neurectomy - Right, EPL tendon transposition - Right, and Application of short arm splint - Right, DOS 8/15/2024.  Patient reports his pain was well controlled with the nerve block. The patient reports he did not take Tramadol. The patient reports he is taking Naproxen and Tylenol for pain. He reports he regained ability to wiggle the fingers on 08/23/2024. He denies any fevers or chills.       PHYSICAL EXAMINATION:  Vital signs: Ht 5' 7\" (1.702 m)   Wt 84.8 kg (187 lb)   BMI 29.29 kg/m²   General: well developed and well nourished, alert, oriented times 3, and appears comfortable  Psychiatric: " Normal    MUSCULOSKELETAL EXAMINATION:  Incision: No erythema. No redness.  Range of Motion: 80% composite fist  Neurovascular status: Neuro intact, good cap refill, Neuro intact, cap refill intact, and radial pulse 2+  Activity Restrictions:  lifting, pulling, pushing up to 5 lb in volar wrist splint. Volar wrist splint at all times other than showering and for wrist ROM.  Done today: Sutures out, Steri strips applied, and volar wrist brace provided.      STUDIES REVIEWED:  Images were reviewed in PACS by Dr. Klein and demonstrate:   08/26/2024: Xrays right wrist demonstrate status post right wrist carpectomy with no acute fractures or dislocation.      PROCEDURES PERFORMED:  Procedures  No Procedures performed today    Scribe Attestation      I,:  Heidy Lemus am acting as a scribe while in the presence of the attending physician.:       I,:  Jerzy Klein MD personally performed the services described in this documentation    as scribed in my presence.:

## 2024-09-03 ENCOUNTER — TELEPHONE (OUTPATIENT)
Age: 74
End: 2024-09-03

## 2024-09-03 DIAGNOSIS — L08.9 SKIN INFECTION: Primary | ICD-10-CM

## 2024-09-03 RX ORDER — CEFADROXIL 500 MG/1
500 CAPSULE ORAL EVERY 12 HOURS SCHEDULED
Qty: 20 CAPSULE | Refills: 0 | Status: SHIPPED | OUTPATIENT
Start: 2024-09-03 | End: 2024-09-13

## 2024-09-03 NOTE — TELEPHONE ENCOUNTER
I reached out to the patient and discussed the picture. Duricef was sent to the pharmacy for him to  and start taking. Can we please reach out to the patient to have him schedule an appointment for Friday with myself in the morning for evaluation of the wrist? Thank you.

## 2024-09-03 NOTE — TELEPHONE ENCOUNTER
Caller: patient     Doctor: Ernie     Reason for call: bandages lifting, notices yellow discharge and his wrist is red, patient is worried about infection.      Call back#: [phone]    Advised to send pic to [email or web address]   and he will get a cb with further instruction.   Dr Klein and Gagan not available for scheduling at time of call

## 2024-09-03 NOTE — TELEPHONE ENCOUNTER
I received this picture via my email ([email or web address]) with the following message, please review and advise, Thanks!    Here is the picture of the incision made during my proximal row carpectomy on August 15.  The stitches were removed on August 26th.  The area was getting extremely itchy yesterday and appeared to “oozing” a bit through the sterile-strips so I removed the ones that were still in place to look at the area and found it to be as shown below.  The area appeared inflamed and I was not sure if the yellow looking area indicated an infection. I placed some antibacterial cream on it and covered it with a large bandage.      I just wanted to determine if this appearance is normal or if it might indicate an infection and what is the best treatment going foreward.

## 2024-09-06 ENCOUNTER — OFFICE VISIT (OUTPATIENT)
Dept: OBGYN CLINIC | Facility: HOSPITAL | Age: 74
End: 2024-09-06

## 2024-09-06 DIAGNOSIS — M19.131 SLAC (SCAPHOLUNATE ADVANCED COLLAPSE) OF WRIST, RIGHT: Primary | ICD-10-CM

## 2024-09-06 PROCEDURE — 99024 POSTOP FOLLOW-UP VISIT: CPT | Performed by: PHYSICIAN ASSISTANT

## 2024-09-06 NOTE — PROGRESS NOTES
Assessment:   S/P Right wrist proximal row carpectomy - Right, Posterior interosseous nerve neurectomy - Right, EPL tendon transposition - Right, and Application of short arm splint - Right on 8/15/2024    Plan:   It was discussed with the patient to continue the antibiotics as prescribed  He was encouraged to work on his range of motion as demonstrated in the office  He was advised to continue to the use of the brace  He will follow-up as scheduled for reevaluation    Follow Up:  As scheduled    To Do Next Visit:  Reevaluation      CHIEF COMPLAINT:  Chief Complaint   Patient presents with    Right Wrist - Post-op, Wound Check     proximal row carpectomy- 8/15/24         SUBJECTIVE:  Jose Garica is a 74 y.o. male who presents for follow up after Right wrist proximal row carpectomy - Right, Posterior interosseous nerve neurectomy - Right, EPL tendon transposition - Right, and Application of short arm splint - Right on 8/15/2024.  Today patient has improvement in the redness over the incision.  He states that there is no active drainage.  He has been taking antibiotics as prescribed.      PHYSICAL EXAMINATION:  Vital signs: There were no vitals taken for this visit.  General: well developed and well nourished, alert, oriented times 3, and appears comfortable  Psychiatric: Normal    MUSCULOSKELETAL EXAMINATION:  Incision: clean, dry, and mild erythema noted over the proximal incision, small scab is appreciated, no active drainage appreciated  Range of Motion: As expected and Limited due to stiffness  Neurovascular status: Neuro intact, good cap refill  Activity Restrictions: Restrictions: Continue the use of the splint and work on range of motion on his own with wrist flexion and extension         STUDIES REVIEWED:  No Studies to review      PROCEDURES PERFORMED:  Procedures  No Procedures performed today

## 2024-09-20 ENCOUNTER — TELEPHONE (OUTPATIENT)
Dept: OBGYN CLINIC | Facility: MEDICAL CENTER | Age: 74
End: 2024-09-20

## 2024-09-20 NOTE — TELEPHONE ENCOUNTER
Received transfer call from pt. He states that he finished his 10 days of antibiotics and everything was going well and then he took a shower and the scab came off and he noticed what appears to be a pus pocket under it and it is painful. He will emailed a picture to me at [email or web address]. He requested an appt today but no appts available even at [facility]. Please review and advise

## 2024-09-20 NOTE — TELEPHONE ENCOUNTER
Caller: Salas     Doctor: Dr Klein     Reason for call: He is calling back today due to part of the scab falling off. He is asking to come into look at his incision. Call transferred to Pod Nurse.     Call back#: [phone]

## 2024-09-23 ENCOUNTER — OFFICE VISIT (OUTPATIENT)
Dept: OBGYN CLINIC | Facility: CLINIC | Age: 74
End: 2024-09-23

## 2024-09-23 VITALS — HEIGHT: 67 IN | WEIGHT: 187 LBS | BODY MASS INDEX: 29.35 KG/M2

## 2024-09-23 DIAGNOSIS — L08.9 SKIN INFECTION: Primary | ICD-10-CM

## 2024-09-23 PROCEDURE — 99024 POSTOP FOLLOW-UP VISIT: CPT | Performed by: PHYSICIAN ASSISTANT

## 2024-09-23 NOTE — PROGRESS NOTES
"Assessment:   S/P Right wrist proximal row carpectomy - Right, Posterior interosseous nerve neurectomy - Right, EPL tendon transposition - Right, and Application of short arm splint - Right on 8/15/2024  Wound over the dorsal aspect of the wrist    Plan:   Wound was inspected and small vicryl suture was removed from proximal aspect of the wound in office today  Patient was instructed on Olga protocol in office today with instructions to complete protocol 2-3x/day  Instructed patient to keep the wound covered with gauze underneath the wrist brace with continued use of the brace  He will follow-up as scheduled for re-evaluation    Follow Up:  Already scheduled    To Do Next Visit:  Re-evaluation      CHIEF COMPLAINT:  Chief Complaint   Patient presents with    Right Wrist - Post-op, Wound Check     proximal row carpectomy- 8/15/24         SUBJECTIVE:  Jose Garcia is a 74 y.o. male who presents for follow up after Right wrist proximal row carpectomy - Right, Posterior interosseous nerve neurectomy - Right, EPL tendon transposition - Right, and Application of short arm splint - Right on 8/15/2024.  Today patient has improvement in the redness over the incision however he finished the antibiotics and had a scab that fell off when showering. He was concerned that there was a small area of pus under the scab. He states that there is no active drainage at this point however he did have the pus area that popped yesterday and did have drainage.        PHYSICAL EXAMINATION:  Vital signs: Ht 5' 7\" (1.702 m)   Wt 84.8 kg (187 lb)   BMI 29.29 kg/m²   General: well developed and well nourished, alert, oriented times 3, and appears comfortable  Psychiatric: Normal    MUSCULOSKELETAL EXAMINATION:  Incision: clean, dry, and mild erythema noted over the proximal incision, small scab is appreciated, no active drainage appreciated  Range of Motion: As expected and Limited due to stiffness  Neurovascular status: Neuro intact, " good cap refill  Activity Restrictions: Restrictions: Continue the use of the splint and work on range of motion on his own with wrist flexion and extension   There was a small vicryl suture that was removed in the office today.      STUDIES REVIEWED:  No Studies to review      PROCEDURES PERFORMED:  Procedures  No Procedures performed today

## 2024-10-07 ENCOUNTER — OFFICE VISIT (OUTPATIENT)
Dept: OBGYN CLINIC | Facility: CLINIC | Age: 74
End: 2024-10-07

## 2024-10-07 VITALS — HEIGHT: 67 IN | BODY MASS INDEX: 29.35 KG/M2 | WEIGHT: 187 LBS

## 2024-10-07 DIAGNOSIS — M19.131 SLAC (SCAPHOLUNATE ADVANCED COLLAPSE) OF WRIST, RIGHT: Primary | ICD-10-CM

## 2024-10-07 PROCEDURE — 99024 POSTOP FOLLOW-UP VISIT: CPT | Performed by: ORTHOPAEDIC SURGERY

## 2024-10-07 NOTE — PROGRESS NOTES
"Assessment:   S/P Right wrist proximal row carpectomy - Right, Posterior interosseous nerve neurectomy - Right, EPL tendon transposition - Right, and Application of short arm splint - Right on 8/15/2024  Wound over the dorsal aspect of the wrist -well-healed    Plan:   Discussed with the patient to continue to work on range of motion on his own  A therapy prescription was provided  He states that he is traveling down to [place] for a few weeks starting next Thursday of which she can use the therapy prescription to work on his range of motion  He can use his hand as tolerated for his activities of daily living  He will follow-up once coming back from [place] for reevaluation of the wrist, at that time we will get an x-ray of the right wrist and discuss right thumb CMC interpositional arthroplasty    Follow Up:  8 weeks    To Do Next Visit:  Re-evaluation, x-ray right wrist with CMC view, discuss right thumb CMC interpositional arthroplasty      CHIEF COMPLAINT:  Chief Complaint   Patient presents with    Right Wrist - Post-op     Right wrist proximal row carpectomy- 8/15/24  Posterior interosseous nerve neurectomy   EPL tendon transposition            SUBJECTIVE:  Jose Garcia is a 74 y.o. male who presents for follow up after Right wrist proximal row carpectomy - Right, Posterior interosseous nerve neurectomy - Right, EPL tendon transposition - Right, and Application of short arm splint - Right on 8/15/2024.  Today patient has improvement in the redness over the incision.  He states he has been applying a small amount of antibiotic ointment over the top and taking care of the wound as discussed in our previous office visit.  He notes minimal pain and discomfort.    PHYSICAL EXAMINATION:  Vital signs: Ht 5' 7\" (1.702 m)   Wt 84.8 kg (187 lb)   BMI 29.29 kg/m²   General: well developed and well nourished, alert, oriented times 3, and appears comfortable  Psychiatric: Normal    MUSCULOSKELETAL " EXAMINATION:  Incision: Clean, dry and healed, minimal erythema noted over the incision, no open wounds, no signs of infection  Range of Motion: As expected and Limited due to stiffness  Neurovascular status: Neuro intact, good cap refill  Activity Restrictions: No formal restrictions at this time, can use the brace for comfort while lifting heavier objects        STUDIES REVIEWED:  No Studies to review      PROCEDURES PERFORMED:  Procedures  No Procedures performed today

## 2024-12-13 ENCOUNTER — RA CDI HCC (OUTPATIENT)
Dept: OTHER | Facility: HOSPITAL | Age: 74
End: 2024-12-13

## 2024-12-16 ENCOUNTER — APPOINTMENT (OUTPATIENT)
Dept: RADIOLOGY | Facility: CLINIC | Age: 74
End: 2024-12-16
Payer: MEDICARE

## 2024-12-16 ENCOUNTER — OFFICE VISIT (OUTPATIENT)
Dept: OBGYN CLINIC | Facility: CLINIC | Age: 74
End: 2024-12-16
Payer: MEDICARE

## 2024-12-16 ENCOUNTER — APPOINTMENT (OUTPATIENT)
Dept: LAB | Facility: HOSPITAL | Age: 74
End: 2024-12-16
Payer: MEDICARE

## 2024-12-16 VITALS — WEIGHT: 193 LBS | HEIGHT: 67 IN | BODY MASS INDEX: 30.29 KG/M2

## 2024-12-16 DIAGNOSIS — R73.01 IFG (IMPAIRED FASTING GLUCOSE): ICD-10-CM

## 2024-12-16 DIAGNOSIS — Z48.89 AFTERCARE FOLLOWING SURGERY: ICD-10-CM

## 2024-12-16 DIAGNOSIS — R52 PAIN: ICD-10-CM

## 2024-12-16 DIAGNOSIS — I10 ESSENTIAL HYPERTENSION: ICD-10-CM

## 2024-12-16 DIAGNOSIS — Z48.89 AFTERCARE FOLLOWING SURGERY: Primary | ICD-10-CM

## 2024-12-16 LAB
ALBUMIN SERPL BCG-MCNC: 4 G/DL (ref 3.5–5)
ALP SERPL-CCNC: 81 U/L (ref 34–104)
ALT SERPL W P-5'-P-CCNC: 12 U/L (ref 7–52)
ANION GAP SERPL CALCULATED.3IONS-SCNC: 6 MMOL/L (ref 4–13)
AST SERPL W P-5'-P-CCNC: 14 U/L (ref 13–39)
BILIRUB SERPL-MCNC: 0.39 MG/DL (ref 0.2–1)
BUN SERPL-MCNC: 21 MG/DL (ref 5–25)
CALCIUM SERPL-MCNC: 9.2 MG/DL (ref 8.4–10.2)
CHLORIDE SERPL-SCNC: 108 MMOL/L (ref 96–108)
CO2 SERPL-SCNC: 24 MMOL/L (ref 21–32)
CREAT SERPL-MCNC: 0.92 MG/DL (ref 0.6–1.3)
CREAT UR-MCNC: 56.1 MG/DL
EST. AVERAGE GLUCOSE BLD GHB EST-MCNC: 146 MG/DL
GFR SERPL CREATININE-BSD FRML MDRD: 80 ML/MIN/1.73SQ M
GLUCOSE P FAST SERPL-MCNC: 121 MG/DL (ref 65–99)
HBA1C MFR BLD: 6.7 %
MICROALBUMIN UR-MCNC: 7.9 MG/L
MICROALBUMIN/CREAT 24H UR: 14 MG/G CREATININE (ref 0–30)
POTASSIUM SERPL-SCNC: 4.5 MMOL/L (ref 3.5–5.3)
PROT SERPL-MCNC: 7.3 G/DL (ref 6.4–8.4)
SODIUM SERPL-SCNC: 138 MMOL/L (ref 135–147)
TSH SERPL DL<=0.05 MIU/L-ACNC: 1.72 UIU/ML (ref 0.45–4.5)

## 2024-12-16 PROCEDURE — 84443 ASSAY THYROID STIM HORMONE: CPT

## 2024-12-16 PROCEDURE — 99213 OFFICE O/P EST LOW 20 MIN: CPT | Performed by: ORTHOPAEDIC SURGERY

## 2024-12-16 PROCEDURE — 83036 HEMOGLOBIN GLYCOSYLATED A1C: CPT

## 2024-12-16 PROCEDURE — 73110 X-RAY EXAM OF WRIST: CPT

## 2024-12-16 PROCEDURE — 82570 ASSAY OF URINE CREATININE: CPT

## 2024-12-16 PROCEDURE — 82043 UR ALBUMIN QUANTITATIVE: CPT

## 2024-12-16 PROCEDURE — 80053 COMPREHEN METABOLIC PANEL: CPT

## 2024-12-16 PROCEDURE — 36415 COLL VENOUS BLD VENIPUNCTURE: CPT

## 2024-12-16 NOTE — PROGRESS NOTES
ASSESSMENT/PLAN:    Assessment:   74-year-old RHD male 4 months s/p right wrist proximal row carpectomy performed 8/15/2024    Plan:   Demonstrates promising range of motion and strength on exam today  Continue return to ADLs as desired using pain as his guide  No restrictions imposed at this time  No need for scheduled follow-up, should any questions or concerns arise in the future he can contact the office  Patient expresses understanding and is in agreement with this treatment plan    To Do Next Visit:  Reevaluation of current issue    General Discussions:       Operative Discussions:         _____________________________________________________  CHIEF COMPLAINT:  Chief Complaint   Patient presents with   • Right Wrist - Post-op     Right wrist proximal row carpectomy- 8/15/24  Posterior interosseous nerve neurectomy   EPL tendon transposition            SUBJECTIVE:  Jose Garcia is a 74 y.o. male who presents for follow-up evaluation 4 months s/p right wrist proximal row carpectomy performed 8/15/2024. Patient states that he has been consistent with home exercise program focused on active and active assistive wrist flexion and extension stretching. He has been able to return to light woodworking to satisfaction without significant symptom exacerbation. He reports mild pain at the base of the thumb CMC, but states that it is significantly improved postoperatively. He denies any new onset bruising, swelling, numbness, tingling, or feelings of instability. He also denies any recent fever, chills, headache, nausea, dizziness, or malaise. He does note mild pain in the left wrist similar to that which he experienced prior to surgery on the right.    PAST MEDICAL HISTORY:  Past Medical History:   Diagnosis Date   • Allergic 2015    spring/fall   • Arthritis 2005   • Disease of thyroid gland    • Hypertension    • Kidney stone 2010    one time   • Sleep disorder        PAST SURGICAL HISTORY:  Past Surgical History:    Procedure Laterality Date   • CHOLECYSTECTOMY     • ELBOW SURGERY Left     bursa removed   • EYE SURGERY  cataract removal/lens implant    2018   • JOINT REPLACEMENT     • KNEE SURGERY     • LEG WOUND REPAIR / CLOSURE     • PA APPLICATION SHORT ARM SPLINT FOREARM-HAND STATIC Right 8/15/2024    Procedure: Application of short arm splint;  Surgeon: Jerzy Klein MD;  Location:  MAIN OR;  Service: Orthopedics   • PA CARPECTOMY ALL BONES PROXIMAL ROW Right 8/15/2024    Procedure: Right wrist proximal row carpectomy;  Surgeon: Jerzy Klein MD;  Location:  MAIN OR;  Service: Orthopedics   • PA TDN TRNSPLJ/TR FLXR/XTNSR F/ARM&/WRST 1 EA TDN Right 8/15/2024    Procedure: EPL tendon transposition;  Surgeon: Jerzy Klein MD;  Location:  MAIN OR;  Service: Orthopedics   • PA TRANSECTION/AVULSION OTH SPINAL NRV XDRL Right 8/15/2024    Procedure: Posterior interosseous nerve neurectomy;  Surgeon: Jerzy Klein MD;  Location:  MAIN OR;  Service: Orthopedics       FAMILY HISTORY:  Family History   Problem Relation Age of Onset   • Breast cancer Mother    • Cancer Mother    • Heart disease Father    • Gout Father    • Autoimmune disease Sister    • Breast cancer Sister    • Alcohol abuse Brother    • Depression Brother    • Stroke Brother    • Heart disease Brother    • Heart disease Brother    • Arthritis Brother    • Heart disease Brother    • Heart disease Brother    • Thyroid disease Daughter    • Asthma Daughter    • Thyroid disease Daughter    • Mental illness Neg Hx        SOCIAL HISTORY:  Social History     Tobacco Use   • Smoking status: Former     Current packs/day: 0.00     Types: Cigarettes, Pipe, Cigars     Quit date:      Years since quittin.9   • Smokeless tobacco: Never   • Tobacco comments:     quit in    Vaping Use   • Vaping status: Never Used   Substance Use Topics   • Alcohol use: Not Currently     Alcohol/week: 8.0 standard drinks of alcohol     Types: 7 Glasses of  wine, 1 Shots of liquor per week   • Drug use: No       MEDICATIONS:    Current Outpatient Medications:   •  acetaminophen (TYLENOL) 650 mg CR tablet, Take 1 tablet (650 mg total) by mouth every 8 (eight) hours as needed for mild pain, Disp: 30 tablet, Rfl: 0  •  Ascorbic Acid (vitamin C) 1000 MG tablet, Take 1,000 mg by mouth daily, Disp: , Rfl:   •  Cholecalciferol (Vitamin D3) 1.25 MG (04830 UT) CAPS, Take 1.25 mg by mouth in the morning Vitamin D3 1.25 MG (63337BW) CAPS, Disp: , Rfl:   •  ELDERBERRY PO, Take 1 tablet by mouth daily, Disp: , Rfl:   •  famotidine (PEPCID) 20 mg tablet, Take 1 tablet (20 mg total) by mouth 2 (two) times a day, Disp: 180 tablet, Rfl: 3  •  fluorouracil (EFUDEX) 5 % cream, APPLY CREAM TO THE SCALP TWICE DAILY FOR 2 WEEKS. KEEP OUT OF REACH OF CHILDREN/PETS. WILL CAUSE REDNESS AND CRUSTING., Disp: , Rfl:   •  ibuprofen (MOTRIN) 200 mg tablet, Take 400 mg by mouth every 6 (six) hours as needed for mild pain, Disp: , Rfl:   •  levothyroxine (Euthyrox) 150 mcg tablet, Take 1 tablet (150 mcg total) by mouth daily in the early morning, Disp: 90 tablet, Rfl: 3  •  losartan (COZAAR) 100 MG tablet, Take 1 tablet (100 mg total) by mouth daily, Disp: 90 tablet, Rfl: 3  •  simvastatin (ZOCOR) 20 mg tablet, Take 1 tablet (20 mg total) by mouth daily at bedtime, Disp: 90 tablet, Rfl: 3  •  vitamin B-12 (VITAMIN B-12) 500 mcg tablet, Take 2 tablets (1,000 mcg total) by mouth daily, Disp: 180 tablet, Rfl: 1  •  Naproxen Sodium 220 MG CAPS, Take 1 capsule (220 mg total) by mouth 2 (two) times a day (Patient not taking: Reported on 12/16/2024), Disp: 60 capsule, Rfl: 0  •  traMADol (Ultram) 50 mg tablet, Take 1 tablet (50 mg total) by mouth every 6 (six) hours as needed for moderate pain, Disp: 10 tablet, Rfl: 0  •  traZODone (DESYREL) 150 mg tablet, Take 1 tablet (150 mg total) by mouth daily at bedtime (Patient not taking: Reported on 12/16/2024), Disp: 45 tablet, Rfl: 3    ALLERGIES:  No Known  "Allergies    REVIEW OF SYSTEMS:  Pertinent items are noted in HPI.  A comprehensive review of systems was negative.    LABS:  HgA1c:   Lab Results   Component Value Date    HGBA1C 6.6 (H) 06/12/2024     BMP:   Lab Results   Component Value Date    CALCIUM 9.4 06/12/2024    K 4.8 06/12/2024    CO2 26 06/12/2024     06/12/2024    BUN 22 06/12/2024    CREATININE 0.95 06/12/2024       _____________________________________________________  PHYSICAL EXAMINATION:  Vital signs: Ht 5' 7\" (1.702 m)   Wt 87.5 kg (193 lb)   BMI 30.23 kg/m²   General: well developed and well nourished, alert, oriented times 3, and appears comfortable  Psychiatric: Normal  HEENT: Trachea Midline, No torticollis  Cardiovascular: No discernable arrhythmia  Pulmonary: No wheezing or stridor  Abdomen: No rebound or guarding  Extremities: No peripheral edema  Skin: No masses, erythema, lacerations, fluctation, ulcerations  Neurovascular: Sensation Intact to the Median, Ulnar, Radial Nerve, Motor Intact to the Median, Ulnar, Radial Nerve, and Pulses Intact    MUSCULOSKELETAL EXAMINATION:  Right wrist -   Incision(s): Well-healed  Skin is warm and dry to touch with no signs of erythema, ecchymosis, infection  No soft tissue swelling  No effusion  ROM: Extension 0-40°, flexion 0-45°  Strength: 5 -/5 close fist   Finger flexion to distal palmar crease  2+ distal radial pulse with brisk capillary refill to the fingers  Radial, median, and ulnar motor and sensory distributions intact  Sensation light touch intact distally        _____________________________________________________  STUDIES REVIEWED:  Attending Physician has personally reviewed pertinent imaging in PACS, impression is as follows:    Review of radiographic series taken 12/16/2024 of the right wrist shows advanced STT arthritis, distal carpal row to be in maintained alignment s/p proximal row carpectomy    Review of radiographic series taken 12/16/2024 of the left wrist shows 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

## 2024-12-20 ENCOUNTER — OFFICE VISIT (OUTPATIENT)
Dept: FAMILY MEDICINE CLINIC | Facility: HOSPITAL | Age: 74
End: 2024-12-20
Payer: MEDICARE

## 2024-12-20 VITALS
TEMPERATURE: 97.4 F | WEIGHT: 191 LBS | SYSTOLIC BLOOD PRESSURE: 132 MMHG | DIASTOLIC BLOOD PRESSURE: 78 MMHG | BODY MASS INDEX: 29.98 KG/M2 | OXYGEN SATURATION: 98 % | HEIGHT: 67 IN | HEART RATE: 68 BPM | RESPIRATION RATE: 16 BRPM

## 2024-12-20 DIAGNOSIS — J06.9 UPPER RESPIRATORY TRACT INFECTION, UNSPECIFIED TYPE: Primary | ICD-10-CM

## 2024-12-20 DIAGNOSIS — R09.81 NASAL CONGESTION: ICD-10-CM

## 2024-12-20 DIAGNOSIS — I10 ESSENTIAL HYPERTENSION: ICD-10-CM

## 2024-12-20 DIAGNOSIS — E03.9 ACQUIRED HYPOTHYROIDISM: ICD-10-CM

## 2024-12-20 DIAGNOSIS — E11.9 TYPE 2 DIABETES MELLITUS WITHOUT COMPLICATION, WITHOUT LONG-TERM CURRENT USE OF INSULIN (HCC): ICD-10-CM

## 2024-12-20 PROBLEM — R73.01 IFG (IMPAIRED FASTING GLUCOSE): Status: RESOLVED | Noted: 2018-09-18 | Resolved: 2024-12-20

## 2024-12-20 LAB
SARS-COV-2 AG UPPER RESP QL IA: NEGATIVE
VALID CONTROL: NORMAL

## 2024-12-20 PROCEDURE — 87811 SARS-COV-2 COVID19 W/OPTIC: CPT | Performed by: INTERNAL MEDICINE

## 2024-12-20 PROCEDURE — G2211 COMPLEX E/M VISIT ADD ON: HCPCS | Performed by: INTERNAL MEDICINE

## 2024-12-20 PROCEDURE — 99214 OFFICE O/P EST MOD 30 MIN: CPT | Performed by: INTERNAL MEDICINE

## 2024-12-20 RX ORDER — CHOLECALCIFEROL (VITAMIN D3) 25 MCG
CAPSULE ORAL
COMMUNITY

## 2024-12-20 NOTE — ASSESSMENT & PLAN NOTE
Patient has hypothyroidism.  TSH was normal this month.  Continue same dose of levothyroxine  Orders:  •  TSH, 3rd generation with Free T4 reflex; Future

## 2024-12-20 NOTE — ASSESSMENT & PLAN NOTE
Lab Results   Component Value Date    HGBA1C 6.7 (H) 12/16/2024     We reviewed labs from December 16.  Patient developed diabetes with A1c of 6.7.  He is less physically active because he takes care of his wife who has seizures    We discussed diet and trying to increase physical activity to lower blood sugars.  He wishes to try diet and exercise before starting medication for diabetes.    He is going to travel to [place] and will come back the first part of May.  Will recheck blood work after his return and we will see him back in June.    Patient had no microalbuminuria and examination of the feet was normal today    Orders:  •  Albumin / creatinine urine ratio; Future  •  Hemoglobin A1C; Future  •  Lipid Panel with Direct LDL reflex; Future

## 2024-12-20 NOTE — ASSESSMENT & PLAN NOTE
Patient has hypertension.  Blood pressure was acceptable today.  Electrolytes and renal function were normal this month.  Continue losartan 100 mg daily.  Will recheck electrolytes and renal function in May    Orders:  •  Comprehensive metabolic panel; Future  •  TSH, 3rd generation with Free T4 reflex; Future  •  CBC and Platelet; Future

## 2024-12-20 NOTE — PROGRESS NOTES
Name: Jose Garcia      : 1950      MRN: [id number]  Encounter Provider: Michel Jarrell MD  Encounter Date: 2024   Encounter department: [facility] PRIMARY C[facility] [address]  :  Assessment & Plan  Upper respiratory tract infection, unspecified type  Patient developed runny nose and nasal congestion this morning.  Denies fevers, cough, shortness of breath, chest pain, pleurisy.    He had clear discharge and scant nasal congestion both nostril.  He had no signs of strep throat.  Lungs were completely clear to auscultation without crackles or wheezing.    His COVID test came back negative in the office    He most likely has the start of a viral upper respiratory infection.  I found no evidence of bacterial rhinosinusitis or pneumonia.    He has Flonase nasal spray at home.  I asked him to use 2 sprays each nostril daily, continue with his vitamin D and vitamin C  Orders:  •  POCT Rapid Covid Ag    Nasal congestion    Orders:  •  POCT Rapid Covid Ag    Essential hypertension  Patient has hypertension.  Blood pressure was acceptable today.  Electrolytes and renal function were normal this month.  Continue losartan 100 mg daily.  Will recheck electrolytes and renal function in May    Orders:  •  Comprehensive metabolic panel; Future  •  TSH, 3rd generation with Free T4 reflex; Future  •  CBC and Platelet; Future    Acquired hypothyroidism  Patient has hypothyroidism.  TSH was normal this month.  Continue same dose of levothyroxine  Orders:  •  TSH, 3rd generation with Free T4 reflex; Future    Type 2 diabetes mellitus without complication, without long-term current use of insulin ([facility])    Lab Results   Component Value Date    HGBA1C 6.7 (H) 2024     We reviewed labs from .  Patient developed diabetes with A1c of 6.7.  He is less physically active because he takes care of his wife who has seizures    We discussed diet and trying to increase physical activity to lower blood sugars.   "He wishes to try diet and exercise before starting medication for diabetes.    He is going to travel to [place] and will come back the first part of May.  Will recheck blood work after his return and we will see him back in June.    Patient had no microalbuminuria and examination of the feet was normal today    Orders:  •  Albumin / creatinine urine ratio; Future  •  Hemoglobin A1C; Future  •  Lipid Panel with Direct LDL reflex; Future           History of Present Illness     HPI  Review of Systems    Objective   /78 (BP Location: Left arm, Patient Position: Sitting)   Pulse 68   Temp (!) 97.4 °F (36.3 °C) (Tympanic)   Resp 16   Ht 5' 7\" (1.702 m)   Wt 86.6 kg (191 lb)   SpO2 98%   BMI 29.91 kg/m²      Physical Exam  Constitutional:       General: He is not in acute distress.     Appearance: He is well-developed. He is not toxic-appearing.   HENT:      Head: Normocephalic.      Nose: Congestion and rhinorrhea present.      Mouth/Throat:      Mouth: Mucous membranes are moist.      Pharynx: No oropharyngeal exudate.   Eyes:      Conjunctiva/sclera: Conjunctivae normal.   Cardiovascular:      Rate and Rhythm: Normal rate and regular rhythm.      Pulses: no weak pulses.           Dorsalis pedis pulses are 2+ on the right side and 2+ on the left side.        Posterior tibial pulses are 2+ on the right side and 2+ on the left side.      Heart sounds: No murmur heard.  Pulmonary:      Effort: No respiratory distress.      Breath sounds: No wheezing or rales.   Abdominal:      General: Bowel sounds are normal.      Palpations: Abdomen is soft.      Tenderness: There is no abdominal tenderness.   Musculoskeletal:      Cervical back: Neck supple.   Feet:      Right foot:      Skin integrity: No ulcer.      Left foot:      Skin integrity: No ulcer.   Skin:     General: Skin is warm and dry.   Neurological:      Mental Status: He is alert and oriented to person, place, and time.      Cranial Nerves: No cranial " nerve deficit.      Motor: No weakness.   Psychiatric:         Mood and Affect: Mood normal.     Diabetic Foot Exam    Patient's shoes and socks removed.    Right Foot/Ankle   Right Foot Inspection  Skin Exam: No ulcer.     Toe Exam: Erythema    Sensory   Monofilament testing: intact    Vascular  The right DP pulse is 2+. The right PT pulse is 2+.     Left Foot/Ankle  Left Foot Inspection  Skin Exam: No ulcer.     Toe Exam: No erythema.     Sensory   Monofilament testing: intact    Vascular  The left DP pulse is 2+. The left PT pulse is 2+.     Assign Risk Category  No deformity present  No loss of protective sensation  No weak pulses  Risk: 0

## 2024-12-20 NOTE — PATIENT INSTRUCTIONS
"Patient Education     Diabetes y dieta   Conceptos Básicos   Redactado por los médicos y editores de [employer]   ¿Por qué la dieta es importante si tengo diabetes? -- La dieta es importante porque es parte del tratamiento de la diabetes. Para ayudar a tratar la diabetes, muchas personas tienen que modificar lo que comen y la cantidad que comen. Es importante que las personas traten la diabetes para que puedan:   Mantener timmons azúcar en jeronimo en un nivel normal o cerca de ryan   Prevenir problemas a elicia plazo, laila por ejemplo problemas renales, que pueden aparecer en las personas con diabetes  La modificación de la dieta también puede ayudar a tratar la obesidad, la presión arterial edinson y el colesterol alto. Estos padecimientos pueden afectar a las personas con diabetes y provocar problemas en el futuro, laila infartos o accidentes cerebrovasculares (derrames).  ¿Quién me ayudará a modificar mi dieta? -- Timmons médico o enfermero trabajará con usted para crear un plan de alimentación y modificar timmons dieta. También es posible que le recomiende que trabaje con un \"nutricionista\". Un nutricionista es un experto en alimentos y alimentación.  ¿Tiffanie comer a las mismas horas todos los días? -- El momento y la frecuencia para alimentarse dependen, en parte, de las medicinas para la diabetes que use. Por ejemplo:   Las personas que usan aproximadamente la misma cantidad de insulina a la misma hora todos los días (lo que se llama \"régimen fijo\") deben comer siempre a la misma hora. [place] también se aplica para las personas que marilyn píldoras que aumentan los niveles de insulina, laila las sulfonilureas. Seltzer a la misma hora todos los días ayuda a prevenir niveles bajos de azúcar en jeronimo.   Las personas que ajustan diariamente timmons dosis de insulina y a qué hora la usan (lo que se llama \"régimen flexible\") no siempre tienen que comer a la misma hora. Eso se debe a que pueden programar timmons dosis de insulina para antes de cuando tengan " "planeado comer, y también ajustar la dosis en función de cuánto planean comer.   Las personas que usan medicinas que no suelen causar niveles bajos de azúcar en jeronimo, laila la metformina, no necesitan comer a la misma hora todos los días  ¿Qué scot tener en cuenta al planificar lo que comeré? -- El organismo convierte los alimentos que consumimos en partes pequeñas llamadas carbohidratos, proteínas y grasas.  Al planificar lo que comerán, las personas con diabetes deben tener en cuenta lo siguiente:   Carbohidratos - Los carbohidratos son azúcares que el organismo utiliza para generar energía. Pueden aumentar el nivel de azúcar en jeronimo. Carter médico, enfermero o nutricionista le indicará cuántos carbohidratos debe consumir en cada comida o refrigerio. Entre los alimentos que contienen carbohidratos se incluyen:   Pan, pasta y arroz   Verduras y frutas   Lácteos   Alimentos y bebidas con azúcar agregada  Es mejor obtener los carbohidratos de las frutas, las verduras, los granos enteros y la leche descremada. Lo mejor es evitar las bebidas con azúcar agregada, laila las gaseosas, los jugos y las bebidas deportivas.   Proteína - El médico, enfermero o nutricionista le dirá cuánta proteína debe consumir a diario. Lo más conveniente es consumir pablo magras, pescado, huevos, frijoles, arvejas, productos derivados de la soja, kevon secos y semillas. Evite o limite las pablo procesadas, laila el tocino, los hot dogs y las salchichas.   Grasas - El tipo de grasa que se consume es más importante que la cantidad de grasa consumida. Las grasas \"saturadas\" y \"trans\" pueden aumentar el riesgo de padecer problemas del corazón, laila por ejemplo un infarto.   Entre los alimentos que tienen grasas saturadas se encuentran la carne, la mantequilla, el queso y el helado   Entre los alimentos que tienen grasas trans se encuentran los alimentos procesados que incluyen \"aceites parcialmente hidrogenados\" en la lista de ingredientes, laila " "los alimentos fritos y las galletas, los panecillos, los pasteles y los bizcochos que se compran en las tiendas.  Las grasas \"monoinsaturadas\" y \"poliinsaturadas\" son más saludables. Entre los alimentos que las incluyen se cuentan el pescado, el aguacate, el aceite de horner y los kevon secos.   Calorías - Debe consumir maryuri cantidad determinada de calorías por día para mantener timmons peso. Si tiene sobrepeso y desea bajar de peso, debe consumir menos calorías por día.   Fibra - Consumir alimentos con mucha fibra puede ayudar a controlar el nivel de azúcar en jeronimo. Algunos alimentos que contienen mucha fibra son las manzanas, las habichuelas, las arvejas, los frijoles, las lentejas, los kevon secos, el cereal de gaudencio y los granos integrales.   [place] - Las personas que tienen presión arterial edinson no deben consumir alimentos que contengan mucha sal (también llamada sodio). También deben consumir alimentos saludables, laila frutas, verduras y lácteos bajos en grasa.   Alcohol - Beber más de maryuri copa (en el jordan de las personas de sexo femenino) o dos copas (en el jordan de las personas de sexo masculino) por día puede aumentar los niveles de azúcar en jeronimo. Además, las bebidas que tienen jugo de frutas o las gaseosas pueden aumentar los niveles de azúcar en jeronimo.  ¿Qué puedo hacer si necesito bajar de peso? -- Si necesita bajar de peso, puede hacer lo siguiente:   Hacer ejercicio - Intente hacer al menos 30 minutos de actividad física por día, la mayoría de los días de la semana. Incluso el ejercicio suave, laila caminar, es mcdaniels para la alirio. Algunas personas que padecen diabetes deben modificar timmons dosis de medicina antes de hacer ejercicio y también es posible que deban revisar timmons nivel de azúcar en jeronimo antes y después de hacer ejercicio.   Consumir menos calorías - Timmons médico, enfermero o nutricionista puede indicarle cuántas calorías debe consumir por día para bajar de peso.  Si está preocupado por timmons peso, " tamaño o silueta, consulte a timmons médico, enfermero o nutricionista. Puede ayudarlo a hacer cambios para mejorar timmons alirio.  ¿Puedo consumir los mismos alimentos que mi tommy? -- Sí. No hace falta que consuma alimentos especiales si tiene diabetes. Pueden ser los mismos que consume timmons tommy. Para modificar timmons dieta, debe consumir principalmente alimentos saludables y evitar comer demasiado.  ¿Cuáles son las otras partes del tratamiento para la diabetes? -- Además de modificar timmons alimentación, las otras partes del tratamiento para la diabetes son las siguientes:   Ejercicio   Medicinas  Algunas personas que tienen diabetes deben aprender a combinar timmons dieta y rutina de ejercicios con timmons dosis de medicina. Por ejemplo, es posible que aquellas personas que usan insulina deban regular la dosis que se van a aplicar. Para hacer esto, deben considerar lo siguiente:   Qué van a comer en la próxima comida   Cuánto ejercicio tienen pensado hacer   Cuál es timmons nivel de azúcar en jeronimo  Si la dieta y la rutina de ejercicios no coinciden con la dosis de medicina, el nivel de azúcar en jeronimo puede bajar o subir demasiado y esto puede producir problemas.  Todos los artículos se actualizan a medida que se descubre nueva evidencia y culmina nuestro proceso de evaluación por homólogos   Meme artículo se recuperó de UpToDate el: Mar 27, 2024.  Artículo 03294 Versión [id number].es-419.1  Release: 32.2.4 - C32.85  © 2024 UpToDate, Inc. Todos los derechos reservados.  Exención de responsabilidad y uso de la información del consumidor   Descargo de responsabilidad: esta información generalizada es un resumen limitado de información sobre el diagnóstico, el tratamiento y/o los medicamentos. No pretende ser exhaustiva y se debe utilizar laila herramienta para ayudar al usuario a comprender y/o evaluar las posibles opciones de diagnóstico y tratamiento. No incluye toda la información sobre afecciones, tratamientos, medicamentos, efectos  secundarios o riesgos puedan ser aplicables a un paciente específico. No tiene el propósito de servir laila recomendación médica ni de sustituir la recomendación médica, el diagnóstico o el tratamiento de un profesional de atención médica que se base en el examen y la evaluación de ryan profesional de la alirio respecto a las circunstancias específicas y únicas del paciente. Los pacientes deben hablar con un profesional de atención médica para obtener información completa sobre timmons alirio, cuestiones médicas y opciones de tratamiento, incluidos los riesgos o los beneficios relacionados con el uso de medicamentos. Esta información no certifica que los tratamientos o medicamentos asuncion seguros, eficaces o estén aprobados para tratar a un paciente específico. [employer] y steff afiliados renuncian a cualquier garantía o responsabilidad relacionada con esta información o el uso de la misma.El uso de esta información está sujeto a las Condiciones de uso, disponibles en [email or web address]/en/know/clinical-effectiveness-terms. 2024© [employer] y steff afiliados y/o licenciantes. Todos los derechos reservados.  Copyright   © 2024 [employer] Todos los derechos reservados.

## 2024-12-23 ENCOUNTER — APPOINTMENT (OUTPATIENT)
Dept: RADIOLOGY | Facility: OTHER | Age: 74
End: 2024-12-23
Payer: MEDICARE

## 2024-12-23 ENCOUNTER — OFFICE VISIT (OUTPATIENT)
Dept: OBGYN CLINIC | Facility: OTHER | Age: 74
End: 2024-12-23
Payer: MEDICARE

## 2024-12-23 VITALS — WEIGHT: 191 LBS | HEIGHT: 67 IN | BODY MASS INDEX: 29.98 KG/M2

## 2024-12-23 DIAGNOSIS — M19.012 PRIMARY OSTEOARTHRITIS OF BOTH SHOULDERS: Primary | ICD-10-CM

## 2024-12-23 DIAGNOSIS — M25.511 BILATERAL SHOULDER PAIN, UNSPECIFIED CHRONICITY: ICD-10-CM

## 2024-12-23 DIAGNOSIS — M19.011 PRIMARY OSTEOARTHRITIS OF BOTH SHOULDERS: Primary | ICD-10-CM

## 2024-12-23 DIAGNOSIS — M25.512 BILATERAL SHOULDER PAIN, UNSPECIFIED CHRONICITY: ICD-10-CM

## 2024-12-23 DIAGNOSIS — M67.912 DYSFUNCTION OF ROTATOR CUFF OF BOTH SHOULDERS: ICD-10-CM

## 2024-12-23 DIAGNOSIS — M67.911 DYSFUNCTION OF ROTATOR CUFF OF BOTH SHOULDERS: ICD-10-CM

## 2024-12-23 PROCEDURE — 99214 OFFICE O/P EST MOD 30 MIN: CPT | Performed by: ORTHOPAEDIC SURGERY

## 2024-12-23 PROCEDURE — 20610 DRAIN/INJ JOINT/BURSA W/O US: CPT | Performed by: ORTHOPAEDIC SURGERY

## 2024-12-23 PROCEDURE — 73030 X-RAY EXAM OF SHOULDER: CPT

## 2024-12-23 RX ORDER — BUPIVACAINE HYDROCHLORIDE 2.5 MG/ML
2 INJECTION, SOLUTION INFILTRATION; PERINEURAL
Status: COMPLETED | OUTPATIENT
Start: 2024-12-23 | End: 2024-12-23

## 2024-12-23 RX ORDER — BETAMETHASONE SODIUM PHOSPHATE AND BETAMETHASONE ACETATE 3; 3 MG/ML; MG/ML
6 INJECTION, SUSPENSION INTRA-ARTICULAR; INTRALESIONAL; INTRAMUSCULAR; SOFT TISSUE
Status: COMPLETED | OUTPATIENT
Start: 2024-12-23 | End: 2024-12-23

## 2024-12-23 RX ADMIN — BUPIVACAINE HYDROCHLORIDE 2 ML: 2.5 INJECTION, SOLUTION INFILTRATION; PERINEURAL at 08:00

## 2024-12-23 RX ADMIN — BETAMETHASONE SODIUM PHOSPHATE AND BETAMETHASONE ACETATE 6 MG: 3; 3 INJECTION, SUSPENSION INTRA-ARTICULAR; INTRALESIONAL; INTRAMUSCULAR; SOFT TISSUE at 08:00

## 2024-12-23 NOTE — PROGRESS NOTES
"  Assessment  Diagnoses and all orders for this visit:    Primary osteoarthritis of both shoulders    Bilateral rotator cuff dysfunction       Discussion and Plan:    The patient has an examination consistent with mild to moderate bilateral shoulder osteoarthritis.  I have discussed with the patient the pathophysiology of this diagnosis and reviewed how the examination correlates with this diagnosis.  Surgical vs conservative treatment options were discussed at length and after discussing these treatment options, the patient elected for a CS injection today.  Injection can be repeated in 4-6 mos if needed.  If this fails to provide relief we can consider further imaging in the form of MRI.       Subjective:   Patient ID: Jose Garcia is a 74 y.o. male      HPI  The patient presents with a chief complaint of bilateral shoulder pain.   The pain began several year(s) ago and is not associated with an acute injury.  Patient had the right shoulder injected while in [place] last year 2023 and completed a course of PT there. The patient describes the pain as aching and dull in intensity,  intermittent in timing, and localizes the pain to the  bilateral anterolateral shoulder and joint.  The pain is worse with movement and overuse and relieved by rest.  The pain is not associated with numbness and tingling.  The pain is not associated with constitutional symptoms. The patient is awoken at night by the pain.    The patient has had PT and right shoulder injections.          The following portions of the patient's history were reviewed and updated as appropriate: allergies, current medications, past family history, past medical history, past social history, past surgical history and problem list.      Objective:  Ht 5' 7\" (1.702 m)   Wt 86.6 kg (191 lb)   BMI 29.91 kg/m²       Right Shoulder Exam     Tenderness   The patient is experiencing no tenderness.    Range of Motion   External rotation:  50   Forward flexion:  160 "   Internal rotation 0 degrees:  Lumbar     Muscle Strength   Abduction: 4/5   External rotation: 4/5     Other   Erythema: absent  Sensation: normal  Pulse: present      Left Shoulder Exam     Tenderness   The patient is experiencing no tenderness.     Range of Motion   External rotation:  50   Forward flexion:  160   Internal rotation 0 degrees:  Lumbar     Muscle Strength   Abduction: 4/5   External rotation: 4/5     Other   Erythema: absent  Sensation: normal  Pulse: present             Physical Exam  Vitals reviewed.   Constitutional:       Appearance: He is well-developed.   HENT:      Head: Normocephalic.   Eyes:      Pupils: Pupils are equal, round, and reactive to light.   Pulmonary:      Effort: Pulmonary effort is normal.   Abdominal:      General: Abdomen is flat. There is no distension.   Skin:     General: Skin is warm and dry.       Large joint arthrocentesis: bilateral glenohumeral  Universal Protocol:  procedure performed by consultantConsent: Verbal consent obtained.  Consent given by: parent  Patient understanding: patient states understanding of the procedure being performed  Site marked: the operative site was marked  Patient identity confirmed: verbally with patient  Supporting Documentation  Indications: pain   Procedure Details  Location: shoulder - bilateral glenohumeral  Preparation: Patient was prepped and draped in the usual sterile fashion  Needle size: 22 G  Ultrasound guidance: no  Approach: posterior    Medications (Right): 2 mL bupivacaine 0.25 %; 6 mg betamethasone acetate-betamethasone sodium phosphate 6 (3-3) mg/mLMedications (Left): 2 mL bupivacaine 0.25 %; 6 mg betamethasone acetate-betamethasone sodium phosphate 6 (3-3) mg/mL   Patient tolerance: patient tolerated the procedure well with no immediate complications  Dressing:  Sterile dressing applied           I have personally reviewed pertinent films in PACS and my interpretation is as follows.    Bilateral shoulder x-rays  demonstrates mild-moderate bilateral degenerative changes    Records Reviewed: none    Scribe Attestation      I,:  Debi Blackwell MA am acting as a scribe while in the presence of the attending physician.:       I,:  Aly Dempsey MD personally performed the services described in this documentation    as scribed in my presence.:

## 2025-02-03 DIAGNOSIS — G47.09 OTHER INSOMNIA: ICD-10-CM

## 2025-02-03 DIAGNOSIS — K21.9 GASTROESOPHAGEAL REFLUX DISEASE WITHOUT ESOPHAGITIS: ICD-10-CM

## 2025-02-03 DIAGNOSIS — I10 ESSENTIAL HYPERTENSION: ICD-10-CM

## 2025-02-04 RX ORDER — LOSARTAN POTASSIUM 100 MG/1
100 TABLET ORAL DAILY
Qty: 90 TABLET | Refills: 0 | Status: SHIPPED | OUTPATIENT
Start: 2025-02-04

## 2025-02-04 RX ORDER — TRAZODONE HYDROCHLORIDE 150 MG/1
150 TABLET ORAL
Qty: 45 TABLET | Refills: 0 | Status: SHIPPED | OUTPATIENT
Start: 2025-02-04

## 2025-02-04 RX ORDER — FAMOTIDINE 20 MG/1
20 TABLET, FILM COATED ORAL 2 TIMES DAILY
Qty: 180 TABLET | Refills: 0 | Status: SHIPPED | OUTPATIENT
Start: 2025-02-04

## 2025-02-24 DIAGNOSIS — E78.00 PURE HYPERCHOLESTEROLEMIA: ICD-10-CM

## 2025-02-25 RX ORDER — SIMVASTATIN 20 MG
20 TABLET ORAL
Qty: 90 TABLET | Refills: 1 | Status: SHIPPED | OUTPATIENT
Start: 2025-02-25

## 2025-03-28 DIAGNOSIS — E03.9 ACQUIRED HYPOTHYROIDISM: ICD-10-CM

## 2025-03-28 RX ORDER — LEVOTHYROXINE SODIUM 150 UG/1
150 TABLET ORAL
Qty: 90 TABLET | Refills: 0 | Status: CANCELLED | OUTPATIENT
Start: 2025-03-28

## 2025-04-07 RX ORDER — LEVOTHYROXINE SODIUM 150 UG/1
150 TABLET ORAL
Qty: 90 TABLET | Refills: 3 | Status: SHIPPED | OUTPATIENT
Start: 2025-04-07

## 2025-04-07 NOTE — TELEPHONE ENCOUNTER
Patient called following up on this medication. He is currently out. Please send today. Thank you!     Medication: Levothyroxine     Dose/Frequency: 150 mcg     Quantity: 90    Pharmacy: [facility] - [place] - [facility]    Office:   [x] PCP/Provider -   [] Speciality/Provider -     Does the patient have enough for 3 days?   [] Yes   [x] No - Send as HP to POD

## 2025-05-10 DIAGNOSIS — G47.09 OTHER INSOMNIA: ICD-10-CM

## 2025-05-11 RX ORDER — TRAZODONE HYDROCHLORIDE 150 MG/1
150 TABLET ORAL
Qty: 30 TABLET | Refills: 5 | Status: SHIPPED | OUTPATIENT
Start: 2025-05-11

## 2025-05-19 ENCOUNTER — APPOINTMENT (EMERGENCY)
Dept: CT IMAGING | Facility: HOSPITAL | Age: 75
End: 2025-05-19
Payer: MEDICARE

## 2025-05-19 ENCOUNTER — TELEPHONE (OUTPATIENT)
Age: 75
End: 2025-05-19

## 2025-05-19 ENCOUNTER — HOSPITAL ENCOUNTER (EMERGENCY)
Facility: HOSPITAL | Age: 75
Discharge: HOME | End: 2025-05-19
Attending: EMERGENCY MEDICINE
Payer: MEDICARE

## 2025-05-19 VITALS
RESPIRATION RATE: 16 BRPM | TEMPERATURE: 98.4 F | OXYGEN SATURATION: 97 % | DIASTOLIC BLOOD PRESSURE: 70 MMHG | HEART RATE: 72 BPM | SYSTOLIC BLOOD PRESSURE: 142 MMHG

## 2025-05-19 DIAGNOSIS — M25.552 LEFT HIP PAIN: Primary | ICD-10-CM

## 2025-05-19 DIAGNOSIS — M25.452 HIP JOINT EFFUSION, LEFT: ICD-10-CM

## 2025-05-19 LAB
ALBUMIN SERPL BCG-MCNC: 4.2 G/DL (ref 3.5–5)
ALP SERPL-CCNC: 78 U/L (ref 34–104)
ALT SERPL W P-5'-P-CCNC: 8 U/L (ref 7–52)
ANION GAP SERPL CALCULATED.3IONS-SCNC: 8 MMOL/L (ref 4–13)
AST SERPL W P-5'-P-CCNC: 15 U/L (ref 13–39)
BASOPHILS # BLD AUTO: 0.05 THOUSANDS/ÂΜL (ref 0–0.1)
BASOPHILS NFR BLD AUTO: 1 % (ref 0–1)
BILIRUB SERPL-MCNC: 0.47 MG/DL (ref 0.2–1)
BUN SERPL-MCNC: 17 MG/DL (ref 5–25)
CALCIUM SERPL-MCNC: 9.2 MG/DL (ref 8.4–10.2)
CHLORIDE SERPL-SCNC: 104 MMOL/L (ref 96–108)
CO2 SERPL-SCNC: 25 MMOL/L (ref 21–32)
CREAT SERPL-MCNC: 0.85 MG/DL (ref 0.6–1.3)
CRP SERPL QL: 43 MG/L
EOSINOPHIL # BLD AUTO: 0.13 THOUSAND/ÂΜL (ref 0–0.61)
EOSINOPHIL NFR BLD AUTO: 1 % (ref 0–6)
ERYTHROCYTE [DISTWIDTH] IN BLOOD BY AUTOMATED COUNT: 13.3 % (ref 11.6–15.1)
ERYTHROCYTE [SEDIMENTATION RATE] IN BLOOD: 33 MM/HOUR (ref 0–19)
GFR SERPL CREATININE-BSD FRML MDRD: 84 ML/MIN/1.73SQ M
GLUCOSE SERPL-MCNC: 177 MG/DL (ref 65–140)
HCT VFR BLD AUTO: 36.4 % (ref 36.5–49.3)
HGB BLD-MCNC: 11.7 G/DL (ref 12–17)
IMM GRANULOCYTES # BLD AUTO: 0.06 THOUSAND/UL (ref 0–0.2)
IMM GRANULOCYTES NFR BLD AUTO: 1 % (ref 0–2)
LYMPHOCYTES # BLD AUTO: 1.52 THOUSANDS/ÂΜL (ref 0.6–4.47)
LYMPHOCYTES NFR BLD AUTO: 15 % (ref 14–44)
MCH RBC QN AUTO: 29.9 PG (ref 26.8–34.3)
MCHC RBC AUTO-ENTMCNC: 32.1 G/DL (ref 31.4–37.4)
MCV RBC AUTO: 93 FL (ref 82–98)
MONOCYTES # BLD AUTO: 0.84 THOUSAND/ÂΜL (ref 0.17–1.22)
MONOCYTES NFR BLD AUTO: 8 % (ref 4–12)
NEUTROPHILS # BLD AUTO: 7.69 THOUSANDS/ÂΜL (ref 1.85–7.62)
NEUTS SEG NFR BLD AUTO: 74 % (ref 43–75)
NRBC BLD AUTO-RTO: 0 /100 WBCS
PLATELET # BLD AUTO: 279 THOUSANDS/UL (ref 149–390)
PMV BLD AUTO: 8.9 FL (ref 8.9–12.7)
POTASSIUM SERPL-SCNC: 4 MMOL/L (ref 3.5–5.3)
PROT SERPL-MCNC: 7.2 G/DL (ref 6.4–8.4)
RBC # BLD AUTO: 3.91 MILLION/UL (ref 3.88–5.62)
SODIUM SERPL-SCNC: 137 MMOL/L (ref 135–147)
WBC # BLD AUTO: 10.29 THOUSAND/UL (ref 4.31–10.16)

## 2025-05-19 PROCEDURE — 80053 COMPREHEN METABOLIC PANEL: CPT | Performed by: EMERGENCY MEDICINE

## 2025-05-19 PROCEDURE — 85025 COMPLETE CBC W/AUTO DIFF WBC: CPT | Performed by: EMERGENCY MEDICINE

## 2025-05-19 PROCEDURE — 96374 THER/PROPH/DIAG INJ IV PUSH: CPT

## 2025-05-19 PROCEDURE — 36415 COLL VENOUS BLD VENIPUNCTURE: CPT | Performed by: EMERGENCY MEDICINE

## 2025-05-19 PROCEDURE — 72193 CT PELVIS W/DYE: CPT

## 2025-05-19 PROCEDURE — 99284 EMERGENCY DEPT VISIT MOD MDM: CPT

## 2025-05-19 PROCEDURE — 86140 C-REACTIVE PROTEIN: CPT | Performed by: EMERGENCY MEDICINE

## 2025-05-19 PROCEDURE — 99284 EMERGENCY DEPT VISIT MOD MDM: CPT | Performed by: EMERGENCY MEDICINE

## 2025-05-19 PROCEDURE — 85652 RBC SED RATE AUTOMATED: CPT | Performed by: EMERGENCY MEDICINE

## 2025-05-19 RX ORDER — KETOROLAC TROMETHAMINE 30 MG/ML
15 INJECTION, SOLUTION INTRAMUSCULAR; INTRAVENOUS ONCE
Status: COMPLETED | OUTPATIENT
Start: 2025-05-19 | End: 2025-05-19

## 2025-05-19 RX ADMIN — IOHEXOL 100 ML: 350 INJECTION, SOLUTION INTRAVENOUS at 10:55

## 2025-05-19 RX ADMIN — KETOROLAC TROMETHAMINE 15 MG: 30 INJECTION, SOLUTION INTRAMUSCULAR; INTRAVENOUS at 09:58

## 2025-05-19 NOTE — ED PROVIDER NOTES
Time reflects when diagnosis was documented in both MDM as applicable and the Disposition within this note       Time User Action Codes Description Comment    5/19/2025 12:14 PM Fly Jasso [M25.552] Left hip pain     5/19/2025 12:14 PM Fly Jasso [M25.452] Hip joint effusion, left           ED Disposition       ED Disposition   Discharge    Condition   Stable    Date/Time   Mon May 19, 2025 12:14 PM    Comment   Jose Garcia discharge to home/self care.                   Assessment & Plan       Medical Decision Making  75-year-old male presenting with atraumatic left hip pain.  Given prior history of severe arthritis, suspect similar etiology.  Consider fracture, hematoma.  Obtain labs, CT pelvis.  Pain control.    Upon reassessment, patient with significant improvement after Toradol administration.  Able to ambulate although does elicit some pain to left hip.  Discussed all results and advise close follow-up with orthopedic team.  ASAP ambulatory referral ordered.  Return precautions discussed.    Amount and/or Complexity of Data Reviewed  Labs: ordered.  Radiology: ordered.    Risk  Prescription drug management.             Medications   ketorolac (TORADOL) injection 15 mg (15 mg Intravenous Given 5/19/25 0958)   iohexol (OMNIPAQUE) 350 MG/ML injection (MULTI-DOSE) 100 mL (100 mL Intravenous Given 5/19/25 1055)       ED Risk Strat Scores                    No data recorded        SBIRT 22yo+      Flowsheet Row Most Recent Value   Initial Alcohol Screen: US AUDIT-C     1. How often do you have a drink containing alcohol? 0 Filed at: 05/19/2025 0952   2. How many drinks containing alcohol do you have on a typical day you are drinking?  0 Filed at: 05/19/2025 0952   3a. Male UNDER 65: How often do you have five or more drinks on one occasion? 0 Filed at: 05/19/2025 0952   3b. FEMALE Any Age, or MALE 65+: How often do you have 4 or more drinks on one occassion? 0 Filed at: 05/19/2025 0952   Audit-C  Score 0 Filed at: 05/19/2025 0952   B[place]: How many times in the past year have you...    Used an illegal drug or used a prescription medication for non-medical reasons? Never Filed at: 05/19/2025 0952                            History of Present Illness       Chief Complaint   Patient presents with    Groin Pain     Pt [facility] c/o L groin pain. Had similar pain a few months ago that improved but now back since Saturday. Pain making it difficult to wlk       Past Medical History:   Diagnosis Date    Allergic 2015    spring/fall    Arthritis 2005    Disease of thyroid gland     Hypertension     Kidney stone 2010    one time    Sleep disorder       Past Surgical History:   Procedure Laterality Date    CHOLECYSTECTOMY      ELBOW SURGERY Left     bursa removed    EYE SURGERY  cataract removal/lens implant    2018    JOINT REPLACEMENT      KNEE SURGERY      LEG WOUND REPAIR / CLOSURE      ID APPLICATION SHORT ARM SPLINT FOREARM-HAND STATIC Right 8/15/2024    Procedure: Application of short arm splint;  Surgeon: Jerzy Klein MD;  Location: [facility] MAIN OR;  Service: Orthopedics    ID CARPECTOMY ALL BONES PROXIMAL ROW Right 8/15/2024    Procedure: Right wrist proximal row carpectomy;  Surgeon: Jerzy Klein MD;  Location: [facility] MAIN OR;  Service: Orthopedics    ID TDN TRNSPLJ/TR FLXR/XTNSR F/ARM&/WRST 1 EA TDN Right 8/15/2024    Procedure: EPL tendon transposition;  Surgeon: Jerzy Klein MD;  Location:  MAIN OR;  Service: Orthopedics    ID TRANSECTION/AVULSION OTH SPINAL NRV XDRL Right 8/15/2024    Procedure: Posterior interosseous nerve neurectomy;  Surgeon: Jerzy Klein MD;  Location: [facility] MAIN OR;  Service: Orthopedics      Family History   Problem Relation Age of Onset    Breast cancer Mother     Cancer Mother     Heart disease Father     Gout Father     Autoimmune disease Sister     Breast cancer Sister     Alcohol abuse Brother     Depression Brother     Stroke Brother     Heart disease Brother      Heart disease Brother     Arthritis Brother     Heart disease Brother     Heart disease Brother     Thyroid disease Daughter     Asthma Daughter     Thyroid disease Daughter     Mental illness Neg Hx       Social History[1]   E-Cigarette/Vaping    E-Cigarette Use Never User       E-Cigarette/Vaping Substances    Nicotine No     THC No     CBD No     Flavoring No     Other No     Unknown No       I have reviewed and agree with the history as documented.     75-year-old male presents for evaluation of left hip pain that started a few days ago.  Patient states he had an episode a few weeks ago where he felt a twinge which resolved spontaneously and then for the last few days has been having more difficulty walking due to pain.  Patient has been very active going up and down the stairs because he has a wood shop in his basement.  Denies any known trauma.        Review of Systems   Constitutional:  Negative for fever.   Musculoskeletal:  Positive for arthralgias.           Objective       ED Triage Vitals   Temperature Pulse Blood Pressure Respirations SpO2 Patient Position - Orthostatic VS   05/19/25 0917 05/19/25 0917 05/19/25 0930 05/19/25 0917 05/19/25 0917 05/19/25 0930   98.4 °F (36.9 °C) 78 154/67 18 98 % Sitting      Temp src Heart Rate Source BP Location FiO2 (%) Pain Score    -- 05/19/25 0917 05/19/25 0930 -- 05/19/25 0917     Monitor Right arm  5      Vitals      Date and Time Temp Pulse SpO2 Resp BP Pain Score FACES Pain Rating User   05/19/25 1209 -- 72 97 % 16 142/70 -- -- [facility]   05/19/25 1124 -- 70 98 % -- -- -- -- [facility]   05/19/25 0958 -- -- -- -- -- 5 -- BF   05/19/25 0930 -- 83 98 % 18 154/67 -- -- CM   05/19/25 0917 98.4 °F (36.9 °C) 78 98 % 18 -- 5 -- ML            Physical Exam  Vitals and nursing note reviewed.   Constitutional:       General: He is not in acute distress.     Appearance: He is well-developed.   HENT:      Head: Normocephalic and atraumatic.      Right Ear: External ear normal.       Left Ear: External ear normal.      Nose: Nose normal.     Eyes:      General: No scleral icterus.    Pulmonary:      Effort: Pulmonary effort is normal. No respiratory distress.   Abdominal:      General: There is no distension.      Palpations: Abdomen is soft.     Musculoskeletal:         General: No deformity. Normal range of motion.      Cervical back: Normal range of motion and neck supple.      Comments: 3/5 strength of left hip.  Intact distal pulses     Skin:     General: Skin is warm.      Findings: No rash.      Comments: No overlying skin changes     Neurological:      General: No focal deficit present.      Mental Status: He is alert.      Gait: Gait normal.     Psychiatric:         Mood and Affect: Mood normal.         Results Reviewed       Procedure Component Value Units Date/Time    Comprehensive metabolic panel [[id number]]  (Abnormal) Collected: 05/19/25 0938    Lab Status: Final result Specimen: Blood from Arm, Left Updated: 05/19/25 1001     Sodium 137 mmol/L      Potassium 4.0 mmol/L      Chloride 104 mmol/L      CO2 25 mmol/L      ANION GAP 8 mmol/L      BUN 17 mg/dL      Creatinine 0.85 mg/dL      Glucose 177 mg/dL      Calcium 9.2 mg/dL      AST 15 U/L      ALT 8 U/L      Alkaline Phosphatase 78 U/L      Total Protein 7.2 g/dL      Albumin 4.2 g/dL      Total Bilirubin 0.47 mg/dL      eGFR 84 ml/min/1.73sq m     Narrative:      National Kidney Disease Foundation guidelines for Chronic Kidney Disease (CKD):     Stage 1 with normal or high GFR (GFR > 90 mL/min/1.73 square meters)    Stage 2 Mild CKD (GFR = 60-89 mL/min/1.73 square meters)    Stage 3A Moderate CKD (GFR = 45-59 mL/min/1.73 square meters)    Stage 3B Moderate CKD (GFR = 30-44 mL/min/1.73 square meters)    Stage 4 Severe CKD (GFR = 15-29 mL/min/1.73 square meters)    Stage 5 End Stage CKD (GFR <15 mL/min/1.73 square meters)  Note: GFR calculation is accurate only with a steady state creatinine    C-reactive protein [[id number]]   (Abnormal) Collected: 05/19/25 0938    Lab Status: Final result Specimen: Blood from Arm, Left Updated: 05/19/25 1001     CRP 43.0 mg/L     Sedimentation rate, automated [[id number]]  (Abnormal) Collected: 05/19/25 0938    Lab Status: Final result Specimen: Blood from Arm, Left Updated: 05/19/25 0953     Sed Rate 33 mm/hour     CBC and differential [[id number]]  (Abnormal) Collected: 05/19/25 0938    Lab Status: Final result Specimen: Blood from Arm, Left Updated: 05/19/25 0944     WBC 10.29 Thousand/uL      RBC 3.91 Million/uL      Hemoglobin 11.7 g/dL      Hematocrit 36.4 %      MCV 93 fL      MCH 29.9 pg      MCHC 32.1 g/dL      RDW 13.3 %      MPV 8.9 fL      Platelets 279 Thousands/uL      nRBC 0 /100 WBCs      Segmented % 74 %      Immature Grans % 1 %      Lymphocytes % 15 %      Monocytes % 8 %      Eosinophils Relative 1 %      Basophils Relative 1 %      Absolute Neutrophils 7.69 Thousands/µL      Absolute Immature Grans 0.06 Thousand/uL      Absolute Lymphocytes 1.52 Thousands/µL      Absolute Monocytes 0.84 Thousand/µL      Eosinophils Absolute 0.13 Thousand/µL      Basophils Absolute 0.05 Thousands/µL             CT pelvis w contrast   Final Interpretation by Shukri Sharp MD (05/19 1132)      1. Advanced degenerative changes in the left hip with prominent osteophyte formation and subchondral cysts.   2. Small left hip effusion   3. No fracture seen.      Workstation performed: [id number]             Procedures    ED Medication and Procedure Management   Prior to Admission Medications   Prescriptions Last Dose Informant Patient Reported? Taking?   Ascorbic Acid (vitamin C) 1000 MG tablet   Yes No   Sig: Take 1,000 mg by mouth daily   Cholecalciferol (Vitamin D-3) 25 MCG (1000 UT) CAPS   Yes No   Sig: Take by mouth 2 daily   ELDERBERRY PO   Yes No   Sig: Take 1 tablet by mouth daily   acetaminophen (TYLENOL) 650 mg CR tablet   No No   Sig: Take 1 tablet (650 mg total) by mouth every 8 (eight) hours  as needed for mild pain   famotidine (PEPCID) 20 mg tablet   No No   Sig: Take 1 tablet (20 mg total) by mouth 2 (two) times a day   fluorouracil (EFUDEX) 5 % cream   Yes No   Sig: APPLY CREAM TO THE SCALP TWICE DAILY FOR 2 WEEKS. KEEP OUT OF REACH OF CHILDREN/PETS. WILL CAUSE REDNESS AND CRUSTING.   ibuprofen (MOTRIN) 200 mg tablet  Self Yes No   Sig: Take 400 mg by mouth every 6 (six) hours as needed for mild pain   levothyroxine (Euthyrox) 150 mcg tablet   No No   Sig: Take 1 tablet (150 mcg total) by mouth daily in the early morning   losartan (COZAAR) 100 MG tablet   No No   Sig: Take 1 tablet (100 mg total) by mouth daily   simvastatin (ZOCOR) 20 mg tablet   No No   Sig: TAKE 1 TABLET BY MOUTH ONCE DAILY AT BEDTIME   traZODone (DESYREL) 150 mg tablet   No No   Sig: Take 1 tablet (150 mg total) by mouth daily at bedtime   vitamin B-12 (VITAMIN B-12) 500 mcg tablet   No No   Sig: Take 2 tablets (1,000 mcg total) by mouth daily      Facility-Administered Medications: None     Discharge Medication List as of 5/19/2025 12:15 PM        CONTINUE these medications which have NOT CHANGED    Details   acetaminophen (TYLENOL) 650 mg CR tablet Take 1 tablet (650 mg total) by mouth every 8 (eight) hours as needed for mild pain, Starting Thu 8/15/2024, Normal      Ascorbic Acid (vitamin C) 1000 MG tablet Take 1,000 mg by mouth daily, Historical Med      Cholecalciferol (Vitamin D-3) 25 MCG (1000 UT) CAPS Take by mouth 2 daily, Historical Med      ELDERBERRY PO Take 1 tablet by mouth daily, Historical Med      famotidine (PEPCID) 20 mg tablet Take 1 tablet (20 mg total) by mouth 2 (two) times a day, Starting Tue 2/4/2025, Normal      fluorouracil (EFUDEX) 5 % cream APPLY CREAM TO THE SCALP TWICE DAILY FOR 2 WEEKS. KEEP OUT OF REACH OF CHILDREN/PETS. WILL CAUSE REDNESS AND CRUSTING., Historical Med      ibuprofen (MOTRIN) 200 mg tablet Take 400 mg by mouth every 6 (six) hours as needed for mild pain, Historical Med       levothyroxine (Euthyrox) 150 mcg tablet Take 1 tablet (150 mcg total) by mouth daily in the early morning, Starting Mon 2025, Normal      losartan (COZAAR) 100 MG tablet Take 1 tablet (100 mg total) by mouth daily, Starting 2025, Normal      simvastatin (ZOCOR) 20 mg tablet TAKE 1 TABLET BY MOUTH ONCE DAILY AT BEDTIME, Starting 2025, Normal      traZODone (DESYREL) 150 mg tablet Take 1 tablet (150 mg total) by mouth daily at bedtime, Starting Sun 2025, Normal      vitamin B-12 (VITAMIN B-12) 500 mcg tablet Take 2 tablets (1,000 mcg total) by mouth daily, Starting 2022, Normal             ED SEPSIS DOCUMENTATION   Time reflects when diagnosis was documented in both MDM as applicable and the Disposition within this note       Time User Action Codes Description Comment    2025 12:14 PM Fly Jasso [M25.552] Left hip pain     2025 12:14 PM Fly Jasso [M25.452] Hip joint effusion, left                    [1]   Social History  Tobacco Use    Smoking status: Former     Current packs/day: 0.00     Types: Cigarettes, Pipe, Cigars     Quit date:      Years since quittin.4    Smokeless tobacco: Never    Tobacco comments:     quit in    Vaping Use    Vaping status: Never Used   Substance Use Topics    Alcohol use: Not Currently     Alcohol/week: 8.0 standard drinks of alcohol     Types: 7 Glasses of wine, 1 Shots of liquor per week    Drug use: No        Fly Jasso DO  25 [id number]

## 2025-05-19 NOTE — DISCHARGE INSTRUCTIONS
IMPRESSION:     1. Advanced degenerative changes in the left hip with prominent osteophyte formation and subchondral cysts.  2. Small left hip effusion  3. No fracture seen.

## 2025-05-19 NOTE — TELEPHONE ENCOUNTER
Hello,    Please advise if a forced appointment can be accommodated for the patient:    Call back #: [phone]     Insurance: Medicare A&B    Reason for appointment: Patient was seen in ED today 5/19 for left hip pain, I was able to schedule patient for soonest available apt on 6/27, but patient is requesting to be seen sooner; please advise.     Requested doctor and/or location: Dr Whitten / [facility]      Thank you.

## 2025-05-19 NOTE — TELEPHONE ENCOUNTER
Caller: Dr. Jasso - [facility]     Doctor: [facility]    Reason for call: calling to request that patient is scheduled with ANY hip provider ASAP as patient is having trouble walking - please call patient directly to force him on    Call back#: [phone]

## 2025-05-20 ENCOUNTER — HOSPITAL ENCOUNTER (OUTPATIENT)
Dept: RADIOLOGY | Facility: HOSPITAL | Age: 75
Discharge: HOME | End: 2025-05-20
Payer: MEDICARE

## 2025-05-20 DIAGNOSIS — M25.552 LEFT HIP PAIN: ICD-10-CM

## 2025-05-20 DIAGNOSIS — M25.552 LEFT HIP PAIN: Primary | ICD-10-CM

## 2025-05-20 PROCEDURE — 73502 X-RAY EXAM HIP UNI 2-3 VIEWS: CPT

## 2025-05-23 ENCOUNTER — OFFICE VISIT (OUTPATIENT)
Dept: OBGYN CLINIC | Facility: CLINIC | Age: 75
End: 2025-05-23

## 2025-05-23 ENCOUNTER — PREP FOR PROCEDURE (OUTPATIENT)
Dept: OBGYN CLINIC | Facility: CLINIC | Age: 75
End: 2025-05-23

## 2025-05-23 VITALS — HEIGHT: 67 IN | WEIGHT: 191 LBS | BODY MASS INDEX: 29.98 KG/M2

## 2025-05-23 DIAGNOSIS — M25.59 PAIN IN OTHER JOINT: ICD-10-CM

## 2025-05-23 DIAGNOSIS — M16.12 PRIMARY OSTEOARTHRITIS OF ONE HIP, LEFT: Primary | ICD-10-CM

## 2025-05-23 RX ORDER — ACETAMINOPHEN 325 MG/1
975 TABLET ORAL ONCE
OUTPATIENT
Start: 2025-05-23 | End: 2025-05-23

## 2025-05-23 RX ORDER — SODIUM CHLORIDE, SODIUM LACTATE, POTASSIUM CHLORIDE, CALCIUM CHLORIDE 600; 310; 30; 20 MG/100ML; MG/100ML; MG/100ML; MG/100ML
125 INJECTION, SOLUTION INTRAVENOUS CONTINUOUS
OUTPATIENT
Start: 2025-05-23

## 2025-05-23 RX ORDER — CHLORHEXIDINE GLUCONATE ORAL RINSE 1.2 MG/ML
15 SOLUTION DENTAL ONCE
OUTPATIENT
Start: 2025-05-23 | End: 2025-05-23

## 2025-05-23 RX ORDER — CHLORHEXIDINE GLUCONATE 40 MG/ML
SOLUTION TOPICAL DAILY PRN
OUTPATIENT
Start: 2025-05-23

## 2025-05-23 RX ORDER — MUPIROCIN 20 MG/G
OINTMENT TOPICAL
Qty: 15 G | Refills: 1 | Status: SHIPPED | OUTPATIENT
Start: 2025-05-23

## 2025-05-23 RX ORDER — ASCORBIC ACID 500 MG
500 TABLET ORAL 2 TIMES DAILY
Qty: 30 TABLET | Refills: 1 | Status: SHIPPED | OUTPATIENT
Start: 2025-05-23

## 2025-05-23 RX ORDER — MULTIVIT-MIN/IRON FUM/FOLIC AC 7.5 MG-4
1 TABLET ORAL DAILY
Qty: 30 TABLET | Refills: 1 | Status: SHIPPED | OUTPATIENT
Start: 2025-05-23

## 2025-05-23 RX ORDER — VITAMIN B COMPLEX
2000 TABLET ORAL DAILY
Qty: 60 TABLET | Refills: 1 | Status: SHIPPED | OUTPATIENT
Start: 2025-05-23

## 2025-05-23 RX ORDER — SODIUM CHLORIDE, SODIUM LACTATE, POTASSIUM CHLORIDE, CALCIUM CHLORIDE 600; 310; 30; 20 MG/100ML; MG/100ML; MG/100ML; MG/100ML
20 INJECTION, SOLUTION INTRAVENOUS CONTINUOUS
OUTPATIENT
Start: 2025-05-23

## 2025-05-23 RX ORDER — FOLIC ACID 1 MG/1
1 TABLET ORAL DAILY
Qty: 30 TABLET | Refills: 1 | Status: SHIPPED | OUTPATIENT
Start: 2025-05-23

## 2025-05-23 NOTE — ASSESSMENT & PLAN NOTE
Findings today are consistent with left hip osteoarthritis. Imaging and prognosis was reviewed with the patient today. Discussed treatment options including continued observation, low impact exercises, anti-inflammatories, physical therapy, intra-articular cortisone injection, versus surgical intervention. The patient has elected to proceed with left [place] through the anterior approach. Risks and benefits of surgery to include but not limited to bleeding, infection, damage to surrounding structures, hardware failure, instability, fracture, dislocation, leg length inequality, need for further surgery, continued pain, stiffness, blood clots, stroke, heart attack, and LFCN numbness was discussed with the patient. Informed consent was signed today in the office. The patient has met with our surgical schedulers and our preoperative joint replacement pathway has been initiated. All questions were answered. Patient will follow-up 2 weeks post operatively.  Patient is a nonsmoker and acceptable BMI of 29.91. 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 Follow up after surgery.

## 2025-05-23 NOTE — PROGRESS NOTES
Hip New Office Note    Assessment:     1. Primary osteoarthritis of one hip, left        Plan:  Assessment & Plan  Primary osteoarthritis of one hip, left  Findings today are consistent with left hip osteoarthritis. Imaging and prognosis was reviewed with the patient today. Discussed treatment options including continued observation, low impact exercises, anti-inflammatories, physical therapy, intra-articular cortisone injection, versus surgical intervention. The patient has elected to proceed with left [place] through the anterior approach. Risks and benefits of surgery to include but not limited to bleeding, infection, damage to surrounding structures, hardware failure, instability, fracture, dislocation, leg length inequality, need for further surgery, continued pain, stiffness, blood clots, stroke, heart attack, and LFCN numbness was discussed with the patient. Informed consent was signed today in the office. The patient has met with our surgical schedulers and our preoperative joint replacement pathway has been initiated. All questions were answered. Patient will follow-up 2 weeks post operatively.  Patient is a nonsmoker and acceptable BMI of 29.91. 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 Follow up after surgery.         Subjective:     Patient ID: Jose Garcia is a 75 y.o. male.  Chief Complaint:  HPI:  75 y.o. male presents to the office for evaluation of left hip pain ongoing for several years with worsening over the past 1.5 months. His pain became so severe it prevented him from weightbearing which he then went to the ED. He is experiencing left sided groin and buttock pain. He has difficulty getting into/out of cars and putting on his socks/shoes. His pain increases with activities. He has been taking ibuprofen to control his pain. He also notes that his left leg is shorter than his right and he wears a small shoe lift.     He also has history of  revision left total knee performed by Dr. Rodriguez roughly 8-9 years ago.    Allergy:  Allergies[1]  Medications:  all current active meds have been reviewed  Past Medical History:  Past Medical History[2]  Past Surgical History:  Past Surgical History[3]  Family History:  Family History[4]  Social History:  Social History     Substance and Sexual Activity   Alcohol Use Not Currently    Alcohol/week: 8.0 standard drinks of alcohol    Types: 7 Glasses of wine, 1 Shots of liquor per week     Social History     Substance and Sexual Activity   Drug Use No     Tobacco Use History[5]        ROS:  General: Per HPI  Skin: Negative, except if noted below  HEENT: Negative  Respiratory: Negative  Cardiovascular: Negative  Gastrointestinal: Negative  Urinary: Negative  Vascular: Negative  Musculoskeletal: Positive per HPI   Neurologic: Positive per HPI  Endocrine: Negative    Objective:  BP Readings from Last 1 Encounters:   05/19/25 142/70      Wt Readings from Last 1 Encounters:   05/23/25 86.6 kg (191 lb)        Respiratory:   non-labored respirations    Lymphatics:  no palpable lymph nodes    Gait and Station:   Antalgic    Neurologic:   Alert and oriented times 3  Patient with normal sensation except as noted below  Deep tendon reflexes 2+ except as noted in MSK exam    Bilateral Lower Extremity:  Left Hip     Inspection: skin intact    Range of Motion: limited with pain    - Trendelenburg sign    Motor: 5/5 Q/HS/TA/GS/P    Pulses: 2+ DP / 2+ PT    SILT DP/SP/S/S/TN    Right Hip     Inspection: skin intact    Range of Motion: full without pain    - Trendelenburg sign    Motor: 5/5 Q/HS/TA/GS/P    Pulses: 2+ DP / 2+ PT    SILT DP/SP/S/S/TN    Imaging:  My interpretation XR AP pelvis/ left hip:  severe joint space narrowing, subchondral sclerosis, subchondral cysts, osteophyte formation. Bone on bone changes. No fracture or dislocation.     CT left hip: complete cartilage, innumerous cysts within the femoral head    BMI:  "  Estimated body mass index is 29.91 kg/m² as calculated from the following:    Height as of this encounter: 5' 7\" (1.702 m).    Weight as of this encounter: 86.6 kg (191 lb).  BSA:   Estimated body surface area is 1.98 meters squared as calculated from the following:    Height as of this encounter: 5' 7\" (1.702 m).    Weight as of this encounter: 86.6 kg (191 lb).           Scribe Attestation      I,:  Dora Rob am acting as a scribe while in the presence of the attending physician.:       I,:  Aly Whitten DO personally performed the services described in this documentation    as scribed in my presence.:                  [1] No Known Allergies  [2]   Past Medical History:  Diagnosis Date    Allergic 2015    spring/fall    Arthritis 2005    Disease of thyroid gland     Hypertension     Kidney stone 2010    one time    Sleep disorder    [3]   Past Surgical History:  Procedure Laterality Date    CHOLECYSTECTOMY      ELBOW SURGERY Left     bursa removed    EYE SURGERY  cataract removal/lens implant    2018    JOINT REPLACEMENT      KNEE SURGERY      LEG WOUND REPAIR / CLOSURE      OH APPLICATION SHORT ARM SPLINT FOREARM-HAND STATIC Right 8/15/2024    Procedure: Application of short arm splint;  Surgeon: Jerzy Klein MD;  Location: [facility] MA[facility] OR;  Service: Orthopedics    OH CARPECTOMY ALL BONES PROXIMAL ROW Right 8/15/2024    Procedure: Right wrist proximal row carpectomy;  Surgeon: Jerzy Klein MD;  Location:  MAIN OR;  Service: Orthopedics    OH TDN TRNSPLJ/TR FLXR/XTNSR F/ARM&/WRST 1 EA TDN Right 8/15/2024    Procedure: EPL tendon transposition;  Surgeon: Jerzy Klein MD;  Location: [facility];  Service: Orthopedics    OH TRANSECTION/AVULSION OTH SPINAL NRV XDRL Right 8/15/2024    Procedure: Posterior interosseous nerve neurectomy;  Surgeon: Jerzy Klein MD;  Location: [facility];  Service: Orthopedics   [4]   Family History  Problem Relation Name Age of Onset    Breast cancer " Mother Stacy     Cancer Mother Stacy     Heart disease Father Salas     Gout Father Salas     Autoimmune disease Sister Michelle Grier     Breast cancer Sister Michelle Girer     Alcohol abuse Brother Michelle Grier     Depression Brother Michelle Grier     Stroke Brother Jameel     Heart disease Brother Jameel     Heart disease Brother Neftali     Arthritis Brother Neftali     Heart disease Brother Patrick     Heart disease Brother Munir     Thyroid disease Daughter Chelsy     Asthma Daughter Chelsy     Thyroid disease Daughter Bianca     Mental illness Neg Hx     [5]   Social History  Tobacco Use   Smoking Status Former    Current packs/day: 0.00    Types: Cigarettes, Pipe, Cigars    Quit date:     Years since quittin.4   Smokeless Tobacco Never   Tobacco Comments    quit in

## 2025-05-28 DIAGNOSIS — I10 ESSENTIAL HYPERTENSION: ICD-10-CM

## 2025-05-29 RX ORDER — LOSARTAN POTASSIUM 100 MG/1
100 TABLET ORAL DAILY
Qty: 90 TABLET | Refills: 0 | Status: SHIPPED | OUTPATIENT
Start: 2025-05-29

## 2025-06-11 ENCOUNTER — RA CDI HCC (OUTPATIENT)
Dept: OTHER | Facility: HOSPITAL | Age: 75
End: 2025-06-11

## 2025-06-18 ENCOUNTER — RESULTS FOLLOW-UP (OUTPATIENT)
Dept: FAMILY MEDICINE CLINIC | Facility: HOSPITAL | Age: 75
End: 2025-06-18

## 2025-06-18 ENCOUNTER — APPOINTMENT (OUTPATIENT)
Dept: LAB | Facility: HOSPITAL | Age: 75
End: 2025-06-18
Payer: MEDICARE

## 2025-06-18 DIAGNOSIS — E11.9 TYPE 2 DIABETES MELLITUS WITHOUT COMPLICATION, WITHOUT LONG-TERM CURRENT USE OF INSULIN (HCC): ICD-10-CM

## 2025-06-18 DIAGNOSIS — E03.9 ACQUIRED HYPOTHYROIDISM: ICD-10-CM

## 2025-06-18 DIAGNOSIS — I10 ESSENTIAL HYPERTENSION: ICD-10-CM

## 2025-06-18 LAB
ALBUMIN SERPL BCG-MCNC: 4.2 G/DL (ref 3.5–5)
ALP SERPL-CCNC: 89 U/L (ref 34–104)
ALT SERPL W P-5'-P-CCNC: 13 U/L (ref 7–52)
ANION GAP SERPL CALCULATED.3IONS-SCNC: 11 MMOL/L (ref 4–13)
AST SERPL W P-5'-P-CCNC: 16 U/L (ref 13–39)
BILIRUB SERPL-MCNC: 0.51 MG/DL (ref 0.2–1)
BUN SERPL-MCNC: 22 MG/DL (ref 5–25)
CALCIUM SERPL-MCNC: 9.4 MG/DL (ref 8.4–10.2)
CHLORIDE SERPL-SCNC: 106 MMOL/L (ref 96–108)
CHOLEST SERPL-MCNC: 123 MG/DL (ref ?–200)
CO2 SERPL-SCNC: 24 MMOL/L (ref 21–32)
CREAT SERPL-MCNC: 1.03 MG/DL (ref 0.6–1.3)
CREAT UR-MCNC: 65.9 MG/DL
ERYTHROCYTE [DISTWIDTH] IN BLOOD BY AUTOMATED COUNT: 13.6 % (ref 11.6–15.1)
EST. AVERAGE GLUCOSE BLD GHB EST-MCNC: 131 MG/DL
GFR SERPL CREATININE-BSD FRML MDRD: 69 ML/MIN/1.73SQ M
GLUCOSE P FAST SERPL-MCNC: 108 MG/DL (ref 65–99)
HBA1C MFR BLD: 6.2 %
HCT VFR BLD AUTO: 36.1 % (ref 36.5–49.3)
HDLC SERPL-MCNC: 46 MG/DL
HGB BLD-MCNC: 11.5 G/DL (ref 12–17)
LDLC SERPL CALC-MCNC: 55 MG/DL (ref 0–100)
MCH RBC QN AUTO: 29.9 PG (ref 26.8–34.3)
MCHC RBC AUTO-ENTMCNC: 31.9 G/DL (ref 31.4–37.4)
MCV RBC AUTO: 94 FL (ref 82–98)
MICROALBUMIN UR-MCNC: <7 MG/L
PLATELET # BLD AUTO: 266 THOUSANDS/UL (ref 149–390)
PMV BLD AUTO: 9.6 FL (ref 8.9–12.7)
POTASSIUM SERPL-SCNC: 4.3 MMOL/L (ref 3.5–5.3)
PROT SERPL-MCNC: 6.8 G/DL (ref 6.4–8.4)
RBC # BLD AUTO: 3.85 MILLION/UL (ref 3.88–5.62)
SODIUM SERPL-SCNC: 141 MMOL/L (ref 135–147)
T4 FREE SERPL-MCNC: 1.49 NG/DL (ref 0.61–1.12)
TRIGL SERPL-MCNC: 111 MG/DL (ref ?–150)
TSH SERPL DL<=0.05 MIU/L-ACNC: 0.27 UIU/ML (ref 0.45–4.5)
WBC # BLD AUTO: 6.66 THOUSAND/UL (ref 4.31–10.16)

## 2025-06-18 PROCEDURE — 36415 COLL VENOUS BLD VENIPUNCTURE: CPT

## 2025-06-18 PROCEDURE — 85027 COMPLETE CBC AUTOMATED: CPT

## 2025-06-18 PROCEDURE — 84439 ASSAY OF FREE THYROXINE: CPT

## 2025-06-18 PROCEDURE — 80053 COMPREHEN METABOLIC PANEL: CPT

## 2025-06-18 PROCEDURE — 80061 LIPID PANEL: CPT

## 2025-06-18 PROCEDURE — 83036 HEMOGLOBIN GLYCOSYLATED A1C: CPT

## 2025-06-18 PROCEDURE — 82043 UR ALBUMIN QUANTITATIVE: CPT

## 2025-06-18 PROCEDURE — 82570 ASSAY OF URINE CREATININE: CPT

## 2025-06-18 PROCEDURE — 84443 ASSAY THYROID STIM HORMONE: CPT

## 2025-06-20 ENCOUNTER — RESULTS FOLLOW-UP (OUTPATIENT)
Dept: FAMILY MEDICINE CLINIC | Facility: HOSPITAL | Age: 75
End: 2025-06-20

## 2025-06-20 ENCOUNTER — APPOINTMENT (OUTPATIENT)
Dept: LAB | Facility: HOSPITAL | Age: 75
End: 2025-06-20
Payer: MEDICARE

## 2025-06-20 ENCOUNTER — OFFICE VISIT (OUTPATIENT)
Dept: FAMILY MEDICINE CLINIC | Facility: HOSPITAL | Age: 75
End: 2025-06-20
Payer: MEDICARE

## 2025-06-20 VITALS
HEART RATE: 65 BPM | WEIGHT: 183 LBS | RESPIRATION RATE: 16 BRPM | BODY MASS INDEX: 27.74 KG/M2 | SYSTOLIC BLOOD PRESSURE: 130 MMHG | TEMPERATURE: 97.5 F | OXYGEN SATURATION: 95 % | HEIGHT: 68 IN | DIASTOLIC BLOOD PRESSURE: 66 MMHG

## 2025-06-20 DIAGNOSIS — N39.41 URGENCY INCONTINENCE: ICD-10-CM

## 2025-06-20 DIAGNOSIS — K21.9 GASTROESOPHAGEAL REFLUX DISEASE WITHOUT ESOPHAGITIS: ICD-10-CM

## 2025-06-20 DIAGNOSIS — E03.9 ACQUIRED HYPOTHYROIDISM: ICD-10-CM

## 2025-06-20 DIAGNOSIS — R73.01 IMPAIRED FASTING GLUCOSE: Primary | ICD-10-CM

## 2025-06-20 DIAGNOSIS — E78.00 PURE HYPERCHOLESTEROLEMIA: ICD-10-CM

## 2025-06-20 DIAGNOSIS — Z00.00 MEDICARE ANNUAL WELLNESS VISIT, SUBSEQUENT: ICD-10-CM

## 2025-06-20 DIAGNOSIS — I10 ESSENTIAL HYPERTENSION: ICD-10-CM

## 2025-06-20 PROBLEM — E11.9 TYPE 2 DIABETES MELLITUS WITHOUT COMPLICATION, WITHOUT LONG-TERM CURRENT USE OF INSULIN (HCC): Status: RESOLVED | Noted: 2024-12-20 | Resolved: 2025-06-20

## 2025-06-20 LAB
BILIRUB UR QL STRIP: NEGATIVE
CLARITY UR: CLEAR
COLOR UR: COLORLESS
GLUCOSE UR STRIP-MCNC: NEGATIVE MG/DL
HGB UR QL STRIP.AUTO: NEGATIVE
KETONES UR STRIP-MCNC: NEGATIVE MG/DL
LEUKOCYTE ESTERASE UR QL STRIP: NEGATIVE
NITRITE UR QL STRIP: NEGATIVE
PH UR STRIP.AUTO: 6.5 [PH]
PROT UR STRIP-MCNC: NEGATIVE MG/DL
SP GR UR STRIP.AUTO: 1.01 (ref 1–1.03)
UROBILINOGEN UR STRIP-ACNC: <2 MG/DL

## 2025-06-20 PROCEDURE — 81003 URINALYSIS AUTO W/O SCOPE: CPT

## 2025-06-20 PROCEDURE — 99214 OFFICE O/P EST MOD 30 MIN: CPT | Performed by: INTERNAL MEDICINE

## 2025-06-20 PROCEDURE — G2211 COMPLEX E/M VISIT ADD ON: HCPCS | Performed by: INTERNAL MEDICINE

## 2025-06-20 PROCEDURE — G0439 PPPS, SUBSEQ VISIT: HCPCS | Performed by: INTERNAL MEDICINE

## 2025-06-20 RX ORDER — TRIAMCINOLONE ACETONIDE 1 MG/G
OINTMENT TOPICAL
COMMUNITY
Start: 2025-05-21

## 2025-06-20 RX ORDER — FAMOTIDINE 20 MG/1
20 TABLET, FILM COATED ORAL DAILY
Status: SHIPPED
Start: 2025-06-20

## 2025-06-20 RX ORDER — ASCORBIC ACID 125 MG
TABLET,CHEWABLE ORAL
COMMUNITY

## 2025-06-20 RX ORDER — LEVOTHYROXINE SODIUM 125 UG/1
125 TABLET ORAL
Qty: 100 TABLET | Refills: 3 | Status: SHIPPED | OUTPATIENT
Start: 2025-06-20

## 2025-06-20 NOTE — PROGRESS NOTES
Name: Jose Garcia      : 1950      MRN: [id number]  Encounter Provider: Michel Jarrell MD  Encounter Date: 2025   Encounter department: [facility] [address]  :  Assessment & Plan  Impaired fasting glucose  Patient watches his sweets, sugars, carb intake and tries to be as physically active as he can despite his hip pain for which she will have surgery in September.  His A1c decreased to 6.2 and his fasting blood sugar was 108 on .  He now has impaired fasting glucose  He had no microalbuminuria  I asked him to see ophthalmology to see if he has any signs of diabetic retinopathy  I will check his fasting blood sugar and A1c in 6 months before same  Orders:  •  Hemoglobin A1C; Future    Acquired hypothyroidism  He has hypothyroidism.  TSH was low on .  I decreased the dose of levothyroxine to 125 mcg and we will recheck his TSH in about 6 weeks  Orders:  •  levothyroxine 125 mcg tablet; Take 1 tablet (125 mcg total) by mouth daily in the early morning  •  TSH, 3rd generation with Free T4 reflex; Future    Pure hypercholesterolemia  He has hyperlipidemia.  He watches his diet, consumes a nutritious diet.  His LDL is well-controlled at 46.  His LFTs were normal on . continue diet and simvastatin       Essential hypertension  He has hypertension.  His blood pressure is controlled today.  Denies chest pains, shortness of breath.  Lungs are clear to auscultation and heart is regular rhythm without murmurs or gallops.  His electrolytes were normal on  and renal function was stable.  Continue losartan  Orders:  •  Comprehensive metabolic panel; Future  •  TSH, 3rd generation with Free T4 reflex; Future    Urgency incontinence  He complains of urinary urgency with incontinence occurring a couple of times a week.  This has been going on for a while.  Denies lower abdominal pain, slow or weak urinary stream, incomplete bladder emptying, dysuria, hematuria.  His  abdomen was soft and nontender without lower abdominal masses on exam    We discussed avoiding bladder irritants including alcohol, caffeine, spicy meals.  I will check patient's urinalysis to make sure he has no hematuria or UTI.  I advised him to set his alarm for every hour and 1/2-2 hours as a reminder to urinate to practice timed voiding.  I will check his bladder before and after voiding to see if he has urinary retention most likely due to BPH  Orders:  •  UA w Reflex to Microscopic w Reflex to Culture; Future  •  US bladder with post void residual; Future    Gastroesophageal reflux disease without esophagitis  Symptoms are controlled with Pepcid 20 mg daily.  CBC showed stable hemoglobin.  He had no abdominal tenderness today.  Continue Pepcid  Orders:  •  famotidine (PEPCID) 20 mg tablet; Take 1 tablet (20 mg total) by mouth daily    Medicare annual wellness visit, subsequent           Depression Screening and Follow-up Plan: Patient was screened for depression during today's encounter. They screened negative with a PHQ-2 score of 0.        Preventive health issues were discussed with patient, and age appropriate screening tests were ordered as noted in patient's After Visit Summary. Personalized health advice and appropriate referrals for health education or preventive services given if needed, as noted in patient's After Visit Summary.    History of Present Illness     HPI   Patient Care Team:  Michel Jarrell MD as PCP - General (Internal Medicine)    Review of Systems   Constitutional:  Negative for fever and unexpected weight change.   HENT:  Negative for hearing loss.    Eyes:  Negative for visual disturbance.   Respiratory:  Negative for shortness of breath.    Cardiovascular:  Negative for chest pain.   Gastrointestinal:  Negative for abdominal pain, blood in stool and constipation.   Genitourinary:  Positive for urgency. Negative for dysuria and hematuria.   Musculoskeletal:  Positive for  arthralgias.   Psychiatric/Behavioral:  Negative for dysphoric mood.      Medical History Reviewed by provider this encounter:  Tobacco  Allergies  Meds  Problems  Med Hx  Surg Hx  Fam Hx       Annual Wellness Visit Questionnaire   Jose is here for his Subsequent Wellness visit.     Health Risk Assessment:   Patient rates overall health as very good. Patient feels that their physical health rating is same. Patient is very satisfied with their life. Eyesight was rated as same. Hearing was rated as same. Patient feels that their emotional and mental health rating is same. Patients states they are never, rarely angry. Patient states they are sometimes unusually tired/fatigued. Pain experienced in the last 7 days has been a lot. Patient's pain rating has been 7/10. Patient states that he has experienced no weight loss or gain in last 6 months.     Depression Screening:   PHQ-2 Score: 0      Fall Risk Screening:   In the past year, patient has experienced: no history of falling in past year      Home Safety:  Patient does not have trouble with stairs inside or outside of their home. Patient has working smoke alarms and has working carbon monoxide detector. Home safety hazards include: none.     Nutrition:   Current diet is Regular, Low Carb and Limited junk food.     Medications:   Patient is not currently taking any over-the-counter supplements. Patient is able to manage medications.     Activities of Daily Living (ADLs)/Instrumental Activities of Daily Living (IADLs):   Walk and transfer into and out of bed and chair?: Yes  Dress and groom yourself?: Yes    Bathe or shower yourself?: Yes    Feed yourself? Yes  Do your laundry/housekeeping?: Yes  Manage your money, pay your bills and track your expenses?: Yes  Make your own meals?: Yes    Do your own shopping?: Yes    Previous Hospitalizations:   Any hospitalizations or ED visits within the last 12 months?: Yes    How many hospitalizations have you had in  the last year?: 1-2    Advance Care Planning:   Living will: Yes    Durable POA for healthcare: Yes    Advanced directive: Yes    Advanced directive counseling given: Yes    End of Life Decisions reviewed with patient: Yes      Preventive Screenings      Cardiovascular Screening:    General: Screening Not Indicated, History Lipid Disorder, Risks and Benefits Discussed and Screening Current      Diabetes Screening:     General: Screening Not Indicated, History Diabetes, Risks and Benefits Discussed and Screening Current      Colorectal Cancer Screening:     General: Screening Current      Prostate Cancer Screening:    General: Screening Not Indicated      Abdominal Aortic Aneurysm (AAA) Screening:    Risk factors include: tobacco use        Lung Cancer Screening:     General: Screening Not Indicated      Hepatitis C Screening:    General: Screening Current    Immunizations:    - Risks/benefits immunizations discussed      Screening, Brief Intervention, and Referral to Treatment (SBIRT)     Screening  Typical number of drinks in a day: 1  Typical number of drinks in a week: 7  Interpretation: Low risk drinking behavior.    AUDIT-C Screenin) How often did you have a drink containing alcohol in the past year? 4 or more times a week  2) How many drinks did you have on a typical day when you were drinking in the past year? 1 to 2  3) How often did you have 6 or more drinks on one occasion in the past year? never    AUDIT-C Score: 4  Interpretation: Score 4-12 (male): POSITIVE screen for alcohol misuse    AUDIT Screenin) How often during the last year have you found that you were not able to stop drinking once you had started? 0 - never  5) How often during the last year have you failed to do what was normally expected from you because of drinking? 0 - never  6) How often during the last year have you needed a first drink in the morning to get yourself going after a heavy drinking session? 0 - never  7) How often  during the last year have you had a feeling of guilt or remorse after drinking? 0 - never  8) How often during the last year have you been unable to remember what happened the night before because you had been drinking? 0 - never  9) Have you or someone else been injured as a result of your drinking? 0 - no  10) Has a relative or friend or a doctor or another health worker been concerned about your drinking or suggested you cut down? 0 - no    AUDIT Score: 4  Interpretation: Low risk alcohol consumption    Single Item Drug Screening:  How often have you used an illegal drug (including marijuana) or a prescription medication for non-medical reasons in the past year? never    Single Item Drug Screen Score: 0  Interpretation: Negative screen for possible drug use disorder    Brief Intervention  Alcohol & drug use screenings were reviewed. No concerns regarding substance use disorder identified.     Other Counseling Topics:   Regular weightbearing exercise.     Social Drivers of Health     Financial Resource Strain: Low Risk  (6/5/2023)    Overall Financial Resource Strain (CARDIA)    • Difficulty of Paying Living Expenses: Not hard at all   Food Insecurity: No Food Insecurity (6/19/2025)    Nursing - Inadequate Food Risk Classification    • Worried About Running Out of Food in the Last Year: Never true    • Ran Out of Food in the Last Year: Never true   Transportation Needs: No Transportation Needs (6/19/2025)    PRAPARE - Transportation    • Lack of Transportation (Medical): No    • Lack of Transportation (Non-Medical): No   Housing Stability: Low Risk  (6/19/2025)    Housing Stability Vital Sign    • Unable to Pay for Housing in the Last Year: No    • Number of Times Moved in the Last Year: 0    • Homeless in the Last Year: No   Utilities: Not At Risk (6/19/2025)    [facility] Utilities    • Threatened with loss of utilities: No     No results found.    Objective   /66   Pulse 65   Temp 97.5 °F (36.4 °C) (Tympanic)   " Resp 16   Ht 5' 7.5\" (1.715 m) Comment: actual  Wt 83 kg (183 lb)   SpO2 95%   BMI 28.24 kg/m²     Physical Exam  Constitutional:       General: He is not in acute distress.     Appearance: He is well-developed. He is not toxic-appearing.   HENT:      Head: Normocephalic.      Right Ear: Tympanic membrane normal.      Left Ear: Tympanic membrane normal.      Mouth/Throat:      Mouth: Mucous membranes are moist.      Pharynx: No oropharyngeal exudate.     Eyes:      Conjunctiva/sclera: Conjunctivae normal.       Cardiovascular:      Rate and Rhythm: Normal rate and regular rhythm.      Heart sounds: No murmur heard.  Pulmonary:      Effort: No respiratory distress.      Breath sounds: No wheezing or rales.   Abdominal:      General: Bowel sounds are normal.      Palpations: Abdomen is soft.      Tenderness: There is no abdominal tenderness.     Musculoskeletal:      Cervical back: Neck supple.     Skin:     General: Skin is warm and dry.     Neurological:      Mental Status: He is alert and oriented to person, place, and time.      Cranial Nerves: No cranial nerve deficit.      Motor: No weakness.     Psychiatric:         Mood and Affect: Mood normal.         "

## 2025-06-20 NOTE — ASSESSMENT & PLAN NOTE
He has hypertension.  His blood pressure is controlled today.  Denies chest pains, shortness of breath.  Lungs are clear to auscultation and heart is regular rhythm without murmurs or gallops.  His electrolytes were normal on June 18 and renal function was stable.  Continue losartan  Orders:  •  Comprehensive metabolic panel; Future  •  TSH, 3rd generation with Free T4 reflex; Future

## 2025-06-20 NOTE — ASSESSMENT & PLAN NOTE
He has hypothyroidism.  TSH was low on June 18.  I decreased the dose of levothyroxine to 125 mcg and we will recheck his TSH in about 6 weeks  Orders:  •  levothyroxine 125 mcg tablet; Take 1 tablet (125 mcg total) by mouth daily in the early morning  •  TSH, 3rd generation with Free T4 reflex; Future

## 2025-06-20 NOTE — ASSESSMENT & PLAN NOTE
He has hyperlipidemia.  He watches his diet, consumes a nutritious diet.  His LDL is well-controlled at 46.  His LFTs were normal on June 18. continue diet and simvastatin

## 2025-06-20 NOTE — ASSESSMENT & PLAN NOTE
Symptoms are controlled with Pepcid 20 mg daily.  CBC showed stable hemoglobin.  He had no abdominal tenderness today.  Continue Pepcid  Orders:  •  famotidine (PEPCID) 20 mg tablet; Take 1 tablet (20 mg total) by mouth daily

## 2025-06-20 NOTE — ASSESSMENT & PLAN NOTE
Patient watches his sweets, sugars, carb intake and tries to be as physically active as he can despite his hip pain for which she will have surgery in September.  His A1c decreased to 6.2 and his fasting blood sugar was 108 on June 18.  He now has impaired fasting glucose  He had no microalbuminuria  I asked him to see ophthalmology to see if he has any signs of diabetic retinopathy  I will check his fasting blood sugar and A1c in 6 months before same  Orders:  •  Hemoglobin A1C; Future

## 2025-06-26 ENCOUNTER — HOSPITAL ENCOUNTER (OUTPATIENT)
Dept: ULTRASOUND IMAGING | Facility: HOSPITAL | Age: 75
End: 2025-06-26
Attending: INTERNAL MEDICINE
Payer: MEDICARE

## 2025-06-26 DIAGNOSIS — N39.41 URGENCY INCONTINENCE: ICD-10-CM

## 2025-06-26 PROCEDURE — 51798 US URINE CAPACITY MEASURE: CPT

## 2025-07-15 ENCOUNTER — TELEPHONE (OUTPATIENT)
Age: 75
End: 2025-07-15

## 2025-08-11 ENCOUNTER — OFFICE VISIT (OUTPATIENT)
Dept: LAB | Facility: HOSPITAL | Age: 75
End: 2025-08-11
Payer: MEDICARE

## 2025-08-12 ENCOUNTER — EVALUATION (OUTPATIENT)
Dept: PHYSICAL THERAPY | Facility: CLINIC | Age: 75
End: 2025-08-12
Attending: PHYSICIAN ASSISTANT
Payer: MEDICARE

## 2025-08-20 ENCOUNTER — OFFICE VISIT (OUTPATIENT)
Age: 75
End: 2025-08-20
Payer: MEDICARE

## 2025-08-20 VITALS
BODY MASS INDEX: 28.37 KG/M2 | DIASTOLIC BLOOD PRESSURE: 72 MMHG | HEART RATE: 59 BPM | WEIGHT: 187.2 LBS | SYSTOLIC BLOOD PRESSURE: 134 MMHG | HEIGHT: 68 IN | OXYGEN SATURATION: 99 %

## 2025-08-20 DIAGNOSIS — E78.00 PURE HYPERCHOLESTEROLEMIA: ICD-10-CM

## 2025-08-20 DIAGNOSIS — R73.01 IMPAIRED FASTING GLUCOSE: ICD-10-CM

## 2025-08-20 DIAGNOSIS — I10 ESSENTIAL HYPERTENSION: ICD-10-CM

## 2025-08-20 DIAGNOSIS — Z01.818 PRE-OP EXAMINATION: ICD-10-CM

## 2025-08-20 DIAGNOSIS — E03.9 ACQUIRED HYPOTHYROIDISM: ICD-10-CM

## 2025-08-20 DIAGNOSIS — M16.12 PRIMARY OSTEOARTHRITIS OF ONE HIP, LEFT: Primary | ICD-10-CM

## 2025-08-20 DIAGNOSIS — Z86.718 HISTORY OF DVT OF LOWER EXTREMITY: ICD-10-CM

## 2025-08-20 PROCEDURE — G2211 COMPLEX E/M VISIT ADD ON: HCPCS | Performed by: INTERNAL MEDICINE

## 2025-08-20 PROCEDURE — 99215 OFFICE O/P EST HI 40 MIN: CPT | Performed by: INTERNAL MEDICINE

## 2025-08-22 ENCOUNTER — TELEPHONE (OUTPATIENT)
Age: 75
End: 2025-08-22

## 2025-08-26 LAB
DME PARACHUTE DELIVERY DATE ACTUAL: NORMAL
DME PARACHUTE DELIVERY DATE EXPECTED: NORMAL
DME PARACHUTE DELIVERY DATE REQUESTED: NORMAL
DME PARACHUTE ITEM DESCRIPTION: NORMAL
DME PARACHUTE ORDER STATUS: NORMAL
DME PARACHUTE SUPPLIER NAME: NORMAL
DME PARACHUTE SUPPLIER PHONE: NORMAL

## (undated) DEVICE — CUFF TOURNIQUET 18 X 4 IN QUICK CONNECT DISP 1 BLADDER

## (undated) DEVICE — STERILE BETHLEHEM PLASTIC HAND: Brand: CARDINAL HEALTH

## (undated) DEVICE — GLOVE SRG BIOGEL 7.5

## (undated) DEVICE — BLADE MINI RND TIP ONE SIDE SHARP

## (undated) DEVICE — GLOVE INDICATOR PI UNDERGLOVE SZ 8 BLUE

## (undated) DEVICE — ANTIBACTERIAL UNDYED BRAIDED (POLYGLACTIN 910), SYNTHETIC ABSORBABLE SUTURE: Brand: COATED VICRYL

## (undated) DEVICE — SPECIMEN CONTAINER STERILE PEEL PACK

## (undated) DEVICE — SUT ETHIBOND 2-0 V-7 30 IN B964H

## (undated) DEVICE — SKN PRP WNG SPNGE PVP SCRB STR: Brand: MEDLINE INDUSTRIES, INC.

## (undated) DEVICE — SUT PROLENE 4-0 PC-3 18 IN 8634G